# Patient Record
Sex: FEMALE | Race: WHITE | NOT HISPANIC OR LATINO
[De-identification: names, ages, dates, MRNs, and addresses within clinical notes are randomized per-mention and may not be internally consistent; named-entity substitution may affect disease eponyms.]

---

## 2017-01-27 ENCOUNTER — APPOINTMENT (OUTPATIENT)
Dept: OBGYN | Facility: CLINIC | Age: 27
End: 2017-01-27

## 2017-01-27 VITALS
HEIGHT: 62 IN | DIASTOLIC BLOOD PRESSURE: 78 MMHG | BODY MASS INDEX: 23 KG/M2 | WEIGHT: 125 LBS | SYSTOLIC BLOOD PRESSURE: 121 MMHG

## 2017-01-29 ENCOUNTER — RESULT REVIEW (OUTPATIENT)
Age: 27
End: 2017-01-29

## 2017-01-29 LAB
C TRACH DNA SPEC QL NAA+PROBE: NORMAL
C TRACH RRNA SPEC QL NAA+PROBE: NORMAL
HIV1+2 AB SPEC QL IA.RAPID: NONREACTIVE
N GONORRHOEA DNA SPEC QL NAA+PROBE: NORMAL
N GONORRHOEA RRNA SPEC QL NAA+PROBE: NORMAL
SOURCE AMPLIFICATION: NORMAL
T PALLIDUM AB SER QL IA: NEGATIVE
TSH SERPL-ACNC: 1.03 UIU/ML

## 2017-01-30 ENCOUNTER — OUTPATIENT (OUTPATIENT)
Dept: OUTPATIENT SERVICES | Facility: HOSPITAL | Age: 27
LOS: 1 days | End: 2017-01-30
Payer: COMMERCIAL

## 2017-01-30 ENCOUNTER — RESULT REVIEW (OUTPATIENT)
Age: 27
End: 2017-01-30

## 2017-01-30 DIAGNOSIS — Z30.430 ENCOUNTER FOR INSERTION OF INTRAUTERINE CONTRACEPTIVE DEVICE: ICD-10-CM

## 2017-01-30 DIAGNOSIS — Z01.818 ENCOUNTER FOR OTHER PREPROCEDURAL EXAMINATION: ICD-10-CM

## 2017-01-30 DIAGNOSIS — Z33.2 ENCOUNTER FOR ELECTIVE TERMINATION OF PREGNANCY: ICD-10-CM

## 2017-01-30 LAB
BLD GP AB SCN SERPL QL: NEGATIVE — SIGNIFICANT CHANGE UP
HCT VFR BLD CALC: 38.2 % — SIGNIFICANT CHANGE UP (ref 34.5–45)
HGB BLD-MCNC: 12.9 G/DL — SIGNIFICANT CHANGE UP (ref 11.5–15.5)
MCHC RBC-ENTMCNC: 29.3 PG — SIGNIFICANT CHANGE UP (ref 27–34)
MCHC RBC-ENTMCNC: 33.8 GM/DL — SIGNIFICANT CHANGE UP (ref 32–36)
MCV RBC AUTO: 86.6 FL — SIGNIFICANT CHANGE UP (ref 80–100)
PLATELET # BLD AUTO: 213 K/UL — SIGNIFICANT CHANGE UP (ref 150–400)
RBC # BLD: 4.41 M/UL — SIGNIFICANT CHANGE UP (ref 3.8–5.2)
RBC # FLD: 12.2 % — SIGNIFICANT CHANGE UP (ref 10.3–14.5)
RH IG SCN BLD-IMP: POSITIVE — SIGNIFICANT CHANGE UP
WBC # BLD: 5.26 K/UL — SIGNIFICANT CHANGE UP (ref 3.8–10.5)
WBC # FLD AUTO: 5.26 K/UL — SIGNIFICANT CHANGE UP (ref 3.8–10.5)

## 2017-01-30 PROCEDURE — 36415 COLL VENOUS BLD VENIPUNCTURE: CPT

## 2017-01-30 PROCEDURE — 86850 RBC ANTIBODY SCREEN: CPT

## 2017-01-30 PROCEDURE — 85027 COMPLETE CBC AUTOMATED: CPT

## 2017-01-30 PROCEDURE — 86900 BLOOD TYPING SEROLOGIC ABO: CPT

## 2017-01-30 PROCEDURE — G0463: CPT

## 2017-01-30 PROCEDURE — 86901 BLOOD TYPING SEROLOGIC RH(D): CPT

## 2017-01-31 ENCOUNTER — OUTPATIENT (OUTPATIENT)
Dept: OUTPATIENT SERVICES | Facility: HOSPITAL | Age: 27
LOS: 1 days | End: 2017-01-31
Payer: COMMERCIAL

## 2017-01-31 ENCOUNTER — APPOINTMENT (OUTPATIENT)
Dept: OBGYN | Facility: CLINIC | Age: 27
End: 2017-01-31

## 2017-01-31 DIAGNOSIS — Z32.2 ENCOUNTER FOR CHILDBIRTH INSTRUCTION: ICD-10-CM

## 2017-01-31 DIAGNOSIS — Z33.2 ENCOUNTER FOR ELECTIVE TERMINATION OF PREGNANCY: ICD-10-CM

## 2017-01-31 DIAGNOSIS — Z30.430 ENCOUNTER FOR INSERTION OF INTRAUTERINE CONTRACEPTIVE DEVICE: ICD-10-CM

## 2017-01-31 PROCEDURE — 59841 INDUCED ABORTION DILAT&EVAC: CPT

## 2017-01-31 PROCEDURE — 58300 INSERT INTRAUTERINE DEVICE: CPT

## 2017-01-31 PROCEDURE — 88305 TISSUE EXAM BY PATHOLOGIST: CPT

## 2017-01-31 PROCEDURE — 88305 TISSUE EXAM BY PATHOLOGIST: CPT | Mod: 26

## 2017-02-03 ENCOUNTER — CLINICAL ADVICE (OUTPATIENT)
Age: 27
End: 2017-02-03

## 2017-02-06 LAB — SURGICAL PATHOLOGY STUDY: SIGNIFICANT CHANGE UP

## 2017-04-12 ENCOUNTER — RESULT REVIEW (OUTPATIENT)
Age: 27
End: 2017-04-12

## 2017-05-23 ENCOUNTER — TRANSCRIPTION ENCOUNTER (OUTPATIENT)
Age: 27
End: 2017-05-23

## 2017-07-12 ENCOUNTER — APPOINTMENT (OUTPATIENT)
Dept: ANTEPARTUM | Facility: CLINIC | Age: 27
End: 2017-07-12

## 2017-07-12 ENCOUNTER — ASOB RESULT (OUTPATIENT)
Age: 27
End: 2017-07-12

## 2017-07-31 ENCOUNTER — OUTPATIENT (OUTPATIENT)
Dept: OUTPATIENT SERVICES | Facility: HOSPITAL | Age: 27
LOS: 1 days | End: 2017-07-31
Payer: COMMERCIAL

## 2017-07-31 DIAGNOSIS — O26.899 OTHER SPECIFIED PREGNANCY RELATED CONDITIONS, UNSPECIFIED TRIMESTER: ICD-10-CM

## 2017-07-31 DIAGNOSIS — Z3A.00 WEEKS OF GESTATION OF PREGNANCY NOT SPECIFIED: ICD-10-CM

## 2017-07-31 PROCEDURE — G0463: CPT

## 2017-11-18 ENCOUNTER — OUTPATIENT (OUTPATIENT)
Dept: OUTPATIENT SERVICES | Facility: HOSPITAL | Age: 27
LOS: 1 days | End: 2017-11-18
Payer: COMMERCIAL

## 2017-11-18 DIAGNOSIS — Z3A.00 WEEKS OF GESTATION OF PREGNANCY NOT SPECIFIED: ICD-10-CM

## 2017-11-18 DIAGNOSIS — O26.899 OTHER SPECIFIED PREGNANCY RELATED CONDITIONS, UNSPECIFIED TRIMESTER: ICD-10-CM

## 2017-11-18 PROCEDURE — G0463: CPT

## 2017-11-18 PROCEDURE — 59025 FETAL NON-STRESS TEST: CPT | Mod: 26

## 2017-11-30 ENCOUNTER — INPATIENT (INPATIENT)
Facility: HOSPITAL | Age: 27
LOS: 3 days | Discharge: HOME | End: 2017-12-04
Attending: OBSTETRICS & GYNECOLOGY | Admitting: OBSTETRICS & GYNECOLOGY
Payer: COMMERCIAL

## 2017-11-30 VITALS — HEIGHT: 62 IN | WEIGHT: 136.69 LBS

## 2017-11-30 DIAGNOSIS — Z3A.00 WEEKS OF GESTATION OF PREGNANCY NOT SPECIFIED: ICD-10-CM

## 2017-11-30 DIAGNOSIS — Z34.80 ENCOUNTER FOR SUPERVISION OF OTHER NORMAL PREGNANCY, UNSPECIFIED TRIMESTER: ICD-10-CM

## 2017-11-30 DIAGNOSIS — O26.899 OTHER SPECIFIED PREGNANCY RELATED CONDITIONS, UNSPECIFIED TRIMESTER: ICD-10-CM

## 2017-11-30 LAB
ALBUMIN SERPL ELPH-MCNC: 3.9 G/DL — SIGNIFICANT CHANGE UP (ref 3.3–5)
ALP SERPL-CCNC: 215 U/L — HIGH (ref 40–120)
ALT FLD-CCNC: 10 U/L RC — SIGNIFICANT CHANGE UP (ref 10–45)
ANION GAP SERPL CALC-SCNC: 18 MMOL/L — HIGH (ref 5–17)
APTT BLD: 26.6 SEC — LOW (ref 27.5–37.4)
AST SERPL-CCNC: 17 U/L — SIGNIFICANT CHANGE UP (ref 10–40)
BASOPHILS # BLD AUTO: 0 K/UL — SIGNIFICANT CHANGE UP (ref 0–0.2)
BASOPHILS # BLD AUTO: 0.1 K/UL — SIGNIFICANT CHANGE UP (ref 0–0.2)
BASOPHILS NFR BLD AUTO: 0.3 % — SIGNIFICANT CHANGE UP (ref 0–2)
BASOPHILS NFR BLD AUTO: 0.4 % — SIGNIFICANT CHANGE UP (ref 0–2)
BILIRUB SERPL-MCNC: 0.2 MG/DL — SIGNIFICANT CHANGE UP (ref 0.2–1.2)
BLD GP AB SCN SERPL QL: NEGATIVE — SIGNIFICANT CHANGE UP
BUN SERPL-MCNC: 12 MG/DL — SIGNIFICANT CHANGE UP (ref 7–23)
CALCIUM SERPL-MCNC: 9.1 MG/DL — SIGNIFICANT CHANGE UP (ref 8.4–10.5)
CHLORIDE SERPL-SCNC: 102 MMOL/L — SIGNIFICANT CHANGE UP (ref 96–108)
CO2 SERPL-SCNC: 18 MMOL/L — LOW (ref 22–31)
CREAT SERPL-MCNC: 0.62 MG/DL — SIGNIFICANT CHANGE UP (ref 0.5–1.3)
EOSINOPHIL # BLD AUTO: 0 K/UL — SIGNIFICANT CHANGE UP (ref 0–0.5)
EOSINOPHIL # BLD AUTO: 0 K/UL — SIGNIFICANT CHANGE UP (ref 0–0.5)
EOSINOPHIL NFR BLD AUTO: 0.2 % — SIGNIFICANT CHANGE UP (ref 0–6)
EOSINOPHIL NFR BLD AUTO: 0.2 % — SIGNIFICANT CHANGE UP (ref 0–6)
FIBRINOGEN PPP-MCNC: 577 MG/DL — HIGH (ref 310–510)
GLUCOSE SERPL-MCNC: 77 MG/DL — SIGNIFICANT CHANGE UP (ref 70–99)
HCT VFR BLD CALC: 40.8 % — SIGNIFICANT CHANGE UP (ref 34.5–45)
HCT VFR BLD CALC: 42.2 % — SIGNIFICANT CHANGE UP (ref 34.5–45)
HGB BLD-MCNC: 14.4 G/DL — SIGNIFICANT CHANGE UP (ref 11.5–15.5)
HGB BLD-MCNC: 15 G/DL — SIGNIFICANT CHANGE UP (ref 11.5–15.5)
INR BLD: 0.96 RATIO — SIGNIFICANT CHANGE UP (ref 0.88–1.16)
LDH SERPL L TO P-CCNC: 171 U/L — SIGNIFICANT CHANGE UP (ref 50–242)
LYMPHOCYTES # BLD AUTO: 1.4 K/UL — SIGNIFICANT CHANGE UP (ref 1–3.3)
LYMPHOCYTES # BLD AUTO: 1.4 K/UL — SIGNIFICANT CHANGE UP (ref 1–3.3)
LYMPHOCYTES # BLD AUTO: 15.3 % — SIGNIFICANT CHANGE UP (ref 13–44)
LYMPHOCYTES # BLD AUTO: 9.6 % — LOW (ref 13–44)
MCHC RBC-ENTMCNC: 30.9 PG — SIGNIFICANT CHANGE UP (ref 27–34)
MCHC RBC-ENTMCNC: 31.3 PG — SIGNIFICANT CHANGE UP (ref 27–34)
MCHC RBC-ENTMCNC: 35.2 GM/DL — SIGNIFICANT CHANGE UP (ref 32–36)
MCHC RBC-ENTMCNC: 35.5 GM/DL — SIGNIFICANT CHANGE UP (ref 32–36)
MCV RBC AUTO: 87.6 FL — SIGNIFICANT CHANGE UP (ref 80–100)
MCV RBC AUTO: 88.2 FL — SIGNIFICANT CHANGE UP (ref 80–100)
MONOCYTES # BLD AUTO: 0.6 K/UL — SIGNIFICANT CHANGE UP (ref 0–0.9)
MONOCYTES # BLD AUTO: 0.6 K/UL — SIGNIFICANT CHANGE UP (ref 0–0.9)
MONOCYTES NFR BLD AUTO: 3.9 % — SIGNIFICANT CHANGE UP (ref 2–14)
MONOCYTES NFR BLD AUTO: 6.6 % — SIGNIFICANT CHANGE UP (ref 2–14)
NEUTROPHILS # BLD AUTO: 12.8 K/UL — HIGH (ref 1.8–7.4)
NEUTROPHILS # BLD AUTO: 7.3 K/UL — SIGNIFICANT CHANGE UP (ref 1.8–7.4)
NEUTROPHILS NFR BLD AUTO: 77.6 % — HIGH (ref 43–77)
NEUTROPHILS NFR BLD AUTO: 85.9 % — HIGH (ref 43–77)
PLATELET # BLD AUTO: 163 K/UL — SIGNIFICANT CHANGE UP (ref 150–400)
PLATELET # BLD AUTO: 168 K/UL — SIGNIFICANT CHANGE UP (ref 150–400)
POTASSIUM SERPL-MCNC: 3.6 MMOL/L — SIGNIFICANT CHANGE UP (ref 3.5–5.3)
POTASSIUM SERPL-SCNC: 3.6 MMOL/L — SIGNIFICANT CHANGE UP (ref 3.5–5.3)
PROT SERPL-MCNC: 6.6 G/DL — SIGNIFICANT CHANGE UP (ref 6–8.3)
PROTHROM AB SERPL-ACNC: 10.4 SEC — SIGNIFICANT CHANGE UP (ref 9.8–12.7)
RBC # BLD: 4.66 M/UL — SIGNIFICANT CHANGE UP (ref 3.8–5.2)
RBC # BLD: 4.79 M/UL — SIGNIFICANT CHANGE UP (ref 3.8–5.2)
RBC # FLD: 11.3 % — SIGNIFICANT CHANGE UP (ref 10.3–14.5)
RBC # FLD: 11.5 % — SIGNIFICANT CHANGE UP (ref 10.3–14.5)
RH IG SCN BLD-IMP: POSITIVE — SIGNIFICANT CHANGE UP
RH IG SCN BLD-IMP: POSITIVE — SIGNIFICANT CHANGE UP
SODIUM SERPL-SCNC: 138 MMOL/L — SIGNIFICANT CHANGE UP (ref 135–145)
URATE SERPL-MCNC: 5.3 MG/DL — SIGNIFICANT CHANGE UP (ref 2.5–7)
WBC # BLD: 14.9 K/UL — HIGH (ref 3.8–10.5)
WBC # BLD: 9.4 K/UL — SIGNIFICANT CHANGE UP (ref 3.8–10.5)
WBC # FLD AUTO: 14.9 K/UL — HIGH (ref 3.8–10.5)
WBC # FLD AUTO: 9.4 K/UL — SIGNIFICANT CHANGE UP (ref 3.8–10.5)

## 2017-11-30 RX ORDER — OXYTOCIN 10 UNIT/ML
333.33 VIAL (ML) INJECTION
Qty: 20 | Refills: 0 | Status: DISCONTINUED | OUTPATIENT
Start: 2017-11-30 | End: 2017-12-01

## 2017-11-30 RX ORDER — OXYTOCIN 10 UNIT/ML
2 VIAL (ML) INJECTION
Qty: 30 | Refills: 0 | Status: DISCONTINUED | OUTPATIENT
Start: 2017-11-30 | End: 2017-12-04

## 2017-11-30 RX ORDER — CITRIC ACID/SODIUM CITRATE 300-500 MG
15 SOLUTION, ORAL ORAL EVERY 4 HOURS
Qty: 0 | Refills: 0 | Status: DISCONTINUED | OUTPATIENT
Start: 2017-11-30 | End: 2017-12-01

## 2017-11-30 RX ORDER — SODIUM CHLORIDE 9 MG/ML
1000 INJECTION, SOLUTION INTRAVENOUS
Qty: 0 | Refills: 0 | Status: DISCONTINUED | OUTPATIENT
Start: 2017-11-30 | End: 2017-12-01

## 2017-11-30 RX ORDER — SODIUM CHLORIDE 9 MG/ML
500 INJECTION, SOLUTION INTRAVENOUS ONCE
Qty: 0 | Refills: 0 | Status: DISCONTINUED | OUTPATIENT
Start: 2017-11-30 | End: 2017-12-01

## 2017-11-30 RX ADMIN — Medication 2 MILLIUNIT(S)/MIN: at 21:23

## 2017-12-01 LAB
ALBUMIN SERPL ELPH-MCNC: 2.8 G/DL — LOW (ref 3.3–5)
ALP SERPL-CCNC: 130 U/L — HIGH (ref 40–120)
ALT FLD-CCNC: 11 U/L RC — SIGNIFICANT CHANGE UP (ref 10–45)
ANION GAP SERPL CALC-SCNC: 12 MMOL/L — SIGNIFICANT CHANGE UP (ref 5–17)
APTT BLD: 28.3 SEC — SIGNIFICANT CHANGE UP (ref 27.5–37.4)
AST SERPL-CCNC: 23 U/L — SIGNIFICANT CHANGE UP (ref 10–40)
BASOPHILS # BLD AUTO: 0 K/UL — SIGNIFICANT CHANGE UP (ref 0–0.2)
BASOPHILS NFR BLD AUTO: 0.2 % — SIGNIFICANT CHANGE UP (ref 0–2)
BILIRUB SERPL-MCNC: 0.4 MG/DL — SIGNIFICANT CHANGE UP (ref 0.2–1.2)
BUN SERPL-MCNC: 8 MG/DL — SIGNIFICANT CHANGE UP (ref 7–23)
CALCIUM SERPL-MCNC: 8.5 MG/DL — SIGNIFICANT CHANGE UP (ref 8.4–10.5)
CHLORIDE SERPL-SCNC: 106 MMOL/L — SIGNIFICANT CHANGE UP (ref 96–108)
CO2 SERPL-SCNC: 21 MMOL/L — LOW (ref 22–31)
CREAT SERPL-MCNC: 0.59 MG/DL — SIGNIFICANT CHANGE UP (ref 0.5–1.3)
EOSINOPHIL # BLD AUTO: 0 K/UL — SIGNIFICANT CHANGE UP (ref 0–0.5)
EOSINOPHIL NFR BLD AUTO: 0.2 % — SIGNIFICANT CHANGE UP (ref 0–6)
FIBRINOGEN PPP-MCNC: 583 MG/DL — HIGH (ref 310–510)
GLUCOSE SERPL-MCNC: 97 MG/DL — SIGNIFICANT CHANGE UP (ref 70–99)
HCT VFR BLD CALC: 33.2 % — LOW (ref 34.5–45)
HCT VFR BLD CALC: 35.7 % — SIGNIFICANT CHANGE UP (ref 34.5–45)
HGB BLD-MCNC: 11.8 G/DL — SIGNIFICANT CHANGE UP (ref 11.5–15.5)
HGB BLD-MCNC: 12.8 G/DL — SIGNIFICANT CHANGE UP (ref 11.5–15.5)
INR BLD: 0.96 RATIO — SIGNIFICANT CHANGE UP (ref 0.88–1.16)
LDH SERPL L TO P-CCNC: 260 U/L — HIGH (ref 50–242)
LYMPHOCYTES # BLD AUTO: 1.3 K/UL — SIGNIFICANT CHANGE UP (ref 1–3.3)
LYMPHOCYTES # BLD AUTO: 11.8 % — LOW (ref 13–44)
MCHC RBC-ENTMCNC: 31.9 PG — SIGNIFICANT CHANGE UP (ref 27–34)
MCHC RBC-ENTMCNC: 35.6 GM/DL — SIGNIFICANT CHANGE UP (ref 32–36)
MCV RBC AUTO: 89.5 FL — SIGNIFICANT CHANGE UP (ref 80–100)
MONOCYTES # BLD AUTO: 0.5 K/UL — SIGNIFICANT CHANGE UP (ref 0–0.9)
MONOCYTES NFR BLD AUTO: 5 % — SIGNIFICANT CHANGE UP (ref 2–14)
NEUTROPHILS # BLD AUTO: 8.9 K/UL — HIGH (ref 1.8–7.4)
NEUTROPHILS NFR BLD AUTO: 82.8 % — HIGH (ref 43–77)
PLATELET # BLD AUTO: 117 K/UL — LOW (ref 150–400)
POTASSIUM SERPL-MCNC: 3.9 MMOL/L — SIGNIFICANT CHANGE UP (ref 3.5–5.3)
POTASSIUM SERPL-SCNC: 3.9 MMOL/L — SIGNIFICANT CHANGE UP (ref 3.5–5.3)
PROT SERPL-MCNC: 4.9 G/DL — LOW (ref 6–8.3)
PROTHROM AB SERPL-ACNC: 10.4 SEC — SIGNIFICANT CHANGE UP (ref 9.8–12.7)
RBC # BLD: 3.71 M/UL — LOW (ref 3.8–5.2)
RBC # FLD: 11.4 % — SIGNIFICANT CHANGE UP (ref 10.3–14.5)
SODIUM SERPL-SCNC: 139 MMOL/L — SIGNIFICANT CHANGE UP (ref 135–145)
T PALLIDUM AB TITR SER: NEGATIVE — SIGNIFICANT CHANGE UP
URATE SERPL-MCNC: 5.2 MG/DL — SIGNIFICANT CHANGE UP (ref 2.5–7)
WBC # BLD: 10.7 K/UL — HIGH (ref 3.8–10.5)
WBC # FLD AUTO: 10.7 K/UL — HIGH (ref 3.8–10.5)

## 2017-12-01 RX ORDER — KETOROLAC TROMETHAMINE 30 MG/ML
30 SYRINGE (ML) INJECTION ONCE
Qty: 0 | Refills: 0 | Status: DISCONTINUED | OUTPATIENT
Start: 2017-12-01 | End: 2017-12-01

## 2017-12-01 RX ORDER — OXYCODONE HYDROCHLORIDE 5 MG/1
5 TABLET ORAL EVERY 4 HOURS
Qty: 0 | Refills: 0 | Status: DISCONTINUED | OUTPATIENT
Start: 2017-12-01 | End: 2017-12-04

## 2017-12-01 RX ORDER — IBUPROFEN 200 MG
600 TABLET ORAL EVERY 6 HOURS
Qty: 0 | Refills: 0 | Status: COMPLETED | OUTPATIENT
Start: 2017-12-01 | End: 2018-10-30

## 2017-12-01 RX ORDER — HYDROCORTISONE 1 %
1 OINTMENT (GRAM) TOPICAL EVERY 4 HOURS
Qty: 0 | Refills: 0 | Status: DISCONTINUED | OUTPATIENT
Start: 2017-12-01 | End: 2017-12-04

## 2017-12-01 RX ORDER — OXYTOCIN 10 UNIT/ML
41.67 VIAL (ML) INJECTION
Qty: 20 | Refills: 0 | Status: DISCONTINUED | OUTPATIENT
Start: 2017-12-01 | End: 2017-12-01

## 2017-12-01 RX ORDER — PRAMOXINE HYDROCHLORIDE 150 MG/15G
1 AEROSOL, FOAM RECTAL EVERY 4 HOURS
Qty: 0 | Refills: 0 | Status: DISCONTINUED | OUTPATIENT
Start: 2017-12-01 | End: 2017-12-01

## 2017-12-01 RX ORDER — DIBUCAINE 1 %
1 OINTMENT (GRAM) RECTAL EVERY 4 HOURS
Qty: 0 | Refills: 0 | Status: DISCONTINUED | OUTPATIENT
Start: 2017-12-01 | End: 2017-12-04

## 2017-12-01 RX ORDER — LANOLIN
1 OINTMENT (GRAM) TOPICAL EVERY 6 HOURS
Qty: 0 | Refills: 0 | Status: DISCONTINUED | OUTPATIENT
Start: 2017-12-01 | End: 2017-12-04

## 2017-12-01 RX ORDER — DIBUCAINE 1 %
1 OINTMENT (GRAM) RECTAL EVERY 4 HOURS
Qty: 0 | Refills: 0 | Status: DISCONTINUED | OUTPATIENT
Start: 2017-12-01 | End: 2017-12-01

## 2017-12-01 RX ORDER — BENZOCAINE 10 %
1 GEL (GRAM) MUCOUS MEMBRANE EVERY 6 HOURS
Qty: 0 | Refills: 0 | Status: DISCONTINUED | OUTPATIENT
Start: 2017-12-01 | End: 2017-12-04

## 2017-12-01 RX ORDER — HYDROCORTISONE 1 %
1 OINTMENT (GRAM) TOPICAL EVERY 4 HOURS
Qty: 0 | Refills: 0 | Status: DISCONTINUED | OUTPATIENT
Start: 2017-12-01 | End: 2017-12-01

## 2017-12-01 RX ORDER — SIMETHICONE 80 MG/1
80 TABLET, CHEWABLE ORAL EVERY 6 HOURS
Qty: 0 | Refills: 0 | Status: DISCONTINUED | OUTPATIENT
Start: 2017-12-01 | End: 2017-12-04

## 2017-12-01 RX ORDER — PRAMOXINE HYDROCHLORIDE 150 MG/15G
1 AEROSOL, FOAM RECTAL EVERY 4 HOURS
Qty: 0 | Refills: 0 | Status: DISCONTINUED | OUTPATIENT
Start: 2017-12-01 | End: 2017-12-04

## 2017-12-01 RX ORDER — TETANUS TOXOID, REDUCED DIPHTHERIA TOXOID AND ACELLULAR PERTUSSIS VACCINE, ADSORBED 5; 2.5; 8; 8; 2.5 [IU]/.5ML; [IU]/.5ML; UG/.5ML; UG/.5ML; UG/.5ML
0.5 SUSPENSION INTRAMUSCULAR ONCE
Qty: 0 | Refills: 0 | Status: DISCONTINUED | OUTPATIENT
Start: 2017-12-01 | End: 2017-12-04

## 2017-12-01 RX ORDER — OXYCODONE HYDROCHLORIDE 5 MG/1
5 TABLET ORAL
Qty: 0 | Refills: 0 | Status: DISCONTINUED | OUTPATIENT
Start: 2017-12-01 | End: 2017-12-04

## 2017-12-01 RX ORDER — OXYTOCIN 10 UNIT/ML
41.67 VIAL (ML) INJECTION
Qty: 20 | Refills: 0 | Status: DISCONTINUED | OUTPATIENT
Start: 2017-12-01 | End: 2017-12-04

## 2017-12-01 RX ORDER — IBUPROFEN 200 MG
600 TABLET ORAL EVERY 6 HOURS
Qty: 0 | Refills: 0 | Status: DISCONTINUED | OUTPATIENT
Start: 2017-12-01 | End: 2017-12-04

## 2017-12-01 RX ORDER — MAGNESIUM HYDROXIDE 400 MG/1
30 TABLET, CHEWABLE ORAL
Qty: 0 | Refills: 0 | Status: DISCONTINUED | OUTPATIENT
Start: 2017-12-01 | End: 2017-12-04

## 2017-12-01 RX ORDER — AER TRAVELER 0.5 G/1
1 SOLUTION RECTAL; TOPICAL EVERY 4 HOURS
Qty: 0 | Refills: 0 | Status: DISCONTINUED | OUTPATIENT
Start: 2017-12-01 | End: 2017-12-01

## 2017-12-01 RX ORDER — ACETAMINOPHEN 500 MG
975 TABLET ORAL EVERY 6 HOURS
Qty: 0 | Refills: 0 | Status: DISCONTINUED | OUTPATIENT
Start: 2017-12-01 | End: 2017-12-04

## 2017-12-01 RX ORDER — SODIUM CHLORIDE 9 MG/ML
1000 INJECTION, SOLUTION INTRAVENOUS
Qty: 0 | Refills: 0 | Status: DISCONTINUED | OUTPATIENT
Start: 2017-12-01 | End: 2017-12-04

## 2017-12-01 RX ORDER — ACETAMINOPHEN 500 MG
975 TABLET ORAL EVERY 6 HOURS
Qty: 0 | Refills: 0 | Status: COMPLETED | OUTPATIENT
Start: 2017-12-01 | End: 2018-10-30

## 2017-12-01 RX ORDER — DOCUSATE SODIUM 100 MG
100 CAPSULE ORAL
Qty: 0 | Refills: 0 | Status: DISCONTINUED | OUTPATIENT
Start: 2017-12-01 | End: 2017-12-04

## 2017-12-01 RX ORDER — SODIUM CHLORIDE 9 MG/ML
3 INJECTION INTRAMUSCULAR; INTRAVENOUS; SUBCUTANEOUS EVERY 8 HOURS
Qty: 0 | Refills: 0 | Status: DISCONTINUED | OUTPATIENT
Start: 2017-12-01 | End: 2017-12-01

## 2017-12-01 RX ORDER — DIPHENHYDRAMINE HCL 50 MG
25 CAPSULE ORAL EVERY 6 HOURS
Qty: 0 | Refills: 0 | Status: DISCONTINUED | OUTPATIENT
Start: 2017-12-01 | End: 2017-12-04

## 2017-12-01 RX ORDER — SODIUM CHLORIDE 9 MG/ML
3 INJECTION INTRAMUSCULAR; INTRAVENOUS; SUBCUTANEOUS EVERY 8 HOURS
Qty: 0 | Refills: 0 | Status: DISCONTINUED | OUTPATIENT
Start: 2017-12-01 | End: 2017-12-04

## 2017-12-01 RX ORDER — GLYCERIN ADULT
1 SUPPOSITORY, RECTAL RECTAL AT BEDTIME
Qty: 0 | Refills: 0 | Status: DISCONTINUED | OUTPATIENT
Start: 2017-12-01 | End: 2017-12-04

## 2017-12-01 RX ORDER — AER TRAVELER 0.5 G/1
1 SOLUTION RECTAL; TOPICAL EVERY 4 HOURS
Qty: 0 | Refills: 0 | Status: DISCONTINUED | OUTPATIENT
Start: 2017-12-01 | End: 2017-12-04

## 2017-12-01 RX ADMIN — Medication 100 MILLIGRAM(S): at 18:06

## 2017-12-01 RX ADMIN — SODIUM CHLORIDE 125 MILLILITER(S): 9 INJECTION, SOLUTION INTRAVENOUS at 03:29

## 2017-12-01 RX ADMIN — Medication 30 MILLIGRAM(S): at 11:16

## 2017-12-01 RX ADMIN — Medication 125 MILLIUNIT(S)/MIN: at 04:17

## 2017-12-01 RX ADMIN — Medication 600 MILLIGRAM(S): at 16:56

## 2017-12-01 RX ADMIN — Medication 975 MILLIGRAM(S): at 18:36

## 2017-12-01 RX ADMIN — Medication 975 MILLIGRAM(S): at 18:06

## 2017-12-01 RX ADMIN — Medication 30 MILLIGRAM(S): at 11:11

## 2017-12-01 RX ADMIN — Medication 600 MILLIGRAM(S): at 16:26

## 2017-12-01 RX ADMIN — Medication 30 MILLIGRAM(S): at 04:17

## 2017-12-01 NOTE — CHART NOTE - NSCHARTNOTEFT_GEN_A_CORE
28yo  PPD#0 from [facility] for NRFHT, called by RN for elevated BPs (135/85->150/96->163/108->137/93). Patient seen and examined and bedside. Patient sitting in bed in NAD surrounded by multiple family members. Patient denies any HA, dizziness, lightheadedness, blurry vision, chest pain, shortness of breath, nausea or vomiting. Patient has been ambulating without difficulty and voiding spontaneously. She states pain is well controlled at this time. Patient has history of anxiety on lexapro in the past. States she is an anxious person and has not slept today so is exhausted but otherwise is doing well overall.     VS  T(C): 36.7 (17 @ 14:25)  HR: 60 (17 @ 14:25)  BP: 137/93 (17 @ 19:01)  RR: 18 (17 @ 14:25)  SpO2: 100% (17 @ 14:25)    Physical Exam:  General: NAD  CV: NR, RR, S1, S2, no M/R/G  Lungs: CTA-B  Abdomen: Soft, non-tender, non-distended, fundus firm  Ext: No pain or swelling    28yo  [facility], called by RN for elevated BPs (135/85->150/96->163/108->137/93).  - In setting of elevated BP postpartum, have ordered for HELLP labs to be drawn STAT. Patient asymptomatic at this time, denies any HA, blurry vision or RUQ pain. Will continue to monitor and follow HELLP labs.   - History of anxiety and alcoholism: Social work consult ordered.     D/w Dr. Amber Stacy, PGY-1

## 2017-12-02 ENCOUNTER — TRANSCRIPTION ENCOUNTER (OUTPATIENT)
Age: 27
End: 2017-12-02

## 2017-12-02 LAB
CREAT ?TM UR-MCNC: 21 MG/DL — SIGNIFICANT CHANGE UP
HCT VFR BLD CALC: 32.2 % — LOW (ref 34.5–45)
HGB BLD-MCNC: 11.2 G/DL — LOW (ref 11.5–15.5)
PROT ?TM UR-MCNC: 16 MG/DL — HIGH (ref 0–12)
PROT/CREAT UR-RTO: 0.8 RATIO — HIGH (ref 0–0.2)

## 2017-12-02 RX ORDER — ESCITALOPRAM OXALATE 10 MG/1
20 TABLET, FILM COATED ORAL DAILY
Qty: 0 | Refills: 0 | Status: DISCONTINUED | OUTPATIENT
Start: 2017-12-02 | End: 2017-12-04

## 2017-12-02 RX ORDER — ALPRAZOLAM 0.25 MG
0.5 TABLET ORAL EVERY 8 HOURS
Qty: 0 | Refills: 0 | Status: DISCONTINUED | OUTPATIENT
Start: 2017-12-02 | End: 2017-12-04

## 2017-12-02 RX ADMIN — Medication 600 MILLIGRAM(S): at 12:07

## 2017-12-02 RX ADMIN — Medication 100 MILLIGRAM(S): at 17:55

## 2017-12-02 RX ADMIN — Medication 975 MILLIGRAM(S): at 12:11

## 2017-12-02 RX ADMIN — Medication 600 MILLIGRAM(S): at 06:00

## 2017-12-02 RX ADMIN — Medication 975 MILLIGRAM(S): at 00:25

## 2017-12-02 RX ADMIN — SODIUM CHLORIDE 3 MILLILITER(S): 9 INJECTION INTRAMUSCULAR; INTRAVENOUS; SUBCUTANEOUS at 17:53

## 2017-12-02 RX ADMIN — Medication 975 MILLIGRAM(S): at 01:00

## 2017-12-02 RX ADMIN — Medication 975 MILLIGRAM(S): at 12:07

## 2017-12-02 RX ADMIN — Medication 600 MILLIGRAM(S): at 05:24

## 2017-12-02 RX ADMIN — Medication 975 MILLIGRAM(S): at 17:57

## 2017-12-02 RX ADMIN — Medication 600 MILLIGRAM(S): at 12:10

## 2017-12-02 RX ADMIN — Medication 600 MILLIGRAM(S): at 17:55

## 2017-12-02 RX ADMIN — Medication 975 MILLIGRAM(S): at 05:25

## 2017-12-02 NOTE — DISCHARGE NOTE OB - MEDICATION SUMMARY - MEDICATIONS TO TAKE
I will START or STAY ON the medications listed below when I get home from the hospital:    acetaminophen 325 mg oral tablet  -- 3 tab(s) by mouth every 6 hours  -- Indication: For mild to moderate pain    ibuprofen 600 mg oral tablet  -- 1 tab(s) by mouth every 6 hours  -- Indication: For mild to moderate pain    escitalopram 20 mg oral tablet  -- 1 tab(s) by mouth once a day  -- Indication: For anxiety    dibucaine 1% topical ointment  -- 1 application on skin every 4 hours, As needed, Perineal Discomfort  -- Indication: For perineal discomfort    Prenatal Multivitamins with Folic Acid 1 mg oral tablet  -- 1 tab(s) by mouth once a day  -- Indication: For Supplement    Paola-Sequels  -- 1 tab(s) by mouth once a day  -- Indication: For anemia I will START or STAY ON the medications listed below when I get home from the hospital:    acetaminophen 325 mg oral tablet  -- 3 tab(s) by mouth every 6 hours  -- Indication: For mild to moderate pain    ibuprofen 600 mg oral tablet  -- 1 tab(s) by mouth every 6 hours  -- Indication: For mild to moderate pain    escitalopram 20 mg oral tablet  -- 1 tab(s) by mouth once a day  -- Indication: For anxiety    labetalol 200 mg oral tablet  -- 1 tab(s) by mouth every 8 hours  -- Indication: For BP control    dibucaine 1% topical ointment  -- 1 application on skin every 4 hours, As needed, Perineal Discomfort  -- Indication: For perineal discomfort    Prenatal Multivitamins with Folic Acid 1 mg oral tablet  -- 1 tab(s) by mouth once a day  -- Indication: For Supplement    Paola-Sequels  -- 1 tab(s) by mouth once a day  -- Indication: For anemia

## 2017-12-02 NOTE — DISCHARGE NOTE OB - PLAN OF CARE
return to normal activities over the next 5-6 weeks regular diet;  limited physical and sexual activities for 5-6 weeks;  to office in 5-6 weeks regular diet;  limited physical and sexual activities for 5-6 weeks;  to office in 2 weeks.  GET BP CUFF. TAKE BPS 3X/DAY. IF BP < 100/70 HOLD THAT DOSE OF LABETALOL. IF BPS > 150S/90S CALL MD

## 2017-12-02 NOTE — DISCHARGE NOTE OB - INSTRUCTIONS
doctor for any BP over 150/90, headaches, dizziness, blurred vision, upper abdominal pain, nausea or vomiting, increased vaginal bleeding, fever,

## 2017-12-02 NOTE — PROGRESS NOTE ADULT - PROBLEM SELECTOR PLAN 1
Increase OOB  Regular diet  PO Pain protocol  AM H&H  Routine Postpartum Care  Will Discuss with Attending regarding restarting patient on lexapro for anxiety  Elevated BP possible related to anxiety, will continue to monitor- no evidence of PEC at this time.    Renetta Pepe PA-C

## 2017-12-02 NOTE — DISCHARGE NOTE OB - CARE PLAN
Principal Discharge DX:	Vaginal delivery  Goal:	return to normal activities over the next 5-6 weeks  Instructions for follow-up, activity and diet:	regular diet;  limited physical and sexual activities for 5-6 weeks;  to office in 5-6 weeks Principal Discharge DX:	Vaginal delivery  Goal:	return to normal activities over the next 5-6 weeks  Instructions for follow-up, activity and diet:	regular diet;  limited physical and sexual activities for 5-6 weeks;  to office in 2 weeks.  GET BP CUFF. TAKE BPS 3X/DAY. IF BP < 100/70 HOLD THAT DOSE OF LABETALOL. IF BPS > 150S/90S CALL MD

## 2017-12-02 NOTE — DISCHARGE NOTE OB - HOSPITAL COURSE
The patient had an uneventful labor and delivery.  she did well post partum and was started back on her lexapro due to anxiety. The patient had an uneventful labor and delivery. SHE WAS TO BE INDUCED FOR POST DATES BUT WENT INTO LABOR. SHE HAD VAVD 2 TO NRFHRT.  she did well post partum and was started back on her lexapro due to anxiety.  SHE WAS TO BE D/C'D PPD#2 BUT DEVELOPED GHTN. SHE WAS STARTED ON MGSO4 FOR 24 HRS & LABETALOL 200 MG TID. D/C'D ON LABETALOL. PT WILL RESTART HER LEXAPRO  & CALL HER PSYCH MD

## 2017-12-02 NOTE — DISCHARGE NOTE OB - CARE PROVIDER_API CALL
Jamie Charles), Obstetrics and Gynecology  [address] [place] [address]  Phone: [phone]  Fax: [phone]

## 2017-12-02 NOTE — DISCHARGE NOTE OB - PATIENT PORTAL LINK FT
“You can access the FollowHealth Patient Portal, offered by [facility], by registering with the following website: [email or web address]”

## 2017-12-02 NOTE — PROGRESS NOTE ADULT - ATTENDING COMMENTS
patient is feeling anxious and has a history of an anxiety disorder in the past  bp are borderline  abdomen soft, fundus firm, lochia mod  voiding well  labs stable    will restart lexapro at patient request and will give prn xanax as needed

## 2017-12-02 NOTE — PROGRESS NOTE ADULT - SUBJECTIVE AND OBJECTIVE BOX
Postpartum Note- PPD#1    Allergies    No Known Allergies      Blood Type A   Positive    RPR : Negative    Rubella: Immune    S: Patient is a  27y      P 1011     PPD#1         S/P   VAVD  Patient w/o complaints, pain is controlled.    Pt is OOB, tolerating PO, passing flatus. Lochia WNL.   Deniea HUITRON, visual changes or epigastric pain  Pt c/o feeling anxious and would like to restart lexapro which she was on prior to pregnancy  Feeding: Breast    O:  Vital Signs Last 24 Hrs  T(C): 36.6 (02 Dec 2017 05:00), Max: 37.1 (01 Dec 2017 08:40)  T(F): 97.9 (02 Dec 2017 05:00), Max: 98.8 (01 Dec 2017 08:40)  HR: 60 (01 Dec 2017 14:25) (60 - 101)  BP: 140/72 (02 Dec 2017 05:00) (130/82 - 150/96)  BP(mean): --  RR: 18 (02 Dec 2017 05:00) (18 - 18)  SpO2: 100% (01 Dec 2017 14:25) (100% - 100%)     Gen: NAD  Abdomen: Soft, nontender, non-distended, fundus firm.  Vaginal: Lochia WNL  Ext: Neg edema, Neg calf tenderness    LABS:    Hemoglobin: 11.8 g/dL (12-01 @ 20:24)  Hemoglobin: 12.8 g/dL (12-01 @ 06:20)  Hemoglobin: 15.0 g/dL (11-30 @ 19:55)  Hemoglobin: 14.4 g/dL (11-30 @ 15:55)      Hematocrit: 33.2 % (12-01 @ 20:24)  Hematocrit: 35.7 % (12-01 @ 06:20)  Hematocrit: 42.2 % (11-30 @ 19:55)  Hematocrit: 40.8 % (11-30 @ 15:55)        PMHx: none  Current Issues: Elevated BP , HELLP labs WNL, pt with h/o anxiety and feeling anxious, would like to restart lexapro

## 2017-12-03 LAB
ALBUMIN SERPL ELPH-MCNC: 3.5 G/DL — SIGNIFICANT CHANGE UP (ref 3.3–5)
ALP SERPL-CCNC: 133 U/L — HIGH (ref 40–120)
ALT FLD-CCNC: 12 U/L RC — SIGNIFICANT CHANGE UP (ref 10–45)
ANION GAP SERPL CALC-SCNC: 13 MMOL/L — SIGNIFICANT CHANGE UP (ref 5–17)
APTT BLD: 29.1 SEC — SIGNIFICANT CHANGE UP (ref 27.5–37.4)
AST SERPL-CCNC: 17 U/L — SIGNIFICANT CHANGE UP (ref 10–40)
BASOPHILS # BLD AUTO: 0 K/UL — SIGNIFICANT CHANGE UP (ref 0–0.2)
BASOPHILS NFR BLD AUTO: 0.1 % — SIGNIFICANT CHANGE UP (ref 0–2)
BILIRUB SERPL-MCNC: 0.2 MG/DL — SIGNIFICANT CHANGE UP (ref 0.2–1.2)
BUN SERPL-MCNC: 6 MG/DL — LOW (ref 7–23)
CALCIUM SERPL-MCNC: 9.1 MG/DL — SIGNIFICANT CHANGE UP (ref 8.4–10.5)
CHLORIDE SERPL-SCNC: 107 MMOL/L — SIGNIFICANT CHANGE UP (ref 96–108)
CO2 SERPL-SCNC: 25 MMOL/L — SIGNIFICANT CHANGE UP (ref 22–31)
CREAT SERPL-MCNC: 0.6 MG/DL — SIGNIFICANT CHANGE UP (ref 0.5–1.3)
EOSINOPHIL # BLD AUTO: 0.1 K/UL — SIGNIFICANT CHANGE UP (ref 0–0.5)
EOSINOPHIL NFR BLD AUTO: 1.1 % — SIGNIFICANT CHANGE UP (ref 0–6)
FIBRINOGEN PPP-MCNC: 774 MG/DL — HIGH (ref 310–510)
GLUCOSE SERPL-MCNC: 87 MG/DL — SIGNIFICANT CHANGE UP (ref 70–99)
HCT VFR BLD CALC: 35.1 % — SIGNIFICANT CHANGE UP (ref 34.5–45)
HGB BLD-MCNC: 12.5 G/DL — SIGNIFICANT CHANGE UP (ref 11.5–15.5)
INR BLD: 0.92 RATIO — SIGNIFICANT CHANGE UP (ref 0.88–1.16)
LDH SERPL L TO P-CCNC: 241 U/L — SIGNIFICANT CHANGE UP (ref 50–242)
LYMPHOCYTES # BLD AUTO: 1 K/UL — SIGNIFICANT CHANGE UP (ref 1–3.3)
LYMPHOCYTES # BLD AUTO: 11 % — LOW (ref 13–44)
MAGNESIUM SERPL-MCNC: 4.7 MG/DL — HIGH (ref 1.6–2.6)
MCHC RBC-ENTMCNC: 31.7 PG — SIGNIFICANT CHANGE UP (ref 27–34)
MCHC RBC-ENTMCNC: 35.5 GM/DL — SIGNIFICANT CHANGE UP (ref 32–36)
MCV RBC AUTO: 89.2 FL — SIGNIFICANT CHANGE UP (ref 80–100)
MONOCYTES # BLD AUTO: 0.4 K/UL — SIGNIFICANT CHANGE UP (ref 0–0.9)
MONOCYTES NFR BLD AUTO: 4 % — SIGNIFICANT CHANGE UP (ref 2–14)
NEUTROPHILS # BLD AUTO: 7.5 K/UL — HIGH (ref 1.8–7.4)
NEUTROPHILS NFR BLD AUTO: 83.8 % — HIGH (ref 43–77)
PLATELET # BLD AUTO: 149 K/UL — LOW (ref 150–400)
POTASSIUM SERPL-MCNC: 4.1 MMOL/L — SIGNIFICANT CHANGE UP (ref 3.5–5.3)
POTASSIUM SERPL-SCNC: 4.1 MMOL/L — SIGNIFICANT CHANGE UP (ref 3.5–5.3)
PROT SERPL-MCNC: 6 G/DL — SIGNIFICANT CHANGE UP (ref 6–8.3)
PROTHROM AB SERPL-ACNC: 9.9 SEC — SIGNIFICANT CHANGE UP (ref 9.8–12.7)
RBC # BLD: 3.94 M/UL — SIGNIFICANT CHANGE UP (ref 3.8–5.2)
RBC # FLD: 11.4 % — SIGNIFICANT CHANGE UP (ref 10.3–14.5)
SODIUM SERPL-SCNC: 145 MMOL/L — SIGNIFICANT CHANGE UP (ref 135–145)
URATE SERPL-MCNC: 4.4 MG/DL — SIGNIFICANT CHANGE UP (ref 2.5–7)
WBC # BLD: 8.9 K/UL — SIGNIFICANT CHANGE UP (ref 3.8–10.5)
WBC # FLD AUTO: 8.9 K/UL — SIGNIFICANT CHANGE UP (ref 3.8–10.5)

## 2017-12-03 RX ORDER — LABETALOL HCL 100 MG
200 TABLET ORAL EVERY 8 HOURS
Qty: 0 | Refills: 0 | Status: DISCONTINUED | OUTPATIENT
Start: 2017-12-03 | End: 2017-12-04

## 2017-12-03 RX ORDER — MAGNESIUM SULFATE 500 MG/ML
4 VIAL (ML) INJECTION ONCE
Qty: 0 | Refills: 0 | Status: COMPLETED | OUTPATIENT
Start: 2017-12-03 | End: 2017-12-03

## 2017-12-03 RX ORDER — DIBUCAINE 1 %
1 OINTMENT (GRAM) RECTAL
Qty: 0 | Refills: 0 | COMMUNITY
Start: 2017-12-03

## 2017-12-03 RX ORDER — ACETAMINOPHEN 500 MG
3 TABLET ORAL
Qty: 0 | Refills: 0 | COMMUNITY
Start: 2017-12-03

## 2017-12-03 RX ORDER — LABETALOL HCL 100 MG
20 TABLET ORAL ONCE
Qty: 0 | Refills: 0 | Status: DISCONTINUED | OUTPATIENT
Start: 2017-12-03 | End: 2017-12-04

## 2017-12-03 RX ORDER — NIFEDIPINE 30 MG
10 TABLET, EXTENDED RELEASE 24 HR ORAL ONCE
Qty: 0 | Refills: 0 | Status: DISCONTINUED | OUTPATIENT
Start: 2017-12-03 | End: 2017-12-03

## 2017-12-03 RX ORDER — ESCITALOPRAM OXALATE 10 MG/1
1 TABLET, FILM COATED ORAL
Qty: 0 | Refills: 0 | COMMUNITY
Start: 2017-12-03

## 2017-12-03 RX ORDER — MAGNESIUM SULFATE 500 MG/ML
2 VIAL (ML) INJECTION
Qty: 40 | Refills: 0 | Status: DISCONTINUED | OUTPATIENT
Start: 2017-12-03 | End: 2017-12-04

## 2017-12-03 RX ORDER — IBUPROFEN 200 MG
1 TABLET ORAL
Qty: 0 | Refills: 0 | DISCHARGE
Start: 2017-12-03

## 2017-12-03 RX ORDER — NIFEDIPINE 30 MG
10 TABLET, EXTENDED RELEASE 24 HR ORAL ONCE
Qty: 0 | Refills: 0 | Status: COMPLETED | OUTPATIENT
Start: 2017-12-03 | End: 2017-12-03

## 2017-12-03 RX ADMIN — Medication 200 MILLIGRAM(S): at 14:07

## 2017-12-03 RX ADMIN — Medication 600 MILLIGRAM(S): at 23:30

## 2017-12-03 RX ADMIN — Medication 600 MILLIGRAM(S): at 06:53

## 2017-12-03 RX ADMIN — SODIUM CHLORIDE 3 MILLILITER(S): 9 INJECTION INTRAMUSCULAR; INTRAVENOUS; SUBCUTANEOUS at 16:54

## 2017-12-03 RX ADMIN — Medication 100 MILLIGRAM(S): at 16:53

## 2017-12-03 RX ADMIN — Medication 0.5 MILLIGRAM(S): at 19:42

## 2017-12-03 RX ADMIN — Medication 975 MILLIGRAM(S): at 23:30

## 2017-12-03 RX ADMIN — Medication 975 MILLIGRAM(S): at 17:31

## 2017-12-03 RX ADMIN — Medication 975 MILLIGRAM(S): at 06:52

## 2017-12-03 RX ADMIN — Medication 600 MILLIGRAM(S): at 23:09

## 2017-12-03 RX ADMIN — Medication 300 GRAM(S): at 13:40

## 2017-12-03 RX ADMIN — Medication 10 MILLIGRAM(S): at 13:31

## 2017-12-03 RX ADMIN — Medication 975 MILLIGRAM(S): at 06:17

## 2017-12-03 RX ADMIN — Medication 0.5 MILLIGRAM(S): at 12:26

## 2017-12-03 RX ADMIN — Medication 975 MILLIGRAM(S): at 23:09

## 2017-12-03 RX ADMIN — Medication 50 GM/HR: at 14:05

## 2017-12-03 RX ADMIN — SODIUM CHLORIDE 3 MILLILITER(S): 9 INJECTION INTRAMUSCULAR; INTRAVENOUS; SUBCUTANEOUS at 19:48

## 2017-12-03 RX ADMIN — Medication 200 MILLIGRAM(S): at 21:53

## 2017-12-03 RX ADMIN — Medication 975 MILLIGRAM(S): at 16:54

## 2017-12-03 RX ADMIN — Medication 600 MILLIGRAM(S): at 06:16

## 2017-12-03 NOTE — CHART NOTE - NSCHARTNOTEFT_GEN_A_CORE
S: Patient examined at bedside. Called by nursing to evaluate new dark spots in vision. As patient was leaving for discharge, she stood up and saw dark spots in her eyes bilaterally. She became dizzy and faint but did not lose consciousness. Per family who witnessed the event, patient additionally became pale. Patient was returned to supine position and symptoms resolved. VS were then taken and /98 and HR 60. Repeat /94. Per patient, she has minimally ambulated the past 2 days, only to the bathroom and back. Patient states pain is well controlled but she "didn't feel like walking" over the past two days. Vaginal bleeding has been light and patient denies clots or brisk bleeding. Patient reports severe anxiety. Patient refused lexapro while in the hospital because she wanted to speak to her psychiatrist before restarting it. Patient also has not taken any of the Xanax prescribed to her during her stay.     O: VS repeat 160/90, HR 60  Gen- anxious  CV- RRR, no murmurs  Resp: CTA  Abd: soft, fundus firm  : lochia wnl, pads reviewed and minimal vaginal bleeding seen    A/P: 26 yo  PPD#2 VAVD who had a vasovagal syncopal episode upon walking. BP in severe range. HELLP labs  WNL. Patient extremely anxious and requesting relief  - will give Xanax  - will wait on repeat HELLP labs  - BP monitoring q15 minutes     d/w Dr. Melvin Tran pgy1

## 2017-12-03 NOTE — PROVIDER CONTACT NOTE (OTHER) - SITUATION
Patient reports feeling lightheaded and seeing black spots while getting up from bed.  Blood pressure is 178/98. HR is 60 bpm.

## 2017-12-03 NOTE — PROGRESS NOTE ADULT - SUBJECTIVE AND OBJECTIVE BOX
PPD#2- ATTENDING NOTE    S: Patient doing well. Minimal lochia. Pain controlled.    O: Vital Signs Last 24 Hrs  T(C): 36.7 (03 Dec 2017 06:16), Max: 36.8 (02 Dec 2017 17:00)  T(F): 98 (03 Dec 2017 06:16), Max: 98.2 (02 Dec 2017 17:00)  HR: 77 (02 Dec 2017 21:00) (60 - 77)  BP: 130/77 (02 Dec 2017 21:00) (128/78 - 139/82)  BP(mean): --  RR: 18 (02 Dec 2017 21:00) (18 - 18)  SpO2: 97% (02 Dec 2017 13:00) (97% - 99%)    Gen: NAD  Abd: soft, NT, ND, fundus firm below umbilicus  Lochia: moderate  Ext: no tenderness, no hyper reflexia  Voiding well    Labs:    ABO Interpretation: A (17 @ 17:29)  Rh Interpretation: Positive (17 @ 17:29)  ABO Interpretation: A (17 @ 17:11)  Rh Interpretation: Positive (17 @ 17:11)   Antibody ScreenNegative                        11.2   x     )-----------( x        ( 02 Dec 2017 08:33 )             32.2       A: 27y PPD# s/p  doing well.    Plan:  Analgesia prn  Regular diet  Discharge instruction given  F/U 6 weeks      Office [phone]  Dr. Charles

## 2017-12-03 NOTE — PROVIDER CONTACT NOTE (OTHER) - RECOMMENDATIONS
Dr. Tran notified of blood pressure and says to recheck blood pressure. Repeat is 166/94. Dr. Tran notified again and says to repeat in 5 minutes. Repeat after that is 160/90.

## 2017-12-03 NOTE — PROVIDER CONTACT NOTE (OTHER) - ACTION/TREATMENT ORDERED:
Dr. Tran says to give Xanax now as per Dr. Charles and transfer patient to OB PACU for close observation.

## 2017-12-04 VITALS — DIASTOLIC BLOOD PRESSURE: 70 MMHG | SYSTOLIC BLOOD PRESSURE: 120 MMHG

## 2017-12-04 LAB
MAGNESIUM SERPL-MCNC: 6.2 MG/DL — HIGH (ref 1.6–2.6)
MAGNESIUM SERPL-MCNC: 7.2 MG/DL — CRITICAL HIGH (ref 1.6–2.6)

## 2017-12-04 PROCEDURE — 85027 COMPLETE CBC AUTOMATED: CPT

## 2017-12-04 PROCEDURE — 86900 BLOOD TYPING SEROLOGIC ABO: CPT

## 2017-12-04 PROCEDURE — 85610 PROTHROMBIN TIME: CPT

## 2017-12-04 PROCEDURE — 83615 LACTATE (LD) (LDH) ENZYME: CPT

## 2017-12-04 PROCEDURE — 85018 HEMOGLOBIN: CPT

## 2017-12-04 PROCEDURE — 86780 TREPONEMA PALLIDUM: CPT

## 2017-12-04 PROCEDURE — 59050 FETAL MONITOR W/REPORT: CPT

## 2017-12-04 PROCEDURE — 83735 ASSAY OF MAGNESIUM: CPT

## 2017-12-04 PROCEDURE — 84550 ASSAY OF BLOOD/URIC ACID: CPT

## 2017-12-04 PROCEDURE — 85730 THROMBOPLASTIN TIME PARTIAL: CPT

## 2017-12-04 PROCEDURE — 86901 BLOOD TYPING SEROLOGIC RH(D): CPT

## 2017-12-04 PROCEDURE — 86850 RBC ANTIBODY SCREEN: CPT

## 2017-12-04 PROCEDURE — G0463: CPT

## 2017-12-04 PROCEDURE — 59025 FETAL NON-STRESS TEST: CPT

## 2017-12-04 PROCEDURE — 85384 FIBRINOGEN ACTIVITY: CPT

## 2017-12-04 PROCEDURE — 80053 COMPREHEN METABOLIC PANEL: CPT

## 2017-12-04 PROCEDURE — 84156 ASSAY OF PROTEIN URINE: CPT

## 2017-12-04 RX ORDER — LABETALOL HCL 100 MG
1 TABLET ORAL
Qty: 0 | Refills: 0 | COMMUNITY
Start: 2017-12-04

## 2017-12-04 RX ORDER — LABETALOL HCL 100 MG
1 TABLET ORAL
Qty: 90 | Refills: 0 | OUTPATIENT
Start: 2017-12-04 | End: 2018-01-03

## 2017-12-04 RX ADMIN — Medication 0.5 MILLIGRAM(S): at 10:59

## 2017-12-04 RX ADMIN — Medication 975 MILLIGRAM(S): at 05:30

## 2017-12-04 RX ADMIN — Medication 200 MILLIGRAM(S): at 13:12

## 2017-12-04 RX ADMIN — Medication 1 TABLET(S): at 10:59

## 2017-12-04 RX ADMIN — Medication 975 MILLIGRAM(S): at 05:08

## 2017-12-04 RX ADMIN — Medication 975 MILLIGRAM(S): at 10:59

## 2017-12-04 RX ADMIN — Medication 600 MILLIGRAM(S): at 05:08

## 2017-12-04 RX ADMIN — SODIUM CHLORIDE 3 MILLILITER(S): 9 INJECTION INTRAMUSCULAR; INTRAVENOUS; SUBCUTANEOUS at 06:09

## 2017-12-04 RX ADMIN — Medication 200 MILLIGRAM(S): at 06:05

## 2017-12-04 RX ADMIN — Medication 600 MILLIGRAM(S): at 12:14

## 2017-12-04 RX ADMIN — Medication 975 MILLIGRAM(S): at 12:13

## 2017-12-04 RX ADMIN — Medication 50 GM/HR: at 06:06

## 2017-12-04 RX ADMIN — Medication 600 MILLIGRAM(S): at 00:00

## 2017-12-04 RX ADMIN — Medication 0.5 MILLIGRAM(S): at 03:06

## 2017-12-04 RX ADMIN — Medication 600 MILLIGRAM(S): at 10:59

## 2017-12-04 NOTE — PROVIDER CONTACT NOTE (CHANGE IN STATUS NOTIFICATION) - RECOMMENDATIONS
pt scheduled to receive labetalol 200mg po at the time and med given as scheduled  BP 1hr post medication 120/70

## 2017-12-04 NOTE — PROGRESS NOTE ADULT - PROBLEM SELECTOR PLAN 1
-Continue with magnesium for seizure prophylaxis, dc at 2 PM  -Continue with labetalol 200 TID  -Reg diet  -OOB  -Routine PP Care    Todd PGY3

## 2017-12-04 NOTE — PROGRESS NOTE ADULT - SUBJECTIVE AND OBJECTIVE BOX
S: Patient doing well. Minimal lochia. Pain controlled. OOB, tolerating PO, voiding. No HA, blurry vision, CP/SOB, RUQ pain, epigastric pain    O: Vital Signs Last 24 Hrs  T(C): 36.7 (04 Dec 2017 04:42), Max: 37.1 (03 Dec 2017 11:30)  T(F): 98.1 (04 Dec 2017 04:42), Max: 98.7 (03 Dec 2017 11:30)  HR: 68 (04 Dec 2017 04:42) (54 - 104)  BP: 123/82 (04 Dec 2017 04:42) (115/65 - 178/98)  BP(mean): 84 (03 Dec 2017 15:45) (84 - 99)  RR: 18 (04 Dec 2017 04:42) (16 - 18)  SpO2: 98% (04 Dec 2017 04:42) (96% - 100%)    Gen: NAD  RRR nl s1 s2 no mrg  CTA bl  Abd: soft, NT, ND, fundus firm below umbilicus  Lochia: moderate  Ext: no tenderness    Labs:                        12.5   8.9   )-----------( 149      ( 03 Dec 2017 13:38 )             35.1

## 2017-12-04 NOTE — PROVIDER CONTACT NOTE (CHANGE IN STATUS NOTIFICATION) - ACTION/TREATMENT ORDERED:
Dr Basurto made aware of all patient BP readings and as per Dr barraza discharge as ordered by Dr Larose at this time

## 2017-12-04 NOTE — PROGRESS NOTE ADULT - ATTENDING COMMENTS
PT DOING WELL. SOME ANXIETY. MG OFF  VSS-AFEBRILE  FIRM FUNDUS  LOCHIA MILD  IMP: PPD #3- VAVD, SGHTN PP  PL: MG OFF- LEVEL = 7  CONTINUE LABETALOL 200 MG TID  PT CONCERNED ABOUT HE H/O ANXIETY. SHE WILL CONTACT HER PSYCH & RESTART LEXAPRO  GET BP CUFF FOR HOME MONITORING. BP PARAMETERS GIVEN  F/U 2 WEEKS    NATHANIEL BRADSHAW

## 2017-12-05 ENCOUNTER — INPATIENT (INPATIENT)
Facility: HOSPITAL | Age: 27
LOS: 2 days | Discharge: HOME | DRG: 776 | End: 2017-12-08
Attending: OBSTETRICS & GYNECOLOGY | Admitting: OBSTETRICS & GYNECOLOGY
Payer: COMMERCIAL

## 2017-12-05 ENCOUNTER — EMERGENCY (EMERGENCY)
Facility: HOSPITAL | Age: 27
LOS: 1 days | Discharge: ANOTHER HOSPITAL | End: 2017-12-05
Attending: EMERGENCY MEDICINE | Admitting: EMERGENCY MEDICINE
Payer: COMMERCIAL

## 2017-12-05 VITALS
TEMPERATURE: 98 F | HEART RATE: 53 BPM | SYSTOLIC BLOOD PRESSURE: 188 MMHG | DIASTOLIC BLOOD PRESSURE: 106 MMHG | OXYGEN SATURATION: 99 % | WEIGHT: 130.07 LBS | RESPIRATION RATE: 16 BRPM

## 2017-12-05 VITALS
RESPIRATION RATE: 20 BRPM | TEMPERATURE: 100 F | DIASTOLIC BLOOD PRESSURE: 84 MMHG | HEART RATE: 107 BPM | SYSTOLIC BLOOD PRESSURE: 146 MMHG | OXYGEN SATURATION: 100 %

## 2017-12-05 DIAGNOSIS — Z98.82 BREAST IMPLANT STATUS: Chronic | ICD-10-CM

## 2017-12-05 LAB
ALBUMIN SERPL ELPH-MCNC: 3 G/DL — LOW (ref 3.3–5)
ALBUMIN SERPL ELPH-MCNC: 3.5 G/DL — SIGNIFICANT CHANGE UP (ref 3.3–5)
ALP SERPL-CCNC: 139 U/L — HIGH (ref 40–120)
ALP SERPL-CCNC: 163 U/L — HIGH (ref 40–120)
ALT FLD-CCNC: 46 U/L RC — HIGH (ref 10–45)
ALT FLD-CCNC: 56 U/L — SIGNIFICANT CHANGE UP (ref 12–78)
ANION GAP SERPL CALC-SCNC: 14 MMOL/L — SIGNIFICANT CHANGE UP (ref 5–17)
ANION GAP SERPL CALC-SCNC: 8 MMOL/L — SIGNIFICANT CHANGE UP (ref 5–17)
APTT BLD: 31.1 SEC — SIGNIFICANT CHANGE UP (ref 27.5–37.4)
APTT BLD: 31.8 SEC — SIGNIFICANT CHANGE UP (ref 27.5–37.4)
AST SERPL-CCNC: 44 U/L — HIGH (ref 10–40)
AST SERPL-CCNC: 53 U/L — HIGH (ref 15–37)
BASE EXCESS BLDV CALC-SCNC: 0.3 MMOL/L — SIGNIFICANT CHANGE UP (ref -2–2)
BASOPHILS # BLD AUTO: 0 K/UL — SIGNIFICANT CHANGE UP (ref 0–0.2)
BASOPHILS # BLD AUTO: 0 K/UL — SIGNIFICANT CHANGE UP (ref 0–0.2)
BASOPHILS NFR BLD AUTO: 0.1 % — SIGNIFICANT CHANGE UP (ref 0–2)
BASOPHILS NFR BLD AUTO: 0.4 % — SIGNIFICANT CHANGE UP (ref 0–2)
BILIRUB SERPL-MCNC: 0.3 MG/DL — SIGNIFICANT CHANGE UP (ref 0.2–1.2)
BILIRUB SERPL-MCNC: 0.4 MG/DL — SIGNIFICANT CHANGE UP (ref 0.2–1.2)
BUN SERPL-MCNC: 11 MG/DL — SIGNIFICANT CHANGE UP (ref 7–23)
BUN SERPL-MCNC: 9 MG/DL — SIGNIFICANT CHANGE UP (ref 7–23)
CA-I SERPL-SCNC: 1.19 MMOL/L — SIGNIFICANT CHANGE UP (ref 1.12–1.3)
CALCIUM SERPL-MCNC: 8.8 MG/DL — SIGNIFICANT CHANGE UP (ref 8.5–10.1)
CALCIUM SERPL-MCNC: 9.2 MG/DL — SIGNIFICANT CHANGE UP (ref 8.4–10.5)
CHLORIDE BLDV-SCNC: 112 MMOL/L — HIGH (ref 96–108)
CHLORIDE SERPL-SCNC: 106 MMOL/L — SIGNIFICANT CHANGE UP (ref 96–108)
CHLORIDE SERPL-SCNC: 108 MMOL/L — SIGNIFICANT CHANGE UP (ref 96–108)
CO2 BLDV-SCNC: 23 MMOL/L — SIGNIFICANT CHANGE UP (ref 22–30)
CO2 SERPL-SCNC: 21 MMOL/L — LOW (ref 22–31)
CO2 SERPL-SCNC: 24 MMOL/L — SIGNIFICANT CHANGE UP (ref 22–31)
CREAT SERPL-MCNC: 0.6 MG/DL — SIGNIFICANT CHANGE UP (ref 0.5–1.3)
CREAT SERPL-MCNC: 0.74 MG/DL — SIGNIFICANT CHANGE UP (ref 0.5–1.3)
EOSINOPHIL # BLD AUTO: 0.1 K/UL — SIGNIFICANT CHANGE UP (ref 0–0.5)
EOSINOPHIL # BLD AUTO: 0.1 K/UL — SIGNIFICANT CHANGE UP (ref 0–0.5)
EOSINOPHIL NFR BLD AUTO: 0.7 % — SIGNIFICANT CHANGE UP (ref 0–6)
EOSINOPHIL NFR BLD AUTO: 1.1 % — SIGNIFICANT CHANGE UP (ref 0–6)
FIBRINOGEN PPP-MCNC: 860 MG/DL — SIGNIFICANT CHANGE UP (ref 310–510)
GAS PNL BLDV: 137 MMOL/L — SIGNIFICANT CHANGE UP (ref 136–145)
GAS PNL BLDV: SIGNIFICANT CHANGE UP
GAS PNL BLDV: SIGNIFICANT CHANGE UP
GLUCOSE BLDV-MCNC: 96 MG/DL — SIGNIFICANT CHANGE UP (ref 70–99)
GLUCOSE SERPL-MCNC: 91 MG/DL — SIGNIFICANT CHANGE UP (ref 70–99)
GLUCOSE SERPL-MCNC: 97 MG/DL — SIGNIFICANT CHANGE UP (ref 70–99)
HCO3 BLDV-SCNC: 22 MMOL/L — SIGNIFICANT CHANGE UP (ref 21–29)
HCT VFR BLD CALC: 38.7 % — SIGNIFICANT CHANGE UP (ref 34.5–45)
HCT VFR BLD CALC: 40.2 % — SIGNIFICANT CHANGE UP (ref 34.5–45)
HCT VFR BLDA CALC: 42 % — SIGNIFICANT CHANGE UP (ref 39–50)
HGB BLD CALC-MCNC: 13.6 G/DL — SIGNIFICANT CHANGE UP (ref 11.5–15.5)
HGB BLD-MCNC: 13.4 G/DL — SIGNIFICANT CHANGE UP (ref 11.5–15.5)
HGB BLD-MCNC: 13.4 G/DL — SIGNIFICANT CHANGE UP (ref 11.5–15.5)
HOROWITZ INDEX BLDV+IHG-RTO: SIGNIFICANT CHANGE UP
INR BLD: 0.99 RATIO — SIGNIFICANT CHANGE UP (ref 0.88–1.16)
INR BLD: 1 RATIO — SIGNIFICANT CHANGE UP (ref 0.88–1.16)
LACTATE BLDV-MCNC: 1.2 MMOL/L — SIGNIFICANT CHANGE UP (ref 0.7–2)
LDH SERPL L TO P-CCNC: 465 U/L — HIGH (ref 50–242)
LYMPHOCYTES # BLD AUTO: 0.7 K/UL — LOW (ref 1–3.3)
LYMPHOCYTES # BLD AUTO: 0.7 K/UL — LOW (ref 1–3.3)
LYMPHOCYTES # BLD AUTO: 6.4 % — LOW (ref 13–44)
LYMPHOCYTES # BLD AUTO: 9.4 % — LOW (ref 13–44)
MAGNESIUM SERPL-MCNC: 4.4 MG/DL — HIGH (ref 1.6–2.6)
MCHC RBC-ENTMCNC: 29.4 PG — SIGNIFICANT CHANGE UP (ref 27–34)
MCHC RBC-ENTMCNC: 30.8 PG — SIGNIFICANT CHANGE UP (ref 27–34)
MCHC RBC-ENTMCNC: 33.2 GM/DL — SIGNIFICANT CHANGE UP (ref 32–36)
MCHC RBC-ENTMCNC: 34.6 GM/DL — SIGNIFICANT CHANGE UP (ref 32–36)
MCV RBC AUTO: 88.4 FL — SIGNIFICANT CHANGE UP (ref 80–100)
MCV RBC AUTO: 89.1 FL — SIGNIFICANT CHANGE UP (ref 80–100)
MONOCYTES # BLD AUTO: 0.4 K/UL — SIGNIFICANT CHANGE UP (ref 0–0.9)
MONOCYTES # BLD AUTO: 0.4 K/UL — SIGNIFICANT CHANGE UP (ref 0–0.9)
MONOCYTES NFR BLD AUTO: 3.7 % — SIGNIFICANT CHANGE UP (ref 2–14)
MONOCYTES NFR BLD AUTO: 5.3 % — SIGNIFICANT CHANGE UP (ref 1–9)
NEUTROPHILS # BLD AUTO: 6.4 K/UL — SIGNIFICANT CHANGE UP (ref 1.8–7.4)
NEUTROPHILS # BLD AUTO: 9.9 K/UL — HIGH (ref 1.8–7.4)
NEUTROPHILS NFR BLD AUTO: 83.7 % — HIGH (ref 43–77)
NEUTROPHILS NFR BLD AUTO: 89.1 % — HIGH (ref 43–77)
PCO2 BLDV: 30 MMHG — LOW (ref 35–50)
PH BLDV: 7.49 — HIGH (ref 7.35–7.45)
PLATELET # BLD AUTO: 196 K/UL — SIGNIFICANT CHANGE UP (ref 150–400)
PLATELET # BLD AUTO: 198 K/UL — SIGNIFICANT CHANGE UP (ref 150–400)
PO2 BLDV: 73 MMHG — HIGH (ref 25–45)
POTASSIUM BLDV-SCNC: 3.9 MMOL/L — SIGNIFICANT CHANGE UP (ref 3.5–5)
POTASSIUM SERPL-MCNC: 3.9 MMOL/L — SIGNIFICANT CHANGE UP (ref 3.5–5.3)
POTASSIUM SERPL-MCNC: 4.2 MMOL/L — SIGNIFICANT CHANGE UP (ref 3.5–5.3)
POTASSIUM SERPL-SCNC: 3.9 MMOL/L — SIGNIFICANT CHANGE UP (ref 3.5–5.3)
POTASSIUM SERPL-SCNC: 4.2 MMOL/L — SIGNIFICANT CHANGE UP (ref 3.5–5.3)
PROT SERPL-MCNC: 6.4 G/DL — SIGNIFICANT CHANGE UP (ref 6–8.3)
PROT SERPL-MCNC: 7.3 G/DL — SIGNIFICANT CHANGE UP (ref 6–8.3)
PROTHROM AB SERPL-ACNC: 10.7 SEC — SIGNIFICANT CHANGE UP (ref 9.8–12.7)
PROTHROM AB SERPL-ACNC: 10.9 SEC — SIGNIFICANT CHANGE UP (ref 9.8–12.7)
RBC # BLD: 4.35 M/UL — SIGNIFICANT CHANGE UP (ref 3.8–5.2)
RBC # BLD: 4.55 M/UL — SIGNIFICANT CHANGE UP (ref 3.8–5.2)
RBC # FLD: 11.2 % — SIGNIFICANT CHANGE UP (ref 10.3–14.5)
RBC # FLD: 11.3 % — SIGNIFICANT CHANGE UP (ref 10.3–14.5)
SAO2 % BLDV: 95 % — HIGH (ref 67–88)
SODIUM SERPL-SCNC: 138 MMOL/L — SIGNIFICANT CHANGE UP (ref 135–145)
SODIUM SERPL-SCNC: 143 MMOL/L — SIGNIFICANT CHANGE UP (ref 135–145)
URATE SERPL-MCNC: 4.3 MG/DL — SIGNIFICANT CHANGE UP (ref 2.5–7)
WBC # BLD: 11.1 K/UL — HIGH (ref 3.8–10.5)
WBC # BLD: 7.6 K/UL — SIGNIFICANT CHANGE UP (ref 3.8–10.5)
WBC # FLD AUTO: 11.1 K/UL — HIGH (ref 3.8–10.5)
WBC # FLD AUTO: 7.6 K/UL — SIGNIFICANT CHANGE UP (ref 3.8–10.5)

## 2017-12-05 PROCEDURE — 99291 CRITICAL CARE FIRST HOUR: CPT | Mod: 25

## 2017-12-05 PROCEDURE — 96375 TX/PRO/DX INJ NEW DRUG ADDON: CPT

## 2017-12-05 PROCEDURE — 80053 COMPREHEN METABOLIC PANEL: CPT

## 2017-12-05 PROCEDURE — 85610 PROTHROMBIN TIME: CPT

## 2017-12-05 PROCEDURE — 93010 ELECTROCARDIOGRAM REPORT: CPT

## 2017-12-05 PROCEDURE — 85730 THROMBOPLASTIN TIME PARTIAL: CPT

## 2017-12-05 PROCEDURE — 85027 COMPLETE CBC AUTOMATED: CPT

## 2017-12-05 PROCEDURE — 93005 ELECTROCARDIOGRAM TRACING: CPT

## 2017-12-05 PROCEDURE — 96365 THER/PROPH/DIAG IV INF INIT: CPT

## 2017-12-05 PROCEDURE — 83735 ASSAY OF MAGNESIUM: CPT

## 2017-12-05 PROCEDURE — 99285 EMERGENCY DEPT VISIT HI MDM: CPT | Mod: 25

## 2017-12-05 PROCEDURE — 70450 CT HEAD/BRAIN W/O DYE: CPT | Mod: 26

## 2017-12-05 PROCEDURE — 99291 CRITICAL CARE FIRST HOUR: CPT

## 2017-12-05 RX ORDER — MAGNESIUM SULFATE 500 MG/ML
2 VIAL (ML) INJECTION
Qty: 0 | Refills: 0 | Status: DISCONTINUED | OUTPATIENT
Start: 2017-12-05 | End: 2017-12-09

## 2017-12-05 RX ORDER — TETANUS TOXOID, REDUCED DIPHTHERIA TOXOID AND ACELLULAR PERTUSSIS VACCINE, ADSORBED 5; 2.5; 8; 8; 2.5 [IU]/.5ML; [IU]/.5ML; UG/.5ML; UG/.5ML; UG/.5ML
0.5 SUSPENSION INTRAMUSCULAR ONCE
Qty: 0 | Refills: 0 | Status: DISCONTINUED | OUTPATIENT
Start: 2017-12-05 | End: 2017-12-08

## 2017-12-05 RX ORDER — LABETALOL HCL 100 MG
20 TABLET ORAL ONCE
Qty: 0 | Refills: 0 | Status: DISCONTINUED | OUTPATIENT
Start: 2017-12-05 | End: 2017-12-05

## 2017-12-05 RX ORDER — SODIUM CHLORIDE 9 MG/ML
1000 INJECTION, SOLUTION INTRAVENOUS
Qty: 0 | Refills: 0 | Status: DISCONTINUED | OUTPATIENT
Start: 2017-12-05 | End: 2017-12-08

## 2017-12-05 RX ORDER — LABETALOL HCL 100 MG
30 TABLET ORAL EVERY 8 HOURS
Qty: 0 | Refills: 0 | Status: DISCONTINUED | OUTPATIENT
Start: 2017-12-05 | End: 2017-12-05

## 2017-12-05 RX ORDER — DOCUSATE SODIUM 100 MG
100 CAPSULE ORAL
Qty: 0 | Refills: 0 | Status: DISCONTINUED | OUTPATIENT
Start: 2017-12-05 | End: 2017-12-08

## 2017-12-05 RX ORDER — SIMETHICONE 80 MG/1
80 TABLET, CHEWABLE ORAL EVERY 6 HOURS
Qty: 0 | Refills: 0 | Status: DISCONTINUED | OUTPATIENT
Start: 2017-12-05 | End: 2017-12-08

## 2017-12-05 RX ORDER — OXYTOCIN 10 UNIT/ML
41.67 VIAL (ML) INJECTION
Qty: 20 | Refills: 0 | Status: DISCONTINUED | OUTPATIENT
Start: 2017-12-05 | End: 2017-12-08

## 2017-12-05 RX ORDER — HYDROCORTISONE 1 %
1 OINTMENT (GRAM) TOPICAL EVERY 4 HOURS
Qty: 0 | Refills: 0 | Status: DISCONTINUED | OUTPATIENT
Start: 2017-12-05 | End: 2017-12-08

## 2017-12-05 RX ORDER — MAGNESIUM HYDROXIDE 400 MG/1
30 TABLET, CHEWABLE ORAL
Qty: 0 | Refills: 0 | Status: DISCONTINUED | OUTPATIENT
Start: 2017-12-05 | End: 2017-12-08

## 2017-12-05 RX ORDER — MAGNESIUM SULFATE 500 MG/ML
2 VIAL (ML) INJECTION
Qty: 40 | Refills: 0 | Status: DISCONTINUED | OUTPATIENT
Start: 2017-12-05 | End: 2017-12-06

## 2017-12-05 RX ORDER — DIPHENHYDRAMINE HCL 50 MG
25 CAPSULE ORAL EVERY 6 HOURS
Qty: 0 | Refills: 0 | Status: DISCONTINUED | OUTPATIENT
Start: 2017-12-05 | End: 2017-12-08

## 2017-12-05 RX ORDER — PRAMOXINE HYDROCHLORIDE 150 MG/15G
1 AEROSOL, FOAM RECTAL EVERY 4 HOURS
Qty: 0 | Refills: 0 | Status: DISCONTINUED | OUTPATIENT
Start: 2017-12-05 | End: 2017-12-08

## 2017-12-05 RX ORDER — SODIUM CHLORIDE 9 MG/ML
1000 INJECTION INTRAMUSCULAR; INTRAVENOUS; SUBCUTANEOUS ONCE
Qty: 0 | Refills: 0 | Status: COMPLETED | OUTPATIENT
Start: 2017-12-05 | End: 2017-12-05

## 2017-12-05 RX ORDER — MAGNESIUM SULFATE 500 MG/ML
4 VIAL (ML) INJECTION ONCE
Qty: 0 | Refills: 0 | Status: DISCONTINUED | OUTPATIENT
Start: 2017-12-05 | End: 2017-12-05

## 2017-12-05 RX ORDER — MAGNESIUM SULFATE 500 MG/ML
2 VIAL (ML) INJECTION
Qty: 40 | Refills: 0 | Status: DISCONTINUED | OUTPATIENT
Start: 2017-12-05 | End: 2017-12-05

## 2017-12-05 RX ORDER — GLYCERIN ADULT
1 SUPPOSITORY, RECTAL RECTAL AT BEDTIME
Qty: 0 | Refills: 0 | Status: DISCONTINUED | OUTPATIENT
Start: 2017-12-05 | End: 2017-12-08

## 2017-12-05 RX ORDER — ALPRAZOLAM 0.25 MG
0.5 TABLET ORAL ONCE
Qty: 0 | Refills: 0 | Status: DISCONTINUED | OUTPATIENT
Start: 2017-12-05 | End: 2017-12-08

## 2017-12-05 RX ORDER — SODIUM CHLORIDE 9 MG/ML
3 INJECTION INTRAMUSCULAR; INTRAVENOUS; SUBCUTANEOUS ONCE
Qty: 0 | Refills: 0 | Status: COMPLETED | OUTPATIENT
Start: 2017-12-05 | End: 2017-12-05

## 2017-12-05 RX ORDER — LABETALOL HCL 100 MG
300 TABLET ORAL EVERY 8 HOURS
Qty: 0 | Refills: 0 | Status: DISCONTINUED | OUTPATIENT
Start: 2017-12-05 | End: 2017-12-08

## 2017-12-05 RX ORDER — MAGNESIUM SULFATE 500 MG/ML
2 VIAL (ML) INJECTION ONCE
Qty: 0 | Refills: 0 | Status: DISCONTINUED | OUTPATIENT
Start: 2017-12-05 | End: 2017-12-05

## 2017-12-05 RX ORDER — SODIUM CHLORIDE 9 MG/ML
3 INJECTION INTRAMUSCULAR; INTRAVENOUS; SUBCUTANEOUS EVERY 8 HOURS
Qty: 0 | Refills: 0 | Status: DISCONTINUED | OUTPATIENT
Start: 2017-12-05 | End: 2017-12-08

## 2017-12-05 RX ORDER — ACETAMINOPHEN 500 MG
1000 TABLET ORAL ONCE
Qty: 0 | Refills: 0 | Status: COMPLETED | OUTPATIENT
Start: 2017-12-05 | End: 2017-12-05

## 2017-12-05 RX ORDER — HYDRALAZINE HCL 50 MG
10 TABLET ORAL ONCE
Qty: 0 | Refills: 0 | Status: COMPLETED | OUTPATIENT
Start: 2017-12-05 | End: 2017-12-05

## 2017-12-05 RX ORDER — DIBUCAINE 1 %
1 OINTMENT (GRAM) RECTAL EVERY 4 HOURS
Qty: 0 | Refills: 0 | Status: DISCONTINUED | OUTPATIENT
Start: 2017-12-05 | End: 2017-12-08

## 2017-12-05 RX ORDER — LANOLIN
1 OINTMENT (GRAM) TOPICAL EVERY 6 HOURS
Qty: 0 | Refills: 0 | Status: DISCONTINUED | OUTPATIENT
Start: 2017-12-05 | End: 2017-12-08

## 2017-12-05 RX ORDER — ALPRAZOLAM 0.25 MG
1 TABLET ORAL THREE TIMES A DAY
Qty: 0 | Refills: 0 | Status: DISCONTINUED | OUTPATIENT
Start: 2017-12-05 | End: 2017-12-08

## 2017-12-05 RX ORDER — AER TRAVELER 0.5 G/1
1 SOLUTION RECTAL; TOPICAL EVERY 4 HOURS
Qty: 0 | Refills: 0 | Status: DISCONTINUED | OUTPATIENT
Start: 2017-12-05 | End: 2017-12-08

## 2017-12-05 RX ADMIN — Medication 400 MILLIGRAM(S): at 11:40

## 2017-12-05 RX ADMIN — Medication 300 MILLIGRAM(S): at 22:09

## 2017-12-05 RX ADMIN — Medication 10 MILLIGRAM(S): at 10:10

## 2017-12-05 RX ADMIN — Medication 1 MILLIGRAM(S): at 16:45

## 2017-12-05 RX ADMIN — Medication 10 MILLIGRAM(S): at 09:55

## 2017-12-05 RX ADMIN — SODIUM CHLORIDE 3 MILLILITER(S): 9 INJECTION INTRAMUSCULAR; INTRAVENOUS; SUBCUTANEOUS at 10:25

## 2017-12-05 RX ADMIN — Medication 50 GRAM(S): at 10:25

## 2017-12-05 RX ADMIN — Medication 50 GM/HR: at 12:40

## 2017-12-05 RX ADMIN — SODIUM CHLORIDE 3 MILLILITER(S): 9 INJECTION INTRAMUSCULAR; INTRAVENOUS; SUBCUTANEOUS at 22:38

## 2017-12-05 RX ADMIN — SODIUM CHLORIDE 1000 MILLILITER(S): 9 INJECTION INTRAMUSCULAR; INTRAVENOUS; SUBCUTANEOUS at 10:25

## 2017-12-05 RX ADMIN — Medication 300 MILLIGRAM(S): at 14:06

## 2017-12-05 NOTE — ED PROVIDER NOTE - PHYSICAL EXAMINATION
Gen:  alert, awake, no acute distress  HEENT:  atraumatic head, airway clear, pupils equal and round  CV:  rrr, nl S1, S2, no m/r/g  Breasts: engorged  Pulm:  BS equal b/l  Abd: s/nt/nd, +BS  Ext:  moving all extremities  Neuro:  grossly intact, no deficits  Skin:  clear, dry, intact

## 2017-12-05 NOTE — ED ADULT NURSE NOTE - OBJECTIVE STATEMENT
27 y.o. Female transferred from [place] for hypertension. EMS reports pt delivered baby via vaginal birth on Dec 2nd. Pt was hypertensive after delivery and was d/c yesterday with labetalol. 27 y.o. Female transferred from [place] for hypertension. EMS reports pt delivered baby via vaginal birth on Dec 2nd. Pt was hypertensive after delivery and was d/c yesterday with labetalol. Pt woke up today feeling headache and seeing spots. 27 y.o. Female transferred from [place] for hypertension. Hx anxiety. EMS reports pt delivered baby via vaginal birth with no complications on Dec 2nd. Pt was hypertensive postpartum and was d/c yesterday with labetalol. Pt woke up today having headache and seeing spots. Pt had bp of 210/110 in [place] as per EMS and was given hydralazine 10mg x2 and magnesium 2grams. Magnesium was completed upon arrival. Pt arrived with 20g on L AC and 22g on R AC from [facility]. Pt's reflexes are WNL. Neuro intact. Pt is not complaining of headache. Pt states feeling discomfort on chest and feeling tingly on top of head. Pt feels anxious - states having oxygen on makes her feel better, pt placed on 2L NC. Denies CP, SOB, N/V/D, urinary/bowel complications, fever/chills. Pt is in no current distress. Comfort and safety provided. Dr. Lee at bedside for assessment. Mother at bedside. Will continue to monitor. 27 y.o. Female transferred from [place] for hypertension. Hx anxiety. EMS reports pt delivered baby via vaginal birth with no complications on Dec 2nd. Pt was hypertensive postpartum and was d/c yesterday with labetalol. Pt woke up today having headache and seeing spots. Pt had bp of 210/110 in [place] as per EMS and was given hydralazine 10mg x2 and magnesium 2grams. Magnesium was completed upon arrival. Pt arrived with 20g on L AC and 22g on R AC from [facility]. Pt's reflexes are WNL. Neuro intact. Pt is not complaining of headache. Pt states feeling discomfort on chest and feeling tingly on top of head. Pt feels anxious - states having oxygen on makes her feel better, pt placed on 2L NC. Denies CP, SOB, N/V/D, urinary/bowel complications, fever/chills. Pt is in no current distress. Comfort and safety provided. Dr. Lee at bedside for assessment. Mother at bedside. Will continue to monitor. See Neuro flowsheet in chart for neuro assessment.

## 2017-12-05 NOTE — ED ADULT NURSE NOTE - CHPI ED SYMPTOMS NEG
no back pain/no dizziness/no vomiting/no nausea/no cough/no shortness of breath/no chills/no chest pain/no fever/no syncope/no diaphoresis

## 2017-12-05 NOTE — H&P ADULT - ASSESSMENT
26 y/o  PPD#4 s/p VAVD with postpartum course c/b preeclampsia with severe features requiring magnesium presented with severe range BP and visual changes.  BP controlled s/p Hydralazine and magnesium loading dose of 2 g.  HELLP labs wnl.

## 2017-12-05 NOTE — ED ADULT NURSE REASSESSMENT NOTE - NS ED NURSE REASSESS COMMENT FT1
Report given to Yosvany at transfer center. Pt to be transferred to [facility] and chris to ED

## 2017-12-05 NOTE — ED PROVIDER NOTE - OBJECTIVE STATEMENT
pt s/p vaginal delivery 2 days ago at [place], had elevated BP after delivery and was treated for pre-eclampsia and discharged on labetalol.  this morning pt states that she did not feel right, had some chest pain and mild HA. very elevated BP

## 2017-12-05 NOTE — ED PROVIDER NOTE - OBJECTIVE STATEMENT
3 6F 2d status post delivery with peripartum course c/b Hypertension t/w magnesium and d/c'd on labetalol transferred from [place] ED for Hypertension.  C/o spots in vision, chest pain, headache.  At [place] had BP in 220s, given 2 Mg, 10x2 hydralazine.

## 2017-12-05 NOTE — H&P ADULT - NSHPPHYSICALEXAM_GEN_ALL_CORE
Vital Signs Last 24 Hrs  T(C): 37 (05 Dec 2017 12:00), Max: 37.6 (05 Dec 2017 10:55)  T(F): 98.6 (05 Dec 2017 12:00), Max: 99.7 (05 Dec 2017 10:55)  HR: 100 (05 Dec 2017 14:45) (53 - 110)  BP: 136/74 (05 Dec 2017 14:45) (118/61 - 188/106)  BP(mean): 99 (05 Dec 2017 14:45) (84 - 100)  RR: 15 (05 Dec 2017 14:45) (15 - 20)  SpO2: 97% (05 Dec 2017 14:45) (97% - 100%)    PHYSICAL EXAM:    Constitutional: alert and oriented x 3    Respiratory: clear    Cardiovascular: regular rate and rhythm    Gastrointestinal: soft, non tender, no epigastric tenderness, + bowel sounds. No hepatosplenomegaly, no palpable masses    Extremities: reflexes 3+ b/l    Skin: flushing

## 2017-12-05 NOTE — H&P ADULT - NSHPLABSRESULTS_GEN_ALL_CORE
LABS:                        13.4   11.1  )-----------( 198      ( 05 Dec 2017 11:42 )             38.7     12-05    143  |  108  |  9   ----------------------------<  97  3.9   |  21<L>  |  0.60    Ca    9.2      05 Dec 2017 11:42  Mg     2.1     12-05    TPro  6.4  /  Alb  3.5  /  TBili  0.3  /  DBili  x   /  AST  44<H>  /  ALT  46<H>  /  AlkPhos  139<H>  12-05    PT/INR - ( 05 Dec 2017 11:42 )   PT: 10.7 sec;   INR: 0.99 ratio         PTT - ( 05 Dec 2017 11:42 )  PTT:31.1 sec      Blood Type: A Positive      RADIOLOGY & ADDITIONAL STUDIES:  CT Head (12/5:      EXAM:  CT BRAIN                            PROCEDURE DATE:  12/05/2017            INTERPRETATION:    CLINICAL INDICATION: Visual changes after delivery, recently postpartum,   headache, preeclampsia    5mm axial sections of the brain were obtained from base to vertex,   without the intravenous administration of contrast material. Coronal and   sagittal computer generated reconstructed views are available.    No prior brain imaging is available for comparison.    The fourth, third and lateral ventricles are normal size and position.   There is no hemorrhage, mass or shift of the midline structures. There is   normal gray white matter differentiation. Bone window examination is   unremarkable.    IMPRESSION: Unremarkable noncontrast CT of the brain.                        SINA ZELAYA M.D., ATTENDING RADIOLOGIST  This document has been electronically signed. Dec  5 2017 11:31AM

## 2017-12-05 NOTE — ED PROVIDER NOTE - CRITICAL CARE PROVIDED
additional history taking/consult w/ pt's family directly relating to pts condition/interpretation of diagnostic studies/consultation with other physicians/direct patient care (not related to procedure)/documentation

## 2017-12-05 NOTE — H&P ADULT - PROBLEM SELECTOR PLAN 1
-Magnesium infusion started, consider discontinuing as BPs are well-controlled  -Increase labetalol to 300 TID.  Consider adding second antihypertensive  -Monitor BPs, signs, and symptoms    dw Dr Coy Chaney PGY2

## 2017-12-05 NOTE — H&P ADULT - ATTENDING COMMENTS
Patient seen and examined. Resident note reviewed. Patient was c/o headache when I saw her at about 3 Pm. She denied CP or SOB. She reported floaters at times and mild HA.  BP's are 136/74  Reflexes 2+, no edema  fundus firm and NT  Labs reviewed  A/P: Preeclampsia with severe features  Will discontinue Magnesium at 24 hours  Labetolol 300 mg po tid  Reassess in AM  Xanax for anxiety-pt reports 0.5 mg was not effective. will try 1 mg q 8 hrs prn

## 2017-12-05 NOTE — ED PROVIDER NOTE - PROGRESS NOTE DETAILS
pt being transferred to [facility] and [place].  hydralazine given with good response to BP, CT head will be done at [place].  pt neurologically intact, no deficits, awake alert, behaving at baseline.

## 2017-12-05 NOTE — ED PROVIDER NOTE - ATTENDING CONTRIBUTION TO CARE
Patient transferred from [facility] for post partum pre-ecclampsia.  Post partum day 2.  Reporting headaches this morning, went to [place], found to be significantly hypertensive despite home labetalol, transferred [facility] for OB, received hydralazine/mag prior to transfer.    On exam patient mildly hypertensive to 140s, otherwise vital signs within normal limits, non toxic appearing, RRR, non labored respirations, moving all extremities.    OBGYN consulted, requesting CT head given headaches, repeat labs, start magnesium infusion, admit L&D.

## 2017-12-05 NOTE — H&P ADULT - HISTORY OF PRESENT ILLNESS
28 y/o  s/p VAVD on  complicated by postpartum preeclampsia.  Patient was discharged yesterday in stable condition.  This morning, patient woke with blurry vision and presented to [place] ED.  There, she had SBPs in 200s.  She received Hydralazine 20 IVP and Magnesium loading bolus.  Upon arrival here, patient has mild headache, BPs 146/84 28 y/o  s/p VAVD on  complicated by postpartum preeclampsia.  Patient was discharged yesterday in stable condition.  This morning, patient woke with blurry vision and presented to [place] ED.  There, she had SBPs in 200s.  She received Hydralazine 20 IVP and Magnesium loading bolus.  Upon arrival here, patient has mild headache, BPs 146/84.  Blurry vision has resolved.  Patient denies epigastric pain.  She is pumping, has normal lochia, is voiding and moving her bowels without problem. 26 y/o  s/p VAVD on  complicated by postpartum preeclampsia.  Patient was discharged yesterday in stable condition.  This morning, patient woke with blurry vision and presented to [place] ED.  There, she had SBPs in 200s.  She received Hydralazine 20 IVP and Magnesium loading bolus.  Upon arrival here, patient has mild headache, BPs 146/84.  Blurry vision has resolved.  Patient denies epigastric pain.  She is pumping, has normal lochia, is voiding and moving her bowels without problem.      27y G_P_ presents with   HPI:  26 y/o  s/p VAVD on  complicated by postpartum preeclampsia.  Patient was discharged yesterday in stable condition.  This morning, patient woke with blurry vision and presented to [place] ED.  There, she had SBPs in 200s.  She received Hydralazine 20 IVP and Magnesium loading bolus.  Upon arrival here, patient has mild headache, BPs 146/84.  Blurry vision has resolved.  Patient denies epigastric pain.  She is pumping, has normal lochia, is voiding and moving her bowels without problem. (05 Dec 2017 11:42)    While inpatient, patient received Magnesium infusion from 12/3-.  Her blood pressure was initially controlled with procardia 10 IR.  Patient was discharged on  on Labetalol 200 TID with BPs well-controlled and asymptomatic.

## 2017-12-05 NOTE — ED ADULT NURSE NOTE - OBJECTIVE STATEMENT
Pt to ED c/o hypertension, pt is s/p vaginal delivery x2 days, was dx'd preclampic after delivery, was discharged yesterday from [place] instructed to take BP meds at home, woke today, with SOB, visual deficits/blurry vision and weakness.

## 2017-12-06 LAB
APPEARANCE UR: ABNORMAL
BILIRUB UR-MCNC: NEGATIVE — SIGNIFICANT CHANGE UP
COLOR SPEC: COLORLESS — SIGNIFICANT CHANGE UP
DIFF PNL FLD: ABNORMAL
GLUCOSE UR QL: NEGATIVE — SIGNIFICANT CHANGE UP
KETONES UR-MCNC: NEGATIVE — SIGNIFICANT CHANGE UP
LEUKOCYTE ESTERASE UR-ACNC: ABNORMAL
MAGNESIUM SERPL-MCNC: 6 MG/DL — HIGH (ref 1.6–2.6)
MAGNESIUM SERPL-MCNC: 6.6 MG/DL — HIGH (ref 1.6–2.6)
NITRITE UR-MCNC: POSITIVE
PH UR: 6 — SIGNIFICANT CHANGE UP (ref 5–8)
PROT UR-MCNC: SIGNIFICANT CHANGE UP
SP GR SPEC: 1.01 — LOW (ref 1.01–1.02)
UROBILINOGEN FLD QL: NEGATIVE — SIGNIFICANT CHANGE UP

## 2017-12-06 PROCEDURE — 71010: CPT | Mod: 26

## 2017-12-06 RX ORDER — NITROFURANTOIN MACROCRYSTAL 50 MG
100 CAPSULE ORAL
Qty: 0 | Refills: 0 | Status: DISCONTINUED | OUTPATIENT
Start: 2017-12-06 | End: 2017-12-08

## 2017-12-06 RX ORDER — ACETAMINOPHEN 500 MG
975 TABLET ORAL EVERY 6 HOURS
Qty: 0 | Refills: 0 | Status: DISCONTINUED | OUTPATIENT
Start: 2017-12-06 | End: 2017-12-08

## 2017-12-06 RX ORDER — HYDRALAZINE HCL 50 MG
5 TABLET ORAL ONCE
Qty: 0 | Refills: 0 | Status: COMPLETED | OUTPATIENT
Start: 2017-12-06 | End: 2017-12-06

## 2017-12-06 RX ORDER — DIPHENHYDRAMINE HCL 50 MG
25 CAPSULE ORAL ONCE
Qty: 0 | Refills: 0 | Status: DISCONTINUED | OUTPATIENT
Start: 2017-12-06 | End: 2017-12-08

## 2017-12-06 RX ORDER — NIFEDIPINE 30 MG
10 TABLET, EXTENDED RELEASE 24 HR ORAL ONCE
Qty: 0 | Refills: 0 | Status: COMPLETED | OUTPATIENT
Start: 2017-12-06 | End: 2017-12-06

## 2017-12-06 RX ORDER — ESCITALOPRAM OXALATE 10 MG/1
20 TABLET, FILM COATED ORAL DAILY
Qty: 0 | Refills: 0 | Status: DISCONTINUED | OUTPATIENT
Start: 2017-12-06 | End: 2017-12-08

## 2017-12-06 RX ORDER — NIFEDIPINE 30 MG
30 TABLET, EXTENDED RELEASE 24 HR ORAL AT BEDTIME
Qty: 0 | Refills: 0 | Status: DISCONTINUED | OUTPATIENT
Start: 2017-12-06 | End: 2017-12-08

## 2017-12-06 RX ADMIN — Medication 100 MILLIGRAM(S): at 10:01

## 2017-12-06 RX ADMIN — Medication 1 MILLIGRAM(S): at 00:06

## 2017-12-06 RX ADMIN — Medication 975 MILLIGRAM(S): at 18:30

## 2017-12-06 RX ADMIN — Medication 100 MILLIGRAM(S): at 18:41

## 2017-12-06 RX ADMIN — Medication 975 MILLIGRAM(S): at 05:13

## 2017-12-06 RX ADMIN — Medication 975 MILLIGRAM(S): at 17:55

## 2017-12-06 RX ADMIN — SODIUM CHLORIDE 3 MILLILITER(S): 9 INJECTION INTRAMUSCULAR; INTRAVENOUS; SUBCUTANEOUS at 17:09

## 2017-12-06 RX ADMIN — SODIUM CHLORIDE 3 MILLILITER(S): 9 INJECTION INTRAMUSCULAR; INTRAVENOUS; SUBCUTANEOUS at 06:33

## 2017-12-06 RX ADMIN — Medication 300 MILLIGRAM(S): at 06:26

## 2017-12-06 RX ADMIN — Medication 975 MILLIGRAM(S): at 12:30

## 2017-12-06 RX ADMIN — Medication 5 MILLIGRAM(S): at 21:17

## 2017-12-06 RX ADMIN — Medication 300 MILLIGRAM(S): at 22:06

## 2017-12-06 RX ADMIN — Medication 975 MILLIGRAM(S): at 06:04

## 2017-12-06 RX ADMIN — Medication 10 MILLIGRAM(S): at 12:42

## 2017-12-06 RX ADMIN — Medication 30 MILLIGRAM(S): at 23:16

## 2017-12-06 RX ADMIN — Medication 975 MILLIGRAM(S): at 11:36

## 2017-12-06 RX ADMIN — Medication 300 MILLIGRAM(S): at 12:30

## 2017-12-06 RX ADMIN — Medication 1 MILLIGRAM(S): at 22:06

## 2017-12-06 RX ADMIN — Medication 1 MILLIGRAM(S): at 12:13

## 2017-12-06 RX ADMIN — SODIUM CHLORIDE 3 MILLILITER(S): 9 INJECTION INTRAMUSCULAR; INTRAVENOUS; SUBCUTANEOUS at 12:30

## 2017-12-06 NOTE — PROVIDER CONTACT NOTE (OTHER) - ACTION/TREATMENT ORDERED:
Evaluated by KASSY Miles Labetalol 300mg and Percardia 10mg given stat. BP rechecked and is 166/98.  Will continue to monitor.

## 2017-12-06 NOTE — CHART NOTE - NSCHARTNOTEFT_GEN_A_CORE
CHRIS ELENA   HD#1    S: Pt seen and examined for blurry vision, feeling like patient could not take a deep breath due to tightening and elevated BP of 154/80 manually and 164/90 electronically. Pt had just taken Xanax 1 mg orally due to anxiety but reported no relief of symptoms. She was due for labetalol 300mg at 2pm but will give it now. Pt denies scotoma, CP, palpitations, arm/hand tingling or numbness, n/v, abdominal pain. 10 minutes after administration of Labetalol, pt still with severe range blood pressure of 166/98 and pulse of 61 but feeling symptoms resolving. Nonetheless, Procardia 10 mg IR given at this time (12:45p). Pt concluded her course of Magnesium IV at 8am.     O:  166/98    P61    RR18   sP02 98% room air  gen: anxious appearing pt resting in bed. A+Ox3. NAD.  Card: Clear S1S2. RRR.  Pulm: CTA B/L. No w/r/r.  Abd: Soft, non-distended. Fundus firm.   VE: deferred  Ext: Normal LE bilaterally. No edema b/l. DP's 1+ b/l.    HELLP labs  WNL.     A/P: 28 y/o  s/p  / with re-admission through ED for severe postpartum pre-eclampsia who had been normotensive overnight, now with anxiety attack and severe range BP's.  -Awaiting repeat BP measurement.  -Will add Procardia 30 mg XL daily to current regimen of Labetalol 300 mg TID orally.  -Continue Xanax PRN.  -Restart Lexapro and avoid abrupt discontinuation of SSRI's.   -Will closely monitor.  d/w Dr. Charles and patient's family.  KASSY Clayton CHRIS ELENA   HD#1    S: Pt seen and examined for blurry vision, feeling like patient could not take a deep breath due to tightening and elevated BP of 154/80 manually and 164/90 electronically. Pt had just taken Xanax 1 mg orally due to anxiety but reported no relief of symptoms. She was due for labetalol 300mg at 2pm but will give it now. Pt denies scotoma, CP, palpitations, arm/hand tingling or numbness, n/v, abdominal pain. 10 minutes after administration of Labetalol, pt still with severe range blood pressure of 166/98 and pulse of 61 but feeling symptoms resolving. Nonetheless, Procardia 10 mg IR given at this time (12:45p). Pt concluded her course of Magnesium IV at 8am.     O:  166/98    P61    RR18   sP02 98% room air  gen: anxious appearing pt resting in bed. A+Ox3. NAD.  Card: Clear S1S2. RRR.  Pulm: CTA B/L. No w/r/r.  Abd: Soft, non-distended. Fundus firm and appropriately tender. No RUQ tenderness.  VE: deferred  Ext: Normal LE bilaterally. No edema b/l. DP's 1+ b/l.    HELLP labs  WNL.     A/P: 26 y/o  s/p  / with re-admission through ED for severe postpartum pre-eclampsia who had been normotensive overnight, now with anxiety attack and severe range BP's.  -Awaiting repeat BP measurement.  -Will add Procardia 30 mg XL daily to current regimen of Labetalol 300 mg TID orally.  -Continue Xanax PRN.  -Restart Lexapro and avoid abrupt discontinuation of SSRI's.   -Will closely monitor.  d/w Dr. Charles and patient's family.  KASSY Clayton CHRIS ELENA   HD#1    S: Pt seen and examined for blurry vision, feeling like patient could not take a deep breath due to tightening and elevated BP of 154/80 manually and 164/90 electronically. Pt had just taken Xanax 1 mg orally due to anxiety but reported no relief of symptoms. She was due for labetalol 300mg at 2pm but will give it now. Pt denies scotoma, CP, palpitations, arm/hand tingling or numbness, n/v, abdominal pain. 10 minutes after administration of Labetalol, pt still with severe range blood pressure of 166/98 and pulse of 61 but feeling symptoms resolving. Nonetheless, Procardia 10 mg IR given at this time (12:45p). Pt concluded her course of Magnesium IV at 8am.     O:  166/98    P61    RR18   sP02 98% room air  gen: anxious appearing pt resting in bed. A+Ox3. NAD.  Card: Clear S1S2. RRR.  Pulm: CTA B/L. No w/r/r.  Abd: Soft, non-distended. Fundus firm and appropriately tender. No RUQ tenderness.  VE: deferred  Ext: Normal LE bilaterally. No edema b/l. DP's 1+ b/l.    HELLP labs  WNL.     A/P: 28 y/o  s/p VAVD / with re-admission through ED for severe postpartum pre-eclampsia who had been normotensive overnight, now with anxiety attack and severe range BP's.  -Awaiting repeat BP measurement.  -Will add Procardia 30 mg XL daily to current regimen of Labetalol 300 mg TID orally.  -Continue Xanax PRN.  -Restart Lexapro and avoid abrupt discontinuation of SSRI's.   -Will closely monitor.  d/w Dr. Charles and patient's family.  KASSY Clayton

## 2017-12-06 NOTE — PROGRESS NOTE ADULT - PROBLEM SELECTOR PLAN 1
-Continue with magnesium for seizure prophylaxis  -Continue with labetalol, BP normal range  -Reg diet  -OOB  -PO analgesia prn  -Routine hospital care    Todd PGY3 -Continue with magnesium for seizure prophylaxis  -Continue with labetalol, BP normal range  -Send UA/UCx  -Reg diet  -OOB  -PO analgesia prn  -Routine hospital care    Todd PGY3

## 2017-12-06 NOTE — CHART NOTE - NSCHARTNOTEFT_GEN_A_CORE
S:  Called to assess pt for severe range BPs. /100, followed by 186/102. Pt is c/o blurry vision and SOB, like she cannot get a breath in, and chest discomfort. Denies HA, N/V, or abd pain.     Pt received hydralazine 5mg IV push @ bedside.    Vital Signs Last 24 Hrs  T(C): 36.4 (06 Dec 2017 17:44), Max: 36.8 (06 Dec 2017 11:50)  T(F): 97.5 (06 Dec 2017 17:44), Max: 98.2 (06 Dec 2017 11:50)  HR: 48 (06 Dec 2017 21:02) (44 - 86)  BP: 186/102 (06 Dec 2017 21:02) (109/73 - 190/100)  BP(mean): --  RR: 18 (06 Dec 2017 17:44) (16 - 20)  SpO2: 99% (06 Dec 2017 17:44) (97% - 99%)    Gen: NAD  CV: RRR  Lungs: mild crackles at bilateral bases  Abd: soft NT ND  Ext: reflexes 3+ bilaterally, mild edema    A/P: 28 yo  HD#1 a/w PP PEC s/p VAVD () 2/2 NRFHT w/ severe range BPs.  -s/p hydralazine 5mg IV push, will repeat BP 20 min after pushing  -CXR to r/o pulm edema  -repeat HELLP labs  -continue to monitor VS  -continue PO anti-HTN regimen  -s/p magnesium - for seizure ppx    d/w Dr. Melvin Infante MD PGY4

## 2017-12-06 NOTE — PROGRESS NOTE ADULT - SUBJECTIVE AND OBJECTIVE BOX
INTERVAL HPI/OVERNIGHT EVENTS: Pt seen and examined at bedside. No acute events overnight. Pain controlled. Patient has mild HA (has not taken any meds). No blurry vision, CP/SOB, RUQ pain, epigastric pain. OOB, voiding.      MEDICATIONS  (STANDING):  ALPRAZolam 0.5 milliGRAM(s) Oral once  diphtheria/tetanus/pertussis (acellular) Vaccine (ADAcel) 0.5 milliLiter(s) IntraMuscular once  labetalol 300 milliGRAM(s) Oral every 8 hours  lactated ringers. 1000 milliLiter(s) (50 mL/Hr) IV Continuous <Continuous>  magnesium sulfate Infusion 2 Gm/Hr (50 mL/Hr) IV Continuous <Continuous>  oxytocin Infusion 41.667 milliUNIT(s)/Min (125 mL/Hr) IV Continuous <Continuous>  prenatal multivitamin 1 Tablet(s) Oral daily  sodium chloride 0.9% lock flush 3 milliLiter(s) IV Push every 8 hours    MEDICATIONS  (PRN):  ALPRAZolam 1 milliGRAM(s) Oral three times a day PRN anxiety  dibucaine 1% Ointment 1 Application(s) Topical every 4 hours PRN Perineal Discomfort  diphenhydrAMINE   Capsule 25 milliGRAM(s) Oral every 6 hours PRN Itching  docusate sodium 100 milliGRAM(s) Oral two times a day PRN Stool Softening  glycerin Suppository - Adult 1 Suppository(s) Rectal at bedtime PRN Constipation  hydrocortisone 1% Cream 1 Application(s) Topical every 4 hours PRN Moderate to Severe Perineal Pain  lanolin Ointment 1 Application(s) Topical every 6 hours PRN Sore Nipples  magnesium hydroxide Suspension 30 milliLiter(s) Oral two times a day PRN Constipation  pramoxine 1%/zinc 5% Cream 1 Application(s) Topical every 4 hours PRN Moderate to Severe Perineal Pain  simethicone 80 milliGRAM(s) Chew every 6 hours PRN Gas  witch hazel Pads 1 Application(s) Topical every 4 hours PRN Perineal Discomfort      Allergies    No Known Allergies    Intolerances        12 point ROS negative except as outlined above    Vital Signs Last 24 Hrs  T(C): 36.6 (06 Dec 2017 04:06), Max: 37.6 (05 Dec 2017 10:55)  T(F): 97.9 (06 Dec 2017 04:06), Max: 99.7 (05 Dec 2017 10:55)  HR: 85 (06 Dec 2017 04:06) (53 - 110)  BP: 131/85 (06 Dec 2017 04:06) (114/77 - 188/106)  BP(mean): 99 (05 Dec 2017 14:45) (84 - 100)  RR: 18 (06 Dec 2017 04:06) (15 - 20)  SpO2: 99% (06 Dec 2017 04:06) (97% - 100%)    Last Menstrual Period      PHYSICAL EXAM:    GA: NAD, A+0 x 3  CV: RRR  Pulm: CTA BL  Abd: soft, nontender, nondistended, no rebound or guarding,   Extremities: no swelling or calf tenderness, reflexes +2 bilaterally          LABS:             13.4   11.1  )-----------( 198      ( 12-05 @ 11:42 )             38.7                13.4   7.6   )-----------( 196      ( 12-05 @ 10:08 )             40.2                12.5   8.9   )-----------( 149      ( 12-03 @ 13:38 )             35.1       12-05 @ 11:42    143  |  108  |  9   ----------------------------<  97  3.9   |  21  |  0.60    12-05 @ 10:08    138  |  106  |  11  ----------------------------<  91  4.2   |  24  |  0.74    12-03 @ 13:38    145  |  107  |  6   ----------------------------<  87  4.1   |  25  |  0.60      Mg     6.0     12-05 @ 23:20  Mg     4.4     12-05 @ 17:04  Mg     2.1     12-05 @ 10:08  Mg     7.2     12-04 @ 06:00  Mg     6.2     12-03 @ 23:54  Mg     4.7     12-03 @ 17:46    TPro  6.4  /  Alb  3.5  /  TBili  0.3  /  DBili  x   /  AST  44  /  ALT  46  /  AlkPhos  139  12-05 @ 11:42  TPro  7.3  /  Alb  3.0  /  TBili  0.4  /  DBili  x   /  AST  53  /  ALT  56  /  AlkPhos  163  12-05 @ 10:08  TPro  6.0  /  Alb  3.5  /  TBili  0.2  /  DBili  x   /  AST  17  /  ALT  12  /  AlkPhos  133  12-03 @ 13:38    PT/INR - ( 05 Dec 2017 11:42 )   PT: 10.7 sec;   INR: 0.99 ratio         PTT - ( 05 Dec 2017 11:42 )  PTT:31.1 sec      RADIOLOGY & ADDITIONAL TESTS: INTERVAL HPI/OVERNIGHT EVENTS: Pt seen and examined at bedside. No acute events overnight. Pain controlled. Patient has mild HA (has not taken any meds). No blurry vision, CP/SOB, RUQ pain, epigastric pain. OOB, voiding. Has some dysuria.       MEDICATIONS  (STANDING):  ALPRAZolam 0.5 milliGRAM(s) Oral once  diphtheria/tetanus/pertussis (acellular) Vaccine (ADAcel) 0.5 milliLiter(s) IntraMuscular once  labetalol 300 milliGRAM(s) Oral every 8 hours  lactated ringers. 1000 milliLiter(s) (50 mL/Hr) IV Continuous <Continuous>  magnesium sulfate Infusion 2 Gm/Hr (50 mL/Hr) IV Continuous <Continuous>  oxytocin Infusion 41.667 milliUNIT(s)/Min (125 mL/Hr) IV Continuous <Continuous>  prenatal multivitamin 1 Tablet(s) Oral daily  sodium chloride 0.9% lock flush 3 milliLiter(s) IV Push every 8 hours    MEDICATIONS  (PRN):  ALPRAZolam 1 milliGRAM(s) Oral three times a day PRN anxiety  dibucaine 1% Ointment 1 Application(s) Topical every 4 hours PRN Perineal Discomfort  diphenhydrAMINE   Capsule 25 milliGRAM(s) Oral every 6 hours PRN Itching  docusate sodium 100 milliGRAM(s) Oral two times a day PRN Stool Softening  glycerin Suppository - Adult 1 Suppository(s) Rectal at bedtime PRN Constipation  hydrocortisone 1% Cream 1 Application(s) Topical every 4 hours PRN Moderate to Severe Perineal Pain  lanolin Ointment 1 Application(s) Topical every 6 hours PRN Sore Nipples  magnesium hydroxide Suspension 30 milliLiter(s) Oral two times a day PRN Constipation  pramoxine 1%/zinc 5% Cream 1 Application(s) Topical every 4 hours PRN Moderate to Severe Perineal Pain  simethicone 80 milliGRAM(s) Chew every 6 hours PRN Gas  witch hazel Pads 1 Application(s) Topical every 4 hours PRN Perineal Discomfort      Allergies    No Known Allergies    Intolerances        12 point ROS negative except as outlined above    Vital Signs Last 24 Hrs  T(C): 36.6 (06 Dec 2017 04:06), Max: 37.6 (05 Dec 2017 10:55)  T(F): 97.9 (06 Dec 2017 04:06), Max: 99.7 (05 Dec 2017 10:55)  HR: 85 (06 Dec 2017 04:06) (53 - 110)  BP: 131/85 (06 Dec 2017 04:06) (114/77 - 188/106)  BP(mean): 99 (05 Dec 2017 14:45) (84 - 100)  RR: 18 (06 Dec 2017 04:06) (15 - 20)  SpO2: 99% (06 Dec 2017 04:06) (97% - 100%)    Last Menstrual Period      PHYSICAL EXAM:    GA: NAD, A+0 x 3  CV: RRR  Pulm: CTA BL  Abd: soft, nontender, nondistended, no rebound or guarding,   Extremities: no swelling or calf tenderness, reflexes +2 bilaterally          LABS:             13.4   11.1  )-----------( 198      ( 12-05 @ 11:42 )             38.7                13.4   7.6   )-----------( 196      ( 12-05 @ 10:08 )             40.2                12.5   8.9   )-----------( 149      ( 12-03 @ 13:38 )             35.1       12-05 @ 11:42    143  |  108  |  9   ----------------------------<  97  3.9   |  21  |  0.60    12-05 @ 10:08    138  |  106  |  11  ----------------------------<  91  4.2   |  24  |  0.74    12-03 @ 13:38    145  |  107  |  6   ----------------------------<  87  4.1   |  25  |  0.60      Mg     6.0     12-05 @ 23:20  Mg     4.4     12-05 @ 17:04  Mg     2.1     12-05 @ 10:08  Mg     7.2     12-04 @ 06:00  Mg     6.2     12-03 @ 23:54  Mg     4.7     12-03 @ 17:46    TPro  6.4  /  Alb  3.5  /  TBili  0.3  /  DBili  x   /  AST  44  /  ALT  46  /  AlkPhos  139  12-05 @ 11:42  TPro  7.3  /  Alb  3.0  /  TBili  0.4  /  DBili  x   /  AST  53  /  ALT  56  /  AlkPhos  163  12-05 @ 10:08  TPro  6.0  /  Alb  3.5  /  TBili  0.2  /  DBili  x   /  AST  17  /  ALT  12  /  AlkPhos  133  12-03 @ 13:38    PT/INR - ( 05 Dec 2017 11:42 )   PT: 10.7 sec;   INR: 0.99 ratio         PTT - ( 05 Dec 2017 11:42 )  PTT:31.1 sec      RADIOLOGY & ADDITIONAL TESTS:

## 2017-12-06 NOTE — PROGRESS NOTE ADULT - PROBLEM SELECTOR PLAN 1
-s/p magnesium for seizure prophylaxis  -s/p hydralazine 5, 10 IVP  -Continue with labetalol and add procardia  -Follow up final read CXR    Todd PGY3

## 2017-12-06 NOTE — PROGRESS NOTE ADULT - SUBJECTIVE AND OBJECTIVE BOX
INTERVAL HPI/OVERNIGHT EVENTS: Pt seen and examined at bedside.  Patient had increased BPs. Patient had CXR after complaining of SOB with deep inspiration. CXR appears to have no pulm edema or effusions, feeling better now. Blurry vision resolving after IV antihypertensives. Denies HA,  CP/SOB, RUQ pain, epigastric pain.     MEDICATIONS  (STANDING):  acetaminophen   Tablet. 975 milliGRAM(s) Oral every 6 hours  ALPRAZolam 0.5 milliGRAM(s) Oral once  diphtheria/tetanus/pertussis (acellular) Vaccine (ADAcel) 0.5 milliLiter(s) IntraMuscular once  escitalopram 20 milliGRAM(s) Oral daily  labetalol 300 milliGRAM(s) Oral every 8 hours  lactated ringers. 1000 milliLiter(s) (50 mL/Hr) IV Continuous <Continuous>  NIFEdipine XL 30 milliGRAM(s) Oral at bedtime  nitrofurantoin (MACROBID) 100 milliGRAM(s) Oral two times a day with meals  oxytocin Infusion 41.667 milliUNIT(s)/Min (125 mL/Hr) IV Continuous <Continuous>  prenatal multivitamin 1 Tablet(s) Oral daily  sodium chloride 0.9% lock flush 3 milliLiter(s) IV Push every 8 hours    MEDICATIONS  (PRN):  ALPRAZolam 1 milliGRAM(s) Oral three times a day PRN anxiety  dibucaine 1% Ointment 1 Application(s) Topical every 4 hours PRN Perineal Discomfort  diphenhydrAMINE   Capsule 25 milliGRAM(s) Oral every 6 hours PRN Itching  docusate sodium 100 milliGRAM(s) Oral two times a day PRN Stool Softening  glycerin Suppository - Adult 1 Suppository(s) Rectal at bedtime PRN Constipation  hydrocortisone 1% Cream 1 Application(s) Topical every 4 hours PRN Moderate to Severe Perineal Pain  lanolin Ointment 1 Application(s) Topical every 6 hours PRN Sore Nipples  magnesium hydroxide Suspension 30 milliLiter(s) Oral two times a day PRN Constipation  pramoxine 1%/zinc 5% Cream 1 Application(s) Topical every 4 hours PRN Moderate to Severe Perineal Pain  simethicone 80 milliGRAM(s) Chew every 6 hours PRN Gas  witch hazel Pads 1 Application(s) Topical every 4 hours PRN Perineal Discomfort      Allergies    No Known Allergies    Intolerances        12 point ROS negative except as outlined above    Vital Signs Last 24 Hrs  T(C): 36.4 (06 Dec 2017 17:44), Max: 36.8 (06 Dec 2017 11:50)  T(F): 97.5 (06 Dec 2017 17:44), Max: 98.2 (06 Dec 2017 11:50)  HR: 79 (06 Dec 2017 21:58) (44 - 85)  BP: 150/92 (06 Dec 2017 21:58) (109/73 - 190/100)  BP(mean): --  RR: 18 (06 Dec 2017 21:58) (16 - 20)  SpO2: 96% (06 Dec 2017 21:58) (96% - 99%)    Last Menstrual Period      PHYSICAL EXAM:    GA: NAD, A+0 x 3  CV: RRR  Pulm: CTA BL, decreased breath sounds at bases  Breasts: soft, nontender, no palpable masses  Abd: soft, nontender, nondistended, no rebound or guarding,   Extremities: trace edema, no calf tenderness, reflexes +2 bilaterally          LABS:             13.4   11.1  )-----------( 198      (  @ 11:42 )             38.7                13.4   7.6   )-----------( 196      (  @ 10:08 )             40.2        @ 11:42    143  |  108  |  9   ----------------------------<  97  3.9   |  21  |  0.60     @ 10:08    138  |  106  |  11  ----------------------------<  91  4.2   |  24  |  0.74      Mg     6.6      @ 05:38  Mg     6.0      @ 23:20  Mg     4.4      @ 17:04  Mg     2.1      @ 10:08  Mg     7.2      @ 06:00  Mg     6.2      @ 23:54    TPro  6.4  /  Alb  3.5  /  TBili  0.3  /  DBili  x   /  AST  44  /  ALT  46  /  AlkPhos  139  12-05 @ 11:42  TPro  7.3  /  Alb  3.0  /  TBili  0.4  /  DBili  x   /  AST  53  /  ALT  56  /  AlkPhos  163  12-05 @ 10:08    PT/INR - ( 05 Dec 2017 11:42 )   PT: 10.7 sec;   INR: 0.99 ratio         PTT - ( 05 Dec 2017 11:42 )  PTT:31.1 sec  Urinalysis Basic - ( 06 Dec 2017 05:38 )    Color: Colorless / Appearance: SL Turbid / S.006 / pH: x  Gluc: x / Ketone: Negative  / Bili: Negative / Urobili: Negative   Blood: x / Protein: Trace / Nitrite: Positive   Leuk Esterase: Large / RBC: 10-25 /HPF / WBC >50 /HPF   Sq Epi: x / Non Sq Epi: OCC /HPF / Bacteria: Few /HPF        RADIOLOGY & ADDITIONAL TESTS:

## 2017-12-07 LAB
ALBUMIN SERPL ELPH-MCNC: 3.6 G/DL — SIGNIFICANT CHANGE UP (ref 3.3–5)
ALP SERPL-CCNC: 128 U/L — HIGH (ref 40–120)
ALT FLD-CCNC: 60 U/L RC — HIGH (ref 10–45)
ANION GAP SERPL CALC-SCNC: 13 MMOL/L — SIGNIFICANT CHANGE UP (ref 5–17)
APTT BLD: 30.2 SEC — SIGNIFICANT CHANGE UP (ref 27.5–37.4)
AST SERPL-CCNC: 41 U/L — HIGH (ref 10–40)
BASOPHILS # BLD AUTO: 0 K/UL — SIGNIFICANT CHANGE UP (ref 0–0.2)
BASOPHILS NFR BLD AUTO: 0.2 % — SIGNIFICANT CHANGE UP (ref 0–2)
BILIRUB SERPL-MCNC: 0.3 MG/DL — SIGNIFICANT CHANGE UP (ref 0.2–1.2)
BUN SERPL-MCNC: 9 MG/DL — SIGNIFICANT CHANGE UP (ref 7–23)
CALCIUM SERPL-MCNC: 9.4 MG/DL — SIGNIFICANT CHANGE UP (ref 8.4–10.5)
CHLORIDE SERPL-SCNC: 104 MMOL/L — SIGNIFICANT CHANGE UP (ref 96–108)
CO2 SERPL-SCNC: 21 MMOL/L — LOW (ref 22–31)
CREAT SERPL-MCNC: 0.62 MG/DL — SIGNIFICANT CHANGE UP (ref 0.5–1.3)
EOSINOPHIL # BLD AUTO: 0.1 K/UL — SIGNIFICANT CHANGE UP (ref 0–0.5)
EOSINOPHIL NFR BLD AUTO: 1.8 % — SIGNIFICANT CHANGE UP (ref 0–6)
FIBRINOGEN PPP-MCNC: 1022 MG/DL — HIGH (ref 310–510)
GLUCOSE SERPL-MCNC: 118 MG/DL — HIGH (ref 70–99)
HCT VFR BLD CALC: 37.6 % — SIGNIFICANT CHANGE UP (ref 34.5–45)
HGB BLD-MCNC: 12.9 G/DL — SIGNIFICANT CHANGE UP (ref 11.5–15.5)
INR BLD: 1.07 RATIO — SIGNIFICANT CHANGE UP (ref 0.88–1.16)
LDH SERPL L TO P-CCNC: 443 U/L — HIGH (ref 50–242)
LYMPHOCYTES # BLD AUTO: 1 K/UL — SIGNIFICANT CHANGE UP (ref 1–3.3)
LYMPHOCYTES # BLD AUTO: 12.5 % — LOW (ref 13–44)
MCHC RBC-ENTMCNC: 31 PG — SIGNIFICANT CHANGE UP (ref 27–34)
MCHC RBC-ENTMCNC: 34.2 GM/DL — SIGNIFICANT CHANGE UP (ref 32–36)
MCV RBC AUTO: 90.6 FL — SIGNIFICANT CHANGE UP (ref 80–100)
MONOCYTES # BLD AUTO: 0.5 K/UL — SIGNIFICANT CHANGE UP (ref 0–0.9)
MONOCYTES NFR BLD AUTO: 6.5 % — SIGNIFICANT CHANGE UP (ref 2–14)
NEUTROPHILS # BLD AUTO: 6.2 K/UL — SIGNIFICANT CHANGE UP (ref 1.8–7.4)
NEUTROPHILS NFR BLD AUTO: 79 % — HIGH (ref 43–77)
PLATELET # BLD AUTO: 232 K/UL — SIGNIFICANT CHANGE UP (ref 150–400)
POTASSIUM SERPL-MCNC: 4.3 MMOL/L — SIGNIFICANT CHANGE UP (ref 3.5–5.3)
POTASSIUM SERPL-SCNC: 4.3 MMOL/L — SIGNIFICANT CHANGE UP (ref 3.5–5.3)
PROT SERPL-MCNC: 6.6 G/DL — SIGNIFICANT CHANGE UP (ref 6–8.3)
PROTHROM AB SERPL-ACNC: 11.6 SEC — SIGNIFICANT CHANGE UP (ref 9.8–12.7)
RBC # BLD: 4.15 M/UL — SIGNIFICANT CHANGE UP (ref 3.8–5.2)
RBC # FLD: 11.3 % — SIGNIFICANT CHANGE UP (ref 10.3–14.5)
SODIUM SERPL-SCNC: 138 MMOL/L — SIGNIFICANT CHANGE UP (ref 135–145)
URATE SERPL-MCNC: 3.8 MG/DL — SIGNIFICANT CHANGE UP (ref 2.5–7)
WBC # BLD: 7.9 K/UL — SIGNIFICANT CHANGE UP (ref 3.8–10.5)
WBC # FLD AUTO: 7.9 K/UL — SIGNIFICANT CHANGE UP (ref 3.8–10.5)

## 2017-12-07 PROCEDURE — 99254 IP/OBS CNSLTJ NEW/EST MOD 60: CPT | Mod: GC

## 2017-12-07 RX ORDER — ONDANSETRON 8 MG/1
4 TABLET, FILM COATED ORAL ONCE
Qty: 0 | Refills: 0 | Status: COMPLETED | OUTPATIENT
Start: 2017-12-07 | End: 2017-12-07

## 2017-12-07 RX ORDER — ESCITALOPRAM OXALATE 10 MG/1
10 TABLET, FILM COATED ORAL DAILY
Qty: 0 | Refills: 0 | Status: DISCONTINUED | OUTPATIENT
Start: 2017-12-07 | End: 2017-12-08

## 2017-12-07 RX ADMIN — Medication 1 MILLIGRAM(S): at 10:00

## 2017-12-07 RX ADMIN — Medication 300 MILLIGRAM(S): at 06:35

## 2017-12-07 RX ADMIN — Medication 100 MILLIGRAM(S): at 11:23

## 2017-12-07 RX ADMIN — Medication 975 MILLIGRAM(S): at 06:35

## 2017-12-07 RX ADMIN — SODIUM CHLORIDE 3 MILLILITER(S): 9 INJECTION INTRAMUSCULAR; INTRAVENOUS; SUBCUTANEOUS at 21:50

## 2017-12-07 RX ADMIN — ONDANSETRON 4 MILLIGRAM(S): 8 TABLET, FILM COATED ORAL at 21:55

## 2017-12-07 RX ADMIN — SODIUM CHLORIDE 3 MILLILITER(S): 9 INJECTION INTRAMUSCULAR; INTRAVENOUS; SUBCUTANEOUS at 00:23

## 2017-12-07 RX ADMIN — ESCITALOPRAM OXALATE 10 MILLIGRAM(S): 10 TABLET, FILM COATED ORAL at 23:49

## 2017-12-07 RX ADMIN — Medication 300 MILLIGRAM(S): at 22:54

## 2017-12-07 RX ADMIN — Medication 30 MILLIGRAM(S): at 22:54

## 2017-12-07 RX ADMIN — SODIUM CHLORIDE 3 MILLILITER(S): 9 INJECTION INTRAMUSCULAR; INTRAVENOUS; SUBCUTANEOUS at 13:59

## 2017-12-07 RX ADMIN — SODIUM CHLORIDE 3 MILLILITER(S): 9 INJECTION INTRAMUSCULAR; INTRAVENOUS; SUBCUTANEOUS at 06:37

## 2017-12-07 RX ADMIN — Medication 1 MILLIGRAM(S): at 23:02

## 2017-12-07 RX ADMIN — Medication 100 MILLIGRAM(S): at 17:58

## 2017-12-07 RX ADMIN — Medication 300 MILLIGRAM(S): at 14:06

## 2017-12-07 NOTE — PROVIDER CONTACT NOTE (CHANGE IN STATUS NOTIFICATION) - ACTION/TREATMENT ORDERED:
hold Labetalol for now and retake vitals in 15minutes
no further action is needed at this time
OK to Give the 300mg of Labetalol now but hold Procardia till 2300 and retake BP in 30 minutes
retake BP in 30minutes when procardia is due
will be over to access pt and have 10mg of Hydralazine available to IVP to the pt
will be over to access pt and have 5mg of Hydralazine available to IVP to the pt

## 2017-12-07 NOTE — PROVIDER CONTACT NOTE (CHANGE IN STATUS NOTIFICATION) - BACKGROUND
pt is a post partum readmit s/p  from  s/p Mg on Labetalol 300 TID last dose was giving at 1230 along with procardia 10mg. pt has Hx of anxiety and on Xanax 1.0 PRN TID

## 2017-12-07 NOTE — PROGRESS NOTE ADULT - SUBJECTIVE AND OBJECTIVE BOX
OB Attending Note      S: Pt feeling well, no PIH sx, anxiety improved      Physical exam:    Vital Signs Last 24 Hrs  T(C): 37 (07 Dec 2017 06:31), Max: 37 (07 Dec 2017 00:59)  T(F): 98.6 (07 Dec 2017 06:31), Max: 98.6 (07 Dec 2017 00:59)  HR: 84 (07 Dec 2017 08:17) (44 - 94)  BP: 130/80 (07 Dec 2017 08:17) (109/73 - 190/100)        Gen: NAD  S, fundus firm  Hyperflexia throughout    LABS:                        12.9   7.9   )-----------( 232      ( 07 Dec 2017 07:09 )             37.6                         13.4   11.1  )-----------( 198      ( 05 Dec 2017 11:42 )             38.7       12-07-17 @ 07:09      138  |  104  |  9   ----------------------------<  118<H>  4.3   |  21<L>  |  0.62    12-05-17 @ 11:42      143  |  108  |  9   ----------------------------<  97  3.9   |  21<L>  |  0.60    12-05-17 @ 10:08      138  |  106  |  11  ----------------------------<  91  4.2   |  24  |  0.74        Ca    9.4      07 Dec 2017 07:09  Ca    9.2      05 Dec 2017 11:42  Ca    8.8      05 Dec 2017 10:08  Mg     6.6<H>     12-06  Mg     6.0<H>     12-05  Mg     4.4<H>     12-05  Mg     2.1     12-05    TPro  6.6  /  Alb  3.6  /  TBili  0.3  /  DBili  x   /  AST  41<H>  /  ALT  60<H>  /  AlkPhos  128<H>  12-07-17 @ 07:09  TPro  6.4  /  Alb  3.5  /  TBili  0.3  /  DBili  x   /  AST  44<H>  /  ALT  46<H>  /  AlkPhos  139<H>  12-05-17 @ 11:42  TPro  7.3  /  Alb  3.0<L>  /  TBili  0.4  /  DBili  x   /  AST  53<H>  /  ALT  56  /  AlkPhos  163<H>  12-05-17 @ 10:08              Assessment and Plan  HD # 2 - readmitted for severe PEC  pt without sx at this time  had severe range bp - now mild, labetolol 300 tid, added procardia 30  cards consult  LFTs with mild elevation - cont to monitor for now

## 2017-12-07 NOTE — PROGRESS NOTE ADULT - PROBLEM SELECTOR PLAN 1
-s/p magnesium for seizure prophylaxis  -Continue with labetalol and  procardia  -Follow up final read CXR  -Reg diet  -OOB  -Routine inpatient care      Todd PGY3.

## 2017-12-07 NOTE — PROVIDER CONTACT NOTE (CHANGE IN STATUS NOTIFICATION) - SITUATION
pt now has a BP of 137/80 HR 94
pt still has a BP of 150/92 HR 79 after 10minutes IVP of 10mg of Hydralazine
pt still has a elevated /102 HR 48
pt still has a elevated /104 HR 56 s/p 15min after IVP 5mg of Hydralazine
pt has a elevated  electronic /110 HR 44 and manual /100 HR 52 and Labetalol is due now
pt now has a BP of 130/81 HR 85

## 2017-12-07 NOTE — PROVIDER CONTACT NOTE (CHANGE IN STATUS NOTIFICATION) - ASSESSMENT
pt denies any SOB, chest pain, blurry vison or headache at this time
pt denies any SOB, chest pain, blurry vison or headache at this time
pt states that she is having so slight blurry vision, SOB but denies headache.
pt denies any SOB, chest pain, blurry vison or headache at this time

## 2017-12-07 NOTE — PROVIDER CONTACT NOTE (CHANGE IN STATUS NOTIFICATION) - RECOMMENDATIONS
made pt aware of s/s of elevated BP and to notify nurse if any s/s occur or get worse. requested resident to come to pt's bedside to assess pt

## 2017-12-07 NOTE — CONSULT NOTE ADULT - NSHPATTENDINGPLANDISCUSS_GEN_ALL_CORE
Patient seen and examined; discussed with cardiology fellow. Hx., exam and labs as above.  I agree with the assessment and recommendations.   Pilo Ortega M.D.  Cardiology Consult Service  [phone] or [phone]

## 2017-12-07 NOTE — CONSULT NOTE ADULT - SUBJECTIVE AND OBJECTIVE BOX
CHIEF COMPLAINT:  Blurry vision    HISTORY OF PRESENT ILLNESS:  28yo female s/p vaginal delivery 12/1 c/b postpartum preeclampsia presented on 12/5 with blurry vision and was subsequently found to have hypertensive emergency. After successful vaginal delivery, she was found to have a blood pressure of 163/108. At the time, she was asymptomatic and BP decreased to 140/80 without medications. She was discharged in stable condition, off of any blood pressure medication, on 12/4. The next day, she awoke with blurry vision and headache and presented to OSH ED. In the OSH, she was found to have a blood pressure of 200/100. She was given IV magnesium and transferred to [facility] for further management. At [facility], she was started on labetalol 200mg TID, but BP remained elevated to 150-90. Her labetalol was increased to 300mg TID, and she was started on nifedipine 30mg PO QHS. Her blood pressure is well controlled now, and she no longer complains of headache, blurry vision. She is laying comfortably in bed. Of note ,she does not wish to breast feed    Allergies    No Known Allergies    Intolerances    	    MEDICATIONS:  labetalol 300 milliGRAM(s) Oral every 8 hours  NIFEdipine XL 30 milliGRAM(s) Oral at bedtime    nitrofurantoin (MACROBID) 100 milliGRAM(s) Oral two times a day with meals      acetaminophen   Tablet. 975 milliGRAM(s) Oral every 6 hours  ALPRAZolam 0.5 milliGRAM(s) Oral once  ALPRAZolam 1 milliGRAM(s) Oral three times a day PRN  diphenhydrAMINE   Capsule 25 milliGRAM(s) Oral every 6 hours PRN  diphenhydrAMINE   Capsule 25 milliGRAM(s) Oral once  escitalopram 20 milliGRAM(s) Oral daily    docusate sodium 100 milliGRAM(s) Oral two times a day PRN  glycerin Suppository - Adult 1 Suppository(s) Rectal at bedtime PRN  magnesium hydroxide Suspension 30 milliLiter(s) Oral two times a day PRN  simethicone 80 milliGRAM(s) Chew every 6 hours PRN      dibucaine 1% Ointment 1 Application(s) Topical every 4 hours PRN  diphtheria/tetanus/pertussis (acellular) Vaccine (ADAcel) 0.5 milliLiter(s) IntraMuscular once  hydrocortisone 1% Cream 1 Application(s) Topical every 4 hours PRN  lactated ringers. 1000 milliLiter(s) IV Continuous <Continuous>  lanolin Ointment 1 Application(s) Topical every 6 hours PRN  oxytocin Infusion 41.667 milliUNIT(s)/Min IV Continuous <Continuous>  pramoxine 1%/zinc 5% Cream 1 Application(s) Topical every 4 hours PRN  prenatal multivitamin 1 Tablet(s) Oral daily  witch hazel Pads 1 Application(s) Topical every 4 hours PRN      PAST MEDICAL & SURGICAL HISTORY:  No pertinent past medical history  H/O breast augmentation      FAMILY HISTORY:      SOCIAL HISTORY:    [x ] Non-smoker  [ ] Smoker  [ ] Alcohol      REVIEW OF SYSTEMS:  CONSTITUTIONAL: no fevers or chills  EYES/ENT: No visual changes; No vertigo or throat pain  NECK: No freddie or stiffness  RESPIRATORY: No cough, wheezing  CARDIOVASCULAR: No chest pain or palpitations  GASTROINTESTINAL: No abdominal or epigastric pain. No nausea/vomiting/melena  Genitourinary: No dysuria, frequency or hematuria  NEUROLOGICAL: No numbness or weakness  SKIN: No itching, burning, rashes or lesions  All other review of systems is negative unless indicated above    PHYSICAL EXAM:  T(C): 36.7 (12-07-17 @ 12:45), Max: 37 (12-07-17 @ 00:59)  HR: 67 (12-07-17 @ 12:45) (44 - 94)  BP: 138/76 (12-07-17 @ 12:45) (109/73 - 186/102)  RR: 18 (12-07-17 @ 12:45) (18 - 18)  SpO2: 99% (12-07-17 @ 12:45) (96% - 99%)  Wt(kg): --  I&O's Summary      Appearance: Normal	  HEENT:   Normal oral mucosa, PERRL, EOMI	  Lymphatic: No lymphadenopathy  Cardiovascular: Normal S1 S2, No JVD, No murmurs, No edema  Respiratory: Lungs clear to auscultation	  Psychiatry: A & O x 3, Mood & affect appropriate  Gastrointestinal:  Soft, Non-tender, + BS	  Skin: No rashes, No ecchymoses, No cyanosis	  Neurologic: Non-focal  Extremities: Normal range of motion, No clubbing, cyanosis or edema  Vascular: Peripheral pulses palpable 2+ bilaterally        LABS:	 	    CBC Full  -  ( 07 Dec 2017 07:09 )  WBC Count : 7.9 K/uL  Hemoglobin : 12.9 g/dL  Hematocrit : 37.6 %  Platelet Count - Automated : 232 K/uL  Mean Cell Volume : 90.6 fl  Mean Cell Hemoglobin : 31.0 pg  Mean Cell Hemoglobin Concentration : 34.2 gm/dL  Auto Neutrophil # : 6.2 K/uL  Auto Lymphocyte # : 1.0 K/uL  Auto Monocyte # : 0.5 K/uL  Auto Eosinophil # : 0.1 K/uL  Auto Basophil # : 0.0 K/uL  Auto Neutrophil % : 79.0 %  Auto Lymphocyte % : 12.5 %  Auto Monocyte % : 6.5 %  Auto Eosinophil % : 1.8 %  Auto Basophil % : 0.2 %    12-07    138  |  104  |  9   ----------------------------<  118<H>  4.3   |  21<L>  |  0.62    Ca    9.4      07 Dec 2017 07:09  Mg     6.6     12-06  Mg     6.0     12-05    TPro  6.6  /  Alb  3.6  /  TBili  0.3  /  DBili  x   /  AST  41<H>  /  ALT  60<H>  /  AlkPhos  128<H>  12-07      TELEMETRY: 	  NSR  ECG:  	NSR  	    PREVIOUS DIAGNOSTIC TESTING:    No echo/stress/cath in our system  	  ASSESSMENT/PLAN: 	  28yo female with preeclampsia p/w hypertensive emergency-now resolved    1. Hypertensive emergency  -Pt now asymptomatic with well controlled blood pressures. I spoke to the patient and her family for 30 minutes regarding choice of blood pressure agent. I explained the risks and benefits of thiazide diuretics calcium channel blockers and beta blockers. In the end, we decided that continuing the current medications is most appropriate. Firstly, she is well controlled on her current regiment. She and her family are willing to take a three times daily medication. Moreover, in this preeclamptic patient, the blood pressure is expected to normalize in the next three months. A three times daily medication will be easier to titrate as an outpatient. Should follow-up with a cardiologist within 2 weeks of discharge.    Kenneth Malone [id number] :  26 yo F s/p vaginal delivery 12/1.  Discharged on 12/4, but returned on 12/5 with blurry vision, was subsequently found to have elveated BP.    After vaginal delivery, she was found to have a blood pressure of 163/108. At the time, she was asymptomatic and BP decreased to 140/80 without medications.   She was discharged in stable condition, without blood pressure medication, on 12/4. The next day, she awoke with blurry vision and headache and presented to ED at [place]. She was found to have a blood pressure of 200/100. She was given IV magnesium and transferred to [facility] for further management.   At [facility], she was started on labetalol 200mg TID, but BP remained elevated to 150-90. Her labetalol was increased to 300mg TID, and she was started on nifedipine 30mg PO QHS. Her blood pressure is better controlled now, and she no longer complains of headache, blurry vision. She is laying comfortably in bed. Of note ,she does not wish to breast feed    Allergies:  No Known Allergies    MEDICATIONS:  labetalol 300 milliGRAM(s) Oral every 8 hours  NIFEdipine XL 30 milliGRAM(s) Oral at bedtime  nitrofurantoin (MACROBID) 100 milliGRAM(s) Oral two times a day with meals  acetaminophen   Tablet. 975 milliGRAM(s) Oral every 6 hours  ALPRAZolam 0.5 milliGRAM(s) Oral once  ALPRAZolam 1 milliGRAM(s) Oral three times a day PRN  diphenhydrAMINE   Capsule 25 milliGRAM(s) Oral every 6 hours PRN  diphenhydrAMINE   Capsule 25 milliGRAM(s) Oral once  escitalopram 20 milliGRAM(s) Oral daily  docusate sodium 100 milliGRAM(s) Oral two times a day PRN  glycerin Suppository - Adult 1 Suppository(s) Rectal at bedtime PRN  magnesium hydroxide Suspension 30 milliLiter(s) Oral two times a day PRN  simethicone 80 milliGRAM(s) Chew every 6 hours PRN  dibucaine 1% Ointment 1 Application(s) Topical every 4 hours PRN  diphtheria/tetanus/pertussis (acellular) Vaccine (ADAcel) 0.5 milliLiter(s) IntraMuscular once  hydrocortisone 1% Cream 1 Application(s) Topical every 4 hours PRN  lactated ringers. 1000 milliLiter(s) IV Continuous <Continuous>  lanolin Ointment 1 Application(s) Topical every 6 hours PRN  oxytocin Infusion 41.667 milliUNIT(s)/Min IV Continuous <Continuous>  pramoxine 1%/zinc 5% Cream 1 Application(s) Topical every 4 hours PRN  prenatal multivitamin 1 Tablet(s) Oral daily  witch hazel Pads 1 Application(s) Topical every 4 hours PRN    PAST MEDICAL & SURGICAL HISTORY:  No pertinent past medical history  H/O breast augmentation    FAMILY HISTORY:  No heart disease in family members.    SOCIAL HISTORY:   Non-smoker, no alcohol    REVIEW OF SYSTEMS:  CONSTITUTIONAL: no fevers or chills  EYES/ENT: No visual changes; No vertigo or throat pain  NECK: No freddie or stiffness  RESPIRATORY: No cough, wheezing  CARDIOVASCULAR: No chest pain or palpitations  GASTROINTESTINAL: No abdominal or epigastric pain. No nausea/vomiting/melena  Genitourinary: No dysuria, frequency or hematuria  NEUROLOGICAL: No numbness or weakness  SKIN: No itching, burning, rashes or lesions  All other review of systems is negative unless indicated above    PHYSICAL EXAM:  T(C): 36.7 (12-07-17 @ 12:45), Max: 37 (12-07-17 @ 00:59)  HR: 67 (12-07-17 @ 12:45) (44 - 94)  BP: 138/76 (12-07-17 @ 12:45) (109/73 - 186/102)  RR: 18 (12-07-17 @ 12:45) (18 - 18)  SpO2: 99% (12-07-17 @ 12:45) (96% - 99%)    Appearance: Normal	  HEENT:   Normal oral mucosa, PERRL, EOMI	  Lymphatic: No lymphadenopathy  Cardiovascular: Normal S1 S2, No JVD, No murmurs, No edema  Respiratory: Lungs clear to auscultation	  Psychiatry: A & O x 3, Mood & affect appropriate  Gastrointestinal:  Soft, Non-tender, + BS	  Skin: No rashes, No ecchymoses, No cyanosis	  Neurologic: Non-focal  Extremities: Normal range of motion, No clubbing, cyanosis or edema  Vascular: Peripheral pulses palpable 2+ bilaterally    12 Lead ECG (12.05.17 @ 10:59)  NSR at 100 bpm.  Normal intervals and axis; normal tracing.     LABS:	 	    CBC Full  -  ( 07 Dec 2017 07:09 )  WBC Count : 7.9 K/uL  Hemoglobin : 12.9 g/dL  Hematocrit : 37.6 %  Platelet Count - Automated : 232 K/uL  Mean Cell Volume : 90.6 fl  Mean Cell Hemoglobin : 31.0 pg  Mean Cell Hemoglobin Concentration : 34.2 gm/dL  Auto Neutrophil # : 6.2 K/uL  Auto Lymphocyte # : 1.0 K/uL  Auto Monocyte # : 0.5 K/uL  Auto Eosinophil # : 0.1 K/uL  Auto Basophil # : 0.0 K/uL  Auto Neutrophil % : 79.0 %  Auto Lymphocyte % : 12.5 %  Auto Monocyte % : 6.5 %  Auto Eosinophil % : 1.8 %  Auto Basophil % : 0.2 %    12-07    138  |  104  |  9   ----------------------------<  118<H>  4.3   |  21<L>  |  0.62    Ca    9.4      07 Dec 2017 07:09  Mg     6.6     12-06  Mg     6.0     12-05    TPro  6.6  /  Alb  3.6  /  TBili  0.3  /  DBili  x   /  AST  41<H>  /  ALT  60<H>  /  AlkPhos  128<H>  12-07    PREVIOUS DIAGNOSTIC TESTING:   No echo/stress/cath in our system  	  ASSESSMENT/PLAN: 	  28yo female with pregnancy induced HTN, p/w hypertensive emergency-now resolved    1. Hypertensive emergency  -Pt now asymptomatic, with well controlled blood pressures. I spoke to the patient and her family for 30 minutes regarding choice of blood pressure agent. I explained the risks and benefits of thiazide diuretics, calcium channel blockers and beta blockers. In the end, we decided that continuing the current medications is most appropriate. Firstly, she is well controlled on her current regimen. She and her family are willing to take a three times daily medication. Moreover, in this preeclamptic patient, the blood pressure is expected to normalize in the next three months. A three times daily, labetalol medication will be easier to titrate as an outpatient.   Should follow-up with a cardiologist within 2 weeks of discharge.  Check TTE.    Kenneth Malone M.D.  [id number]    Pilo Ortega M.D.  Cardiology Consult Service  [phone] or [phone]

## 2017-12-07 NOTE — PROGRESS NOTE ADULT - SUBJECTIVE AND OBJECTIVE BOX
INTERVAL HPI/OVERNIGHT EVENTS: Pt seen and examined at bedside.  Overnight patient received IV hydralazine 5, 10 for severe range BPs which resolved. Patient feels much better this morning. No HA, blurry vision, CP/SOB, RUQ pain, epigastric pain. OOB, tolerating PO, voiding. No fevers, chills, nausea, emesis.     MEDICATIONS  (STANDING):  acetaminophen   Tablet. 975 milliGRAM(s) Oral every 6 hours  ALPRAZolam 0.5 milliGRAM(s) Oral once  diphenhydrAMINE   Capsule 25 milliGRAM(s) Oral once  diphtheria/tetanus/pertussis (acellular) Vaccine (ADAcel) 0.5 milliLiter(s) IntraMuscular once  escitalopram 20 milliGRAM(s) Oral daily  labetalol 300 milliGRAM(s) Oral every 8 hours  lactated ringers. 1000 milliLiter(s) (50 mL/Hr) IV Continuous <Continuous>  NIFEdipine XL 30 milliGRAM(s) Oral at bedtime  nitrofurantoin (MACROBID) 100 milliGRAM(s) Oral two times a day with meals  oxytocin Infusion 41.667 milliUNIT(s)/Min (125 mL/Hr) IV Continuous <Continuous>  prenatal multivitamin 1 Tablet(s) Oral daily  sodium chloride 0.9% lock flush 3 milliLiter(s) IV Push every 8 hours    MEDICATIONS  (PRN):  ALPRAZolam 1 milliGRAM(s) Oral three times a day PRN anxiety  dibucaine 1% Ointment 1 Application(s) Topical every 4 hours PRN Perineal Discomfort  diphenhydrAMINE   Capsule 25 milliGRAM(s) Oral every 6 hours PRN Itching  docusate sodium 100 milliGRAM(s) Oral two times a day PRN Stool Softening  glycerin Suppository - Adult 1 Suppository(s) Rectal at bedtime PRN Constipation  hydrocortisone 1% Cream 1 Application(s) Topical every 4 hours PRN Moderate to Severe Perineal Pain  lanolin Ointment 1 Application(s) Topical every 6 hours PRN Sore Nipples  magnesium hydroxide Suspension 30 milliLiter(s) Oral two times a day PRN Constipation  pramoxine 1%/zinc 5% Cream 1 Application(s) Topical every 4 hours PRN Moderate to Severe Perineal Pain  simethicone 80 milliGRAM(s) Chew every 6 hours PRN Gas  witch hazel Pads 1 Application(s) Topical every 4 hours PRN Perineal Discomfort      Allergies    No Known Allergies    Intolerances        12 point ROS negative except as outlined above    Vital Signs Last 24 Hrs  T(C): 37 (07 Dec 2017 00:59), Max: 37 (07 Dec 2017 00:59)  T(F): 98.6 (07 Dec 2017 00:59), Max: 98.6 (07 Dec 2017 00:59)  HR: 81 (07 Dec 2017 00:59) (44 - 94)  BP: 135/85 (07 Dec 2017 00:59) (109/73 - 190/100)  BP(mean): --  RR: 18 (07 Dec 2017 00:59) (16 - 20)  SpO2: 97% (07 Dec 2017 00:59) (96% - 99%)      PHYSICAL EXAM:    GA: NAD, A+0 x 3  CV: RRR  Pulm: CTA BL  Abd: soft, nontender, nondistended, no rebound or guarding,   Extremities: no swelling or calf tenderness, reflexes +2 bilaterally          LABS:             13.4   11.1  )-----------( 198      (  @ 11:42 )             38.7                13.4   7.6   )-----------( 196      (  @ 10:08 )             40.2        11:42    143  |  108  |  9   ----------------------------<  97  3.9   |  21  |  0.60     @ 10:08    138  |  106  |  11  ----------------------------<  91  4.2   |  24  |  0.74      Mg     6.6      05:38  Mg     6.0      @ 23:20  Mg     4.4      @ 17:04  Mg     2.1      10:08  Mg     7.2      @ 06:00    TPro  6.4  /  Alb  3.5  /  TBili  0.3  /  DBili  x   /  AST  44  /  ALT  46  /  AlkPhos  139   11:42  TPro  7.3  /  Alb  3.0  /  TBili  0.4  /  DBili  x   /  AST  53  /  ALT  56  /  AlkPhos  163  12-05 @ 10:08    PT/INR - ( 05 Dec 2017 11:42 )   PT: 10.7 sec;   INR: 0.99 ratio         PTT - ( 05 Dec 2017 11:42 )  PTT:31.1 sec  Urinalysis Basic - ( 06 Dec 2017 05:38 )    Color: Colorless / Appearance: SL Turbid / S.006 / pH: x  Gluc: x / Ketone: Negative  / Bili: Negative / Urobili: Negative   Blood: x / Protein: Trace / Nitrite: Positive   Leuk Esterase: Large / RBC: 10-25 /HPF / WBC >50 /HPF   Sq Epi: x / Non Sq Epi: OCC /HPF / Bacteria: Few /HPF

## 2017-12-08 ENCOUNTER — TRANSCRIPTION ENCOUNTER (OUTPATIENT)
Age: 27
End: 2017-12-08

## 2017-12-08 VITALS — DIASTOLIC BLOOD PRESSURE: 76 MMHG | SYSTOLIC BLOOD PRESSURE: 126 MMHG

## 2017-12-08 LAB
-  AMIKACIN: SIGNIFICANT CHANGE UP
-  AMPICILLIN/SULBACTAM: SIGNIFICANT CHANGE UP
-  AMPICILLIN: SIGNIFICANT CHANGE UP
-  AZTREONAM: SIGNIFICANT CHANGE UP
-  CEFAZOLIN: SIGNIFICANT CHANGE UP
-  CEFEPIME: SIGNIFICANT CHANGE UP
-  CEFOXITIN: SIGNIFICANT CHANGE UP
-  CEFTAZIDIME: SIGNIFICANT CHANGE UP
-  CEFTRIAXONE: SIGNIFICANT CHANGE UP
-  CIPROFLOXACIN: SIGNIFICANT CHANGE UP
-  ERTAPENEM: SIGNIFICANT CHANGE UP
-  GENTAMICIN: SIGNIFICANT CHANGE UP
-  IMIPENEM: SIGNIFICANT CHANGE UP
-  LEVOFLOXACIN: SIGNIFICANT CHANGE UP
-  MEROPENEM: SIGNIFICANT CHANGE UP
-  NITROFURANTOIN: SIGNIFICANT CHANGE UP
-  PIPERACILLIN/TAZOBACTAM: SIGNIFICANT CHANGE UP
-  TOBRAMYCIN: SIGNIFICANT CHANGE UP
-  TRIMETHOPRIM/SULFAMETHOXAZOLE: SIGNIFICANT CHANGE UP
ALBUMIN SERPL ELPH-MCNC: 4 G/DL — SIGNIFICANT CHANGE UP (ref 3.3–5)
ALP SERPL-CCNC: 132 U/L — HIGH (ref 40–120)
ALT FLD-CCNC: 51 U/L RC — HIGH (ref 10–45)
ANION GAP SERPL CALC-SCNC: 14 MMOL/L — SIGNIFICANT CHANGE UP (ref 5–17)
APPEARANCE UR: CLEAR — SIGNIFICANT CHANGE UP
AST SERPL-CCNC: 19 U/L — SIGNIFICANT CHANGE UP (ref 10–40)
BACTERIA # UR AUTO: ABNORMAL /HPF
BILIRUB SERPL-MCNC: 0.3 MG/DL — SIGNIFICANT CHANGE UP (ref 0.2–1.2)
BILIRUB UR-MCNC: NEGATIVE — SIGNIFICANT CHANGE UP
BUN SERPL-MCNC: 12 MG/DL — SIGNIFICANT CHANGE UP (ref 7–23)
CALCIUM SERPL-MCNC: 9.5 MG/DL — SIGNIFICANT CHANGE UP (ref 8.4–10.5)
CHLORIDE SERPL-SCNC: 101 MMOL/L — SIGNIFICANT CHANGE UP (ref 96–108)
CO2 SERPL-SCNC: 24 MMOL/L — SIGNIFICANT CHANGE UP (ref 22–31)
COLOR SPEC: SIGNIFICANT CHANGE UP
CREAT SERPL-MCNC: 0.68 MG/DL — SIGNIFICANT CHANGE UP (ref 0.5–1.3)
CULTURE RESULTS: SIGNIFICANT CHANGE UP
DIFF PNL FLD: ABNORMAL
EPI CELLS # UR: SIGNIFICANT CHANGE UP /HPF
GLUCOSE SERPL-MCNC: 107 MG/DL — HIGH (ref 70–99)
GLUCOSE UR QL: NEGATIVE — SIGNIFICANT CHANGE UP
HCT VFR BLD CALC: 41.1 % — SIGNIFICANT CHANGE UP (ref 34.5–45)
HGB BLD-MCNC: 14.1 G/DL — SIGNIFICANT CHANGE UP (ref 11.5–15.5)
KETONES UR-MCNC: NEGATIVE — SIGNIFICANT CHANGE UP
LEUKOCYTE ESTERASE UR-ACNC: ABNORMAL
MAGNESIUM SERPL-MCNC: 1.9 MG/DL — SIGNIFICANT CHANGE UP (ref 1.6–2.6)
MCHC RBC-ENTMCNC: 30.9 PG — SIGNIFICANT CHANGE UP (ref 27–34)
MCHC RBC-ENTMCNC: 34.2 GM/DL — SIGNIFICANT CHANGE UP (ref 32–36)
MCV RBC AUTO: 90.5 FL — SIGNIFICANT CHANGE UP (ref 80–100)
METHOD TYPE: SIGNIFICANT CHANGE UP
NITRITE UR-MCNC: NEGATIVE — SIGNIFICANT CHANGE UP
ORGANISM # SPEC MICROSCOPIC CNT: SIGNIFICANT CHANGE UP
ORGANISM # SPEC MICROSCOPIC CNT: SIGNIFICANT CHANGE UP
PH UR: 6.5 — SIGNIFICANT CHANGE UP (ref 5–8)
PHOSPHATE SERPL-MCNC: 3.6 MG/DL — SIGNIFICANT CHANGE UP (ref 2.5–4.5)
PLATELET # BLD AUTO: 295 K/UL — SIGNIFICANT CHANGE UP (ref 150–400)
POTASSIUM SERPL-MCNC: 4.2 MMOL/L — SIGNIFICANT CHANGE UP (ref 3.5–5.3)
POTASSIUM SERPL-SCNC: 4.2 MMOL/L — SIGNIFICANT CHANGE UP (ref 3.5–5.3)
PROT SERPL-MCNC: 7.3 G/DL — SIGNIFICANT CHANGE UP (ref 6–8.3)
PROT UR-MCNC: NEGATIVE — SIGNIFICANT CHANGE UP
RBC # BLD: 4.55 M/UL — SIGNIFICANT CHANGE UP (ref 3.8–5.2)
RBC # FLD: 10.9 % — SIGNIFICANT CHANGE UP (ref 10.3–14.5)
RBC CASTS # UR COMP ASSIST: ABNORMAL /HPF (ref 0–2)
SODIUM SERPL-SCNC: 139 MMOL/L — SIGNIFICANT CHANGE UP (ref 135–145)
SP GR SPEC: <1.005 — LOW (ref 1.01–1.02)
SPECIMEN SOURCE: SIGNIFICANT CHANGE UP
UROBILINOGEN FLD QL: NEGATIVE — SIGNIFICANT CHANGE UP
WBC # BLD: 6.9 K/UL — SIGNIFICANT CHANGE UP (ref 3.8–10.5)
WBC # FLD AUTO: 6.9 K/UL — SIGNIFICANT CHANGE UP (ref 3.8–10.5)
WBC UR QL: SIGNIFICANT CHANGE UP /HPF (ref 0–5)

## 2017-12-08 PROCEDURE — 84100 ASSAY OF PHOSPHORUS: CPT

## 2017-12-08 PROCEDURE — 84550 ASSAY OF BLOOD/URIC ACID: CPT

## 2017-12-08 PROCEDURE — 99233 SBSQ HOSP IP/OBS HIGH 50: CPT | Mod: GC

## 2017-12-08 PROCEDURE — 83605 ASSAY OF LACTIC ACID: CPT

## 2017-12-08 PROCEDURE — 85610 PROTHROMBIN TIME: CPT

## 2017-12-08 PROCEDURE — 85730 THROMBOPLASTIN TIME PARTIAL: CPT

## 2017-12-08 PROCEDURE — 87186 SC STD MICRODIL/AGAR DIL: CPT

## 2017-12-08 PROCEDURE — 81001 URINALYSIS AUTO W/SCOPE: CPT

## 2017-12-08 PROCEDURE — 70450 CT HEAD/BRAIN W/O DYE: CPT

## 2017-12-08 PROCEDURE — 82803 BLOOD GASES ANY COMBINATION: CPT

## 2017-12-08 PROCEDURE — 87086 URINE CULTURE/COLONY COUNT: CPT

## 2017-12-08 PROCEDURE — 85014 HEMATOCRIT: CPT

## 2017-12-08 PROCEDURE — 84295 ASSAY OF SERUM SODIUM: CPT

## 2017-12-08 PROCEDURE — 82330 ASSAY OF CALCIUM: CPT

## 2017-12-08 PROCEDURE — 93005 ELECTROCARDIOGRAM TRACING: CPT

## 2017-12-08 PROCEDURE — 93306 TTE W/DOPPLER COMPLETE: CPT

## 2017-12-08 PROCEDURE — 83615 LACTATE (LD) (LDH) ENZYME: CPT

## 2017-12-08 PROCEDURE — 93306 TTE W/DOPPLER COMPLETE: CPT | Mod: 26

## 2017-12-08 PROCEDURE — 82435 ASSAY OF BLOOD CHLORIDE: CPT

## 2017-12-08 PROCEDURE — 99285 EMERGENCY DEPT VISIT HI MDM: CPT | Mod: 25

## 2017-12-08 PROCEDURE — 84132 ASSAY OF SERUM POTASSIUM: CPT

## 2017-12-08 PROCEDURE — 85384 FIBRINOGEN ACTIVITY: CPT

## 2017-12-08 PROCEDURE — 71045 X-RAY EXAM CHEST 1 VIEW: CPT

## 2017-12-08 PROCEDURE — 82947 ASSAY GLUCOSE BLOOD QUANT: CPT

## 2017-12-08 PROCEDURE — 85027 COMPLETE CBC AUTOMATED: CPT

## 2017-12-08 PROCEDURE — 83735 ASSAY OF MAGNESIUM: CPT

## 2017-12-08 PROCEDURE — 80053 COMPREHEN METABOLIC PANEL: CPT

## 2017-12-08 RX ORDER — NIFEDIPINE 30 MG
1 TABLET, EXTENDED RELEASE 24 HR ORAL
Qty: 0 | Refills: 0 | COMMUNITY
Start: 2017-12-08

## 2017-12-08 RX ORDER — METOCLOPRAMIDE HCL 10 MG
10 TABLET ORAL EVERY 6 HOURS
Qty: 0 | Refills: 0 | Status: DISCONTINUED | OUTPATIENT
Start: 2017-12-08 | End: 2017-12-08

## 2017-12-08 RX ORDER — NITROFURANTOIN MACROCRYSTAL 50 MG
1 CAPSULE ORAL
Qty: 0 | Refills: 0 | COMMUNITY
Start: 2017-12-08

## 2017-12-08 RX ORDER — METOCLOPRAMIDE HCL 10 MG
10 TABLET ORAL ONCE
Qty: 0 | Refills: 0 | Status: COMPLETED | OUTPATIENT
Start: 2017-12-08 | End: 2017-12-08

## 2017-12-08 RX ORDER — ONDANSETRON 8 MG/1
4 TABLET, FILM COATED ORAL ONCE
Qty: 0 | Refills: 0 | Status: DISCONTINUED | OUTPATIENT
Start: 2017-12-08 | End: 2017-12-08

## 2017-12-08 RX ORDER — ACETAMINOPHEN 500 MG
3 TABLET ORAL
Qty: 0 | Refills: 0 | COMMUNITY
Start: 2017-12-08

## 2017-12-08 RX ORDER — ONDANSETRON 8 MG/1
4 TABLET, FILM COATED ORAL ONCE
Qty: 0 | Refills: 0 | Status: COMPLETED | OUTPATIENT
Start: 2017-12-08 | End: 2017-12-08

## 2017-12-08 RX ORDER — METOCLOPRAMIDE HCL 10 MG
1 TABLET ORAL
Qty: 0 | Refills: 0 | COMMUNITY
Start: 2017-12-08

## 2017-12-08 RX ORDER — FERROUS FUMARATE 350(115)MG
1 TABLET ORAL
Qty: 0 | Refills: 0 | COMMUNITY

## 2017-12-08 RX ORDER — LABETALOL HCL 100 MG
1 TABLET ORAL
Qty: 0 | Refills: 0 | COMMUNITY
Start: 2017-12-08

## 2017-12-08 RX ADMIN — Medication 300 MILLIGRAM(S): at 14:58

## 2017-12-08 RX ADMIN — ONDANSETRON 4 MILLIGRAM(S): 8 TABLET, FILM COATED ORAL at 06:45

## 2017-12-08 RX ADMIN — Medication 1 MILLIGRAM(S): at 18:52

## 2017-12-08 RX ADMIN — Medication 100 MILLIGRAM(S): at 14:58

## 2017-12-08 RX ADMIN — Medication 300 MILLIGRAM(S): at 21:02

## 2017-12-08 RX ADMIN — Medication 300 MILLIGRAM(S): at 05:55

## 2017-12-08 RX ADMIN — SODIUM CHLORIDE 3 MILLILITER(S): 9 INJECTION INTRAMUSCULAR; INTRAVENOUS; SUBCUTANEOUS at 14:05

## 2017-12-08 RX ADMIN — ESCITALOPRAM OXALATE 10 MILLIGRAM(S): 10 TABLET, FILM COATED ORAL at 21:02

## 2017-12-08 RX ADMIN — SODIUM CHLORIDE 3 MILLILITER(S): 9 INJECTION INTRAMUSCULAR; INTRAVENOUS; SUBCUTANEOUS at 21:01

## 2017-12-08 RX ADMIN — Medication 1 MILLIGRAM(S): at 11:28

## 2017-12-08 RX ADMIN — SODIUM CHLORIDE 3 MILLILITER(S): 9 INJECTION INTRAMUSCULAR; INTRAVENOUS; SUBCUTANEOUS at 05:49

## 2017-12-08 RX ADMIN — Medication 30 MILLIGRAM(S): at 21:02

## 2017-12-08 RX ADMIN — Medication 10 MILLIGRAM(S): at 07:40

## 2017-12-08 NOTE — DISCHARGE NOTE ADULT - HOSPITAL COURSE
Patient admitted for postpartum severe preeclampsia.  Patient delivered on 12/1. 28 y/o  s/p VAVD on  complicated by postpartum preeclampsia.  Patient was discharged on PPD3 in stable condition. While inpatient, patient received Magnesium infusion from 12/3-.  Her blood pressure was initially controlled with procardia 10 IR.  Patient was discharged on  on Labetalol 200 TID with BPs well-controlled and asymptomatic.  On PPD4 morning, patient woke with blurry vision and presented to [facility] ED.  There, she had SBPs in 200s.  She received Hydralazine 20 IVP and Magnesium loading bolus.  [facility] to [facility], patient had mild headache, BPs 146/84.  Blurry vision has resolved.  Patient was admitted and continue magnesium for 24 hours for PPX.  Blood work was significant for slightly elevated AST and ALT, improving on HD4.  Patient was seen by cardiology consultation service and blood pressure medication titrated to labetalol 300mg TID and nifedipine 30xl daily.  Patient had a normal Echocardiogram on day of discharge.  Patient c/o nausea and poor PO intake since delivery.  Blood work and urinalaysis sent on HD4 was within normal except for AST/ALT as mentioned above.   Patient was stable for discharge home on HD4 in stable condition.  She was encouraged to continue frequent small meals and PO hydration.  Patient to follow-up within 1 week with cardiologist and 2 weeks with OB.

## 2017-12-08 NOTE — DISCHARGE NOTE ADULT - CARE PROVIDER_API CALL
Yesi Vazquez), [facility] [place]  [address] [place] [address] [address]  [place] [address]  Phone: [phone]  Fax: [phone]    Aly Ulloa), Cardiology; Internal Medicine  [address] [place] [address]  Phone: [phone]  Fax: [phone]

## 2017-12-08 NOTE — PROVIDER CONTACT NOTE (OTHER) - BACKGROUND
Post partum readmit s/p Magnesium Sulfate
a/w PP sPEC, s/p VAVD 12/1
Postpartum day 5 readmit s/p Magnesium Sulfate as 0f 0800

## 2017-12-08 NOTE — PROGRESS NOTE ADULT - NSHPATTENDINGPLANDISCUSS_GEN_ALL_CORE
Patient seen and examined; discussed with cardiology fellow, Dr. NICOL Malone. Hx., exam and labs as above.  I agree with the assessment and recommendations.   Pilo Ortega M.D.  Cardiology Consult Service  [phone] or [phone]

## 2017-12-08 NOTE — DISCHARGE NOTE ADULT - MEDICATION SUMMARY - MEDICATIONS TO STOP TAKING
I will STOP taking the medications listed below when I get home from the hospital:    labetalol 200 mg oral tablet  -- 1 tab(s) by mouth every 8 hours

## 2017-12-08 NOTE — DISCHARGE NOTE ADULT - PATIENT PORTAL LINK FT
“You can access the [facility] Patient Portal, offered by [facility], by registering with the following website: [email or web address]”

## 2017-12-08 NOTE — DIETITIAN INITIAL EVALUATION ADULT. - OTHER INFO
Pt is a  s/p delivery on  readmitted with pre eclampsia. Pt and mom requesting visit from RD for persistent nausea and decreased appetite since delivery. Pt reports nausea since last night and limited PO intake of fluids and solids. No emesis, eating small bites of crackers and muffin at time of visit. Per Pt's mom feels Pt is dehydrated and to address Pt's fluid status with MD. Pt and family very receptive to discussed nutrition interventions and Pt provided with food preferences. Bottle feeding . NKFA

## 2017-12-08 NOTE — PROGRESS NOTE ADULT - SUBJECTIVE AND OBJECTIVE BOX
Patient seen at bedside with family.      Labs reviewed and wnl except for slightly elevated AST/ALT that are improved from yesterday.  BPs well controlled on current meds.  Reviewed discharge instructions, medications and follow-up.  Reassurance provided.  Patient reported improved nausea and more PO intake.      OK to discharge home.      Yesi Vazquez MD

## 2017-12-08 NOTE — PROGRESS NOTE ADULT - ASSESSMENT
26 yo  #4 a/w PP PEC s/p VAVD () 2/2 NRFHT
26 yo  HD#2 a/w PP PEC s/p VAVD () 2/2 NRFHT w/ severe range BPs
26 yo  HD#3 a/w PP PEC s/p VAVD () 2/2 NRFHT w/ severe range BPs overnight
27 year old  readmit for PP sPEC, s/p VAVD () 2/2 NRFHT

## 2017-12-08 NOTE — DISCHARGE NOTE ADULT - CARE PLAN
Principal Discharge DX:	Pre-eclampsia, delivered  Goal:	Maintain control of blood pressures  Instructions for follow-up, activity and diet:	Take blood pressure 5-6 times per day, before taking medication and 4 hours after taking labetalol.  Please call doctor if blood pressure is persistantly >155 systolic and/or >105 diastolic.  Hold medication for persistent blood pressure less than 100/60. Principal Discharge DX:	Pre-eclampsia, delivered  Goal:	Maintain control of blood pressures  Instructions for follow-up, activity and diet:	Take blood pressure 5-6 times per day, before taking medication and 4 hours after taking labetalol.  Please call doctor if blood pressure is persistantly >155 systolic and/or >105 diastolic.  Hold medication for persistent blood pressure less than 100/60.  Please make appointment to be seen by cardiologist in the next 1 week.  Please make appointment to see OB in 2 weeks.

## 2017-12-08 NOTE — PROGRESS NOTE ADULT - SUBJECTIVE AND OBJECTIVE BOX
INTERVAL HPI/OVERNIGHT EVENTS: Pt seen and examined at bedside.  No complaints overnight. Patient just woke up and is feeling anxious. Denies HA, blurry vision, CP/SOB, RUQ pain, epigastric pain. No fevers, chills, nausea, emesis. OOB, tolerating PO, voiding.     MEDICATIONS  (STANDING):  acetaminophen   Tablet. 975 milliGRAM(s) Oral every 6 hours  ALPRAZolam 0.5 milliGRAM(s) Oral once  diphenhydrAMINE   Capsule 25 milliGRAM(s) Oral once  diphtheria/tetanus/pertussis (acellular) Vaccine (ADAcel) 0.5 milliLiter(s) IntraMuscular once  escitalopram 10 milliGRAM(s) Oral daily  escitalopram 20 milliGRAM(s) Oral daily  labetalol 300 milliGRAM(s) Oral every 8 hours  lactated ringers. 1000 milliLiter(s) (50 mL/Hr) IV Continuous <Continuous>  NIFEdipine XL 30 milliGRAM(s) Oral at bedtime  nitrofurantoin (MACROBID) 100 milliGRAM(s) Oral two times a day with meals  oxytocin Infusion 41.667 milliUNIT(s)/Min (125 mL/Hr) IV Continuous <Continuous>  prenatal multivitamin 1 Tablet(s) Oral daily  sodium chloride 0.9% lock flush 3 milliLiter(s) IV Push every 8 hours    MEDICATIONS  (PRN):  ALPRAZolam 1 milliGRAM(s) Oral three times a day PRN anxiety  dibucaine 1% Ointment 1 Application(s) Topical every 4 hours PRN Perineal Discomfort  diphenhydrAMINE   Capsule 25 milliGRAM(s) Oral every 6 hours PRN Itching  docusate sodium 100 milliGRAM(s) Oral two times a day PRN Stool Softening  glycerin Suppository - Adult 1 Suppository(s) Rectal at bedtime PRN Constipation  hydrocortisone 1% Cream 1 Application(s) Topical every 4 hours PRN Moderate to Severe Perineal Pain  lanolin Ointment 1 Application(s) Topical every 6 hours PRN Sore Nipples  magnesium hydroxide Suspension 30 milliLiter(s) Oral two times a day PRN Constipation  pramoxine 1%/zinc 5% Cream 1 Application(s) Topical every 4 hours PRN Moderate to Severe Perineal Pain  simethicone 80 milliGRAM(s) Chew every 6 hours PRN Gas  witch hazel Pads 1 Application(s) Topical every 4 hours PRN Perineal Discomfort      Allergies    No Known Allergies    Intolerances        12 point ROS negative except as outlined above    Vital Signs Last 24 Hrs  T(C): 36.5 (08 Dec 2017 00:58), Max: 37 (07 Dec 2017 06:31)  T(F): 97.7 (08 Dec 2017 00:58), Max: 98.6 (07 Dec 2017 06:31)  HR: 65 (08 Dec 2017 00:58) (65 - 90)  BP: 130/84 (08 Dec 2017 00:58) (122/72 - 142/88)  BP(mean): --  RR: 18 (08 Dec 2017 00:58) (18 - 18)  SpO2: 98% (08 Dec 2017 00:58) (97% - 100%)      PHYSICAL EXAM:    GA: NAD, A+0 x 3  CV: RRR  Pulm: CTA BL  Abd: soft, nontender, nondistended, no rebound or guarding,   Extremities: no swelling or calf tenderness, reflexes +2 bilaterally          LABS:             12.9   7.9   )-----------( 232      (  @ 07:09 )             37.6                13.4   11.1  )-----------( 198      (  @ 11:42 )             38.7                13.4   7.6   )-----------( 196      (  @ 10:08 )             40.2        @ 07:09    138  |  104  |  9   ----------------------------<  118  4.3   |  21  |  0.62     @ 11:42    143  |  108  |  9   ----------------------------<  97  3.9   |  21  |  0.60     @ 10:08    138  |  106  |  11  ----------------------------<  91  4.2   |  24  |  0.74      Mg     6.6      @ 05:38  Mg     6.0      @ 23:20  Mg     4.4      @ 17:04  Mg     2.1      @ 10:08    TPro  6.6  /  Alb  3.6  /  TBili  0.3  /  DBili  x   /  AST  41  /  ALT  60  /  AlkPhos  128   @ 07:09  TPro  6.4  /  Alb  3.5  /  TBili  0.3  /  DBili  x   /  AST  44  /  ALT  46  /  AlkPhos  139   @ 11:42  TPro  7.3  /  Alb  3.0  /  TBili  0.4  /  DBili  x   /  AST  53  /  ALT  56  /  AlkPhos  163   @ 10:08    PT/INR - ( 07 Dec 2017 07:09 )   PT: 11.6 sec;   INR: 1.07 ratio         PTT - ( 07 Dec 2017 07:09 )  PTT:30.2 sec  Urinalysis Basic - ( 06 Dec 2017 05:38 )    Color: Colorless / Appearance: SL Turbid / S.006 / pH: x  Gluc: x / Ketone: Negative  / Bili: Negative / Urobili: Negative   Blood: x / Protein: Trace / Nitrite: Positive   Leuk Esterase: Large / RBC: 10-25 /HPF / WBC >50 /HPF   Sq Epi: x / Non Sq Epi: OCC /HPF / Bacteria: Few /HPF        RADIOLOGY & ADDITIONAL TESTS:

## 2017-12-08 NOTE — PROVIDER CONTACT NOTE (OTHER) - ACTION/TREATMENT ORDERED:
Zofran 4mg IVP, Labetalol 300mg PO and Procardia XL 30mg PO ordered and given. Will continue to monitor. Zofran 4mg IVP, Labetalol 300mg PO and Procardia XL 30mg PO given as ordered. Will continue to monitor.

## 2017-12-08 NOTE — PROVIDER CONTACT NOTE (OTHER) - ASSESSMENT
Pt complaining of blurry vision, headache denies RUQ epigastric pain.  /100
Pt. c/o feeling anxious and nauseous. Denies HA, lightheadedness, pain in the back of neck, SOB, CP and upper gastric pain.
Pt complaining of headache, no blurry vision, RUQ epigastric pain, /89

## 2017-12-08 NOTE — DISCHARGE NOTE ADULT - MEDICATION SUMMARY - MEDICATIONS TO TAKE
I will START or STAY ON the medications listed below when I get home from the hospital:    ibuprofen 600 mg oral tablet  -- 1 tab(s) by mouth every 6 hours  -- Indication: For Pain    Tylenol 325 mg oral tablet  -- 3 tab(s) by mouth every 6 hours  -- Indication: For Pain    escitalopram 20 mg oral tablet  -- 1 tab(s) by mouth once a day  -- Indication: For depression    Reglan 10 mg oral tablet  -- 1 tab(s) by mouth every 6 hours, As needed, nausea  -- Indication: For nausea and vomiting     labetalol 300 mg oral tablet  -- 1 tab(s) by mouth every 8 hours  -- Indication: For Pre-eclampsia affecting childbirth    NIFEdipine 30 mg oral tablet, extended release  -- 1 tab(s) by mouth once a day (at bedtime)  -- Indication: For Pre-eclampsia affecting childbirth    Prenatal Multivitamins with Folic Acid 1 mg oral tablet  -- 1 tab(s) by mouth once a day  -- Indication: For routine postpartum care    nitrofurantoin macrocrystals-monohydrate 100 mg oral capsule  -- 1 cap(s) by mouth 2 times a day (with meals)  -- Indication: For urinary tract infection

## 2017-12-08 NOTE — DISCHARGE NOTE ADULT - PLAN OF CARE
Maintain control of blood pressures Take blood pressure 5-6 times per day, before taking medication and 4 hours after taking labetalol.  Please call doctor if blood pressure is persistantly >155 systolic and/or >105 diastolic.  Hold medication for persistent blood pressure less than 100/60. Take blood pressure 5-6 times per day, before taking medication and 4 hours after taking labetalol.  Please call doctor if blood pressure is persistantly >155 systolic and/or >105 diastolic.  Hold medication for persistent blood pressure less than 100/60.  Please make appointment to be seen by cardiologist in the next 1 week.  Please make appointment to see OB in 2 weeks.

## 2017-12-08 NOTE — DIETITIAN INITIAL EVALUATION ADULT. - NUTRITIONGOAL OUTCOME1
In order to meet Medicare requirements, the clinical documentation must support the information cited in the admission order.  Please be sure to provide detailed and clear documentation about the following in the admitting note/history and physical:
Pt to meet >75% estimated nutritional needs

## 2017-12-08 NOTE — PROGRESS NOTE ADULT - ATTENDING COMMENTS
OB attending     PPD6 s/p VAVD on 12/2 readmitted for severe preeclampsia.  Patient seen and examined.     #severe preeclampsia- bps well controlled on meds, appreciate cardiology consult.  \  -continue procardia, labetalol   -f/u ECHO from today     #nausea and vomiting - reports increased today and inability to tolerate PO much.    -f/u labs and urine   -reglan prn   -may offer IVF  if electrolytes abnormal     #routine PP care   -DC planing underway, may stay until tonight/tomorrow depending on how she feels with diet/nausea
note appreciated  bp appears stable, 120-130's/80's  abdomen soft, non tender  labs appreciated  magnesium sulfate d/c  to start macrobid for suspected uti  in view of the patient's clinical course, will observe in the hospital today to be sure her bp remains stable  and will aim for discharge tomorrow

## 2017-12-08 NOTE — PROGRESS NOTE ADULT - PROBLEM SELECTOR PLAN 1
-s/p magnesium for seizure prophylaxis  -Appreciate cardiology consult, will continue with labetalol and  procardia  -Reg diet  -OOB  -Routine inpatient care      Todd PGY3.

## 2017-12-08 NOTE — PROGRESS NOTE ADULT - SUBJECTIVE AND OBJECTIVE BOX
24H hour events:   No headache, blurry vision. Blood pressure well controlled overnight  MEDICATIONS:  labetalol 300 milliGRAM(s) Oral every 8 hours  NIFEdipine XL 30 milliGRAM(s) Oral at bedtime    nitrofurantoin (MACROBID) 100 milliGRAM(s) Oral two times a day with meals      acetaminophen   Tablet. 975 milliGRAM(s) Oral every 6 hours  ALPRAZolam 0.5 milliGRAM(s) Oral once  ALPRAZolam 1 milliGRAM(s) Oral three times a day PRN  diphenhydrAMINE   Capsule 25 milliGRAM(s) Oral every 6 hours PRN  diphenhydrAMINE   Capsule 25 milliGRAM(s) Oral once  escitalopram 10 milliGRAM(s) Oral daily  escitalopram 20 milliGRAM(s) Oral daily    docusate sodium 100 milliGRAM(s) Oral two times a day PRN  glycerin Suppository - Adult 1 Suppository(s) Rectal at bedtime PRN  magnesium hydroxide Suspension 30 milliLiter(s) Oral two times a day PRN  simethicone 80 milliGRAM(s) Chew every 6 hours PRN      dibucaine 1% Ointment 1 Application(s) Topical every 4 hours PRN  diphtheria/tetanus/pertussis (acellular) Vaccine (ADAcel) 0.5 milliLiter(s) IntraMuscular once  hydrocortisone 1% Cream 1 Application(s) Topical every 4 hours PRN  lactated ringers. 1000 milliLiter(s) IV Continuous <Continuous>  lanolin Ointment 1 Application(s) Topical every 6 hours PRN  oxytocin Infusion 41.667 milliUNIT(s)/Min IV Continuous <Continuous>  pramoxine 1%/zinc 5% Cream 1 Application(s) Topical every 4 hours PRN  prenatal multivitamin 1 Tablet(s) Oral daily  witch hazel Pads 1 Application(s) Topical every 4 hours PRN      REVIEW OF SYSTEMS:  CONSTITUTIONAL: no fevers or chills  EYES/ENT: No visual changes; No vertigo or throat pain  NECK: No pain or stiffness  RESPIRATORY: No cough, wheezing  CARDIOVASCULAR: No chest pain or palpitations  GASTOINTESTINAL: No abdominal or epigastric pain. No nausea, vomiting. No diarrhea. No melena  NEUROLOGICAL: No numbness or weakness  SKIN: No itching, burning, rashes or lesions  All other review of systems is negative unless indicated above    PHYSICAL EXAM:  T(C): 36.5 (12-08-17 @ 00:58), Max: 37 (12-07-17 @ 06:31)  HR: 65 (12-08-17 @ 00:58) (65 - 90)  BP: 130/84 (12-08-17 @ 00:58) (122/72 - 142/88)  RR: 18 (12-08-17 @ 00:58) (18 - 18)  SpO2: 98% (12-08-17 @ 00:58) (97% - 100%)  Wt(kg): --  I&O's Summary      Appearance: Normal	  HEENT:   Normal oral mucosa, PERRL, EOMI	  Lymphatic: No lymphadenopathy  Cardiovascular: Normal S1 S2, No JVD, No murmurs, No edema  Respiratory: Lungs clear to auscultation	  Psychiatry: A & O x 3, Mood & affect appropriate  Gastrointestinal:  Soft, Non-tender, + BS	  Skin: No rashes, No ecchymoses, No cyanosis	  Neurologic: Non-focal  Extremities: Normal range of motion, No clubbing, cyanosis or edema  Vascular: Peripheral pulses palpable 2+ bilaterally        LABS:	 	    CBC Full  -  ( 07 Dec 2017 07:09 )  WBC Count : 7.9 K/uL  Hemoglobin : 12.9 g/dL  Hematocrit : 37.6 %  Platelet Count - Automated : 232 K/uL  Mean Cell Volume : 90.6 fl  Mean Cell Hemoglobin : 31.0 pg  Mean Cell Hemoglobin Concentration : 34.2 gm/dL  Auto Neutrophil # : 6.2 K/uL  Auto Lymphocyte # : 1.0 K/uL  Auto Monocyte # : 0.5 K/uL  Auto Eosinophil # : 0.1 K/uL  Auto Basophil # : 0.0 K/uL  Auto Neutrophil % : 79.0 %  Auto Lymphocyte % : 12.5 %  Auto Monocyte % : 6.5 %  Auto Eosinophil % : 1.8 %  Auto Basophil % : 0.2 %    12-07    138  |  104  |  9   ----------------------------<  118<H>  4.3   |  21<L>  |  0.62    Ca    9.4      07 Dec 2017 07:09    TPro  6.6  /  Alb  3.6  /  TBili  0.3  /  DBili  x   /  AST  41<H>  /  ALT  60<H>  /  AlkPhos  128<H>  12-07    ASSESSMENT/PLAN: 	    26yo female with pregnancy induced HTN, p/w hypertensive emergency-now resolved    1. Hypertensive emergency  -Now resolved. BP well controlled. Continue nefedipine, labetalol at current doses  -Check echocardiogram prior to discharge    Kenneth Malone [id number] 24H hour events:   No headache, blurry vision. Blood pressure well controlled overnight    MEDICATIONS:  labetalol 300 milliGRAM(s) Oral every 8 hours  NIFEdipine XL 30 milliGRAM(s) Oral at bedtime  nitrofurantoin (MACROBID) 100 milliGRAM(s) Oral two times a day with meals  acetaminophen   Tablet. 975 milliGRAM(s) Oral every 6 hours  ALPRAZolam 0.5 milliGRAM(s) Oral once  ALPRAZolam 1 milliGRAM(s) Oral three times a day PRN  diphenhydrAMINE   Capsule 25 milliGRAM(s) Oral every 6 hours PRN  diphenhydrAMINE   Capsule 25 milliGRAM(s) Oral once  escitalopram 10 milliGRAM(s) Oral daily  escitalopram 20 milliGRAM(s) Oral daily  docusate sodium 100 milliGRAM(s) Oral two times a day PRN  glycerin Suppository - Adult 1 Suppository(s) Rectal at bedtime PRN  magnesium hydroxide Suspension 30 milliLiter(s) Oral two times a day PRN  simethicone 80 milliGRAM(s) Chew every 6 hours PRN  dibucaine 1% Ointment 1 Application(s) Topical every 4 hours PRN  diphtheria/tetanus/pertussis (acellular) Vaccine (ADAcel) 0.5 milliLiter(s) IntraMuscular once  hydrocortisone 1% Cream 1 Application(s) Topical every 4 hours PRN  lactated ringers. 1000 milliLiter(s) IV Continuous <Continuous>  lanolin Ointment 1 Application(s) Topical every 6 hours PRN  oxytocin Infusion 41.667 milliUNIT(s)/Min IV Continuous <Continuous>  pramoxine 1%/zinc 5% Cream 1 Application(s) Topical every 4 hours PRN  prenatal multivitamin 1 Tablet(s) Oral daily  witch hazel Pads 1 Application(s) Topical every 4 hours PRN    REVIEW OF SYSTEMS:  CONSTITUTIONAL: no fevers or chills  EYES/ENT: No visual changes; No vertigo or throat pain  NECK: No pain or stiffness  RESPIRATORY: No cough, wheezing  CARDIOVASCULAR: No chest pain or palpitations  GASTOINTESTINAL: No abdominal or epigastric pain. No nausea, vomiting. No diarrhea. No melena  NEUROLOGICAL: No numbness or weakness  SKIN: No itching, burning, rashes or lesions  All other review of systems is negative unless indicated above    PHYSICAL EXAM:  T(C): 36.5 (12-08-17 @ 00:58), Max: 37 (12-07-17 @ 06:31)  HR: 65 (12-08-17 @ 00:58) (65 - 90)  BP: 130/84 (12-08-17 @ 00:58) (122/72 - 142/88)  RR: 18 (12-08-17 @ 00:58) (18 - 18)  SpO2: 98% (12-08-17 @ 00:58) (97% - 100%)    Appearance: Normal	  HEENT:   Normal oral mucosa, PERRL, EOMI	  Lymphatic: No lymphadenopathy  Cardiovascular: Normal S1 S2, No JVD, No murmurs, No edema  Respiratory: Lungs clear to auscultation	  Psychiatry: A & O x 3, Mood & affect appropriate  Gastrointestinal:  Soft, Non-tender, + BS	  Skin: No rashes, No ecchymoses, No cyanosis	  Neurologic: Non-focal  Extremities: Normal range of motion, No clubbing, cyanosis or edema  Vascular: Peripheral pulses palpable 2+ bilaterally    LABS:	 	    CBC Full  -  ( 07 Dec 2017 07:09 )  WBC Count : 7.9 K/uL  Hemoglobin : 12.9 g/dL  Hematocrit : 37.6 %  Platelet Count - Automated : 232 K/uL  Mean Cell Volume : 90.6 fl  Mean Cell Hemoglobin : 31.0 pg  Mean Cell Hemoglobin Concentration : 34.2 gm/dL  Auto Neutrophil # : 6.2 K/uL  Auto Lymphocyte # : 1.0 K/uL  Auto Monocyte # : 0.5 K/uL  Auto Eosinophil # : 0.1 K/uL  Auto Basophil # : 0.0 K/uL  Auto Neutrophil % : 79.0 %  Auto Lymphocyte % : 12.5 %  Auto Monocyte % : 6.5 %  Auto Eosinophil % : 1.8 %  Auto Basophil % : 0.2 %    12-07  138  |  104  |  9   ----------------------------<  118<H>  4.3   |  21<L>  |  0.62    Ca    9.4      07 Dec 2017 07:09    TPro  6.6  /  Alb  3.6  /  TBili  0.3  /  DBili  x   /  AST  41<H>  /  ALT  60<H>  /  AlkPhos  128<H>  12-07    ASSESSMENT/PLAN: 	    28yo female with pregnancy induced HTN, p/w hypertensive emergency - BP controlled at present.    1. Hypertensive emergency  - BP improved.  Continue nifedipine ER, labetalol at current doses  -Check echocardiogram prior to discharge to assess myocardial and valvular function; on schedule for today.    Kenneth Malone M.D.  [id number]    Pilo Ortega M.D.  Cardiology Consult Service  [phone] or [phone]

## 2017-12-10 ENCOUNTER — INPATIENT (INPATIENT)
Facility: HOSPITAL | Age: 27
LOS: 3 days | Discharge: HOME | DRG: 776 | End: 2017-12-14
Attending: OBSTETRICS & GYNECOLOGY | Admitting: OBSTETRICS & GYNECOLOGY
Payer: COMMERCIAL

## 2017-12-10 VITALS
OXYGEN SATURATION: 98 % | SYSTOLIC BLOOD PRESSURE: 190 MMHG | HEART RATE: 86 BPM | TEMPERATURE: 99 F | DIASTOLIC BLOOD PRESSURE: 146 MMHG | RESPIRATION RATE: 20 BRPM

## 2017-12-10 DIAGNOSIS — O14.90 UNSPECIFIED PRE-ECLAMPSIA, UNSPECIFIED TRIMESTER: ICD-10-CM

## 2017-12-10 DIAGNOSIS — F41.9 ANXIETY DISORDER, UNSPECIFIED: ICD-10-CM

## 2017-12-10 DIAGNOSIS — Z98.82 BREAST IMPLANT STATUS: Chronic | ICD-10-CM

## 2017-12-10 DIAGNOSIS — Z29.9 ENCOUNTER FOR PROPHYLACTIC MEASURES, UNSPECIFIED: ICD-10-CM

## 2017-12-10 LAB
ALBUMIN SERPL ELPH-MCNC: 4.2 G/DL — SIGNIFICANT CHANGE UP (ref 3.3–5)
ALP SERPL-CCNC: 132 U/L — HIGH (ref 40–120)
ALT FLD-CCNC: 38 U/L RC — SIGNIFICANT CHANGE UP (ref 10–45)
ANION GAP SERPL CALC-SCNC: 16 MMOL/L — SIGNIFICANT CHANGE UP (ref 5–17)
APTT BLD: 34.3 SEC — SIGNIFICANT CHANGE UP (ref 27.5–37.4)
AST SERPL-CCNC: 21 U/L — SIGNIFICANT CHANGE UP (ref 10–40)
BASOPHILS # BLD AUTO: 0 K/UL — SIGNIFICANT CHANGE UP (ref 0–0.2)
BASOPHILS NFR BLD AUTO: 0.6 % — SIGNIFICANT CHANGE UP (ref 0–2)
BILIRUB SERPL-MCNC: 0.2 MG/DL — SIGNIFICANT CHANGE UP (ref 0.2–1.2)
BLD GP AB SCN SERPL QL: NEGATIVE — SIGNIFICANT CHANGE UP
BUN SERPL-MCNC: 11 MG/DL — SIGNIFICANT CHANGE UP (ref 7–23)
CALCIUM SERPL-MCNC: 9.7 MG/DL — SIGNIFICANT CHANGE UP (ref 8.4–10.5)
CHLORIDE SERPL-SCNC: 98 MMOL/L — SIGNIFICANT CHANGE UP (ref 96–108)
CO2 SERPL-SCNC: 22 MMOL/L — SIGNIFICANT CHANGE UP (ref 22–31)
CREAT SERPL-MCNC: 0.6 MG/DL — SIGNIFICANT CHANGE UP (ref 0.5–1.3)
EOSINOPHIL # BLD AUTO: 0.1 K/UL — SIGNIFICANT CHANGE UP (ref 0–0.5)
EOSINOPHIL NFR BLD AUTO: 1.7 % — SIGNIFICANT CHANGE UP (ref 0–6)
GLUCOSE SERPL-MCNC: 90 MG/DL — SIGNIFICANT CHANGE UP (ref 70–99)
HCT VFR BLD CALC: 40.9 % — SIGNIFICANT CHANGE UP (ref 34.5–45)
HGB BLD-MCNC: 14.5 G/DL — SIGNIFICANT CHANGE UP (ref 11.5–15.5)
INR BLD: 1.06 RATIO — SIGNIFICANT CHANGE UP (ref 0.88–1.16)
LDH SERPL L TO P-CCNC: 300 U/L — HIGH (ref 50–242)
LYMPHOCYTES # BLD AUTO: 1.7 K/UL — SIGNIFICANT CHANGE UP (ref 1–3.3)
LYMPHOCYTES # BLD AUTO: 23.2 % — SIGNIFICANT CHANGE UP (ref 13–44)
MAGNESIUM SERPL-MCNC: 2 MG/DL — SIGNIFICANT CHANGE UP (ref 1.6–2.6)
MCHC RBC-ENTMCNC: 31.2 PG — SIGNIFICANT CHANGE UP (ref 27–34)
MCHC RBC-ENTMCNC: 35.3 GM/DL — SIGNIFICANT CHANGE UP (ref 32–36)
MCV RBC AUTO: 88.3 FL — SIGNIFICANT CHANGE UP (ref 80–100)
MONOCYTES # BLD AUTO: 0.5 K/UL — SIGNIFICANT CHANGE UP (ref 0–0.9)
MONOCYTES NFR BLD AUTO: 7.4 % — SIGNIFICANT CHANGE UP (ref 2–14)
NEUTROPHILS # BLD AUTO: 4.9 K/UL — SIGNIFICANT CHANGE UP (ref 1.8–7.4)
NEUTROPHILS NFR BLD AUTO: 67.1 % — SIGNIFICANT CHANGE UP (ref 43–77)
PHOSPHATE SERPL-MCNC: 3.6 MG/DL — SIGNIFICANT CHANGE UP (ref 2.5–4.5)
PLATELET # BLD AUTO: 378 K/UL — SIGNIFICANT CHANGE UP (ref 150–400)
POTASSIUM SERPL-MCNC: 4 MMOL/L — SIGNIFICANT CHANGE UP (ref 3.5–5.3)
POTASSIUM SERPL-SCNC: 4 MMOL/L — SIGNIFICANT CHANGE UP (ref 3.5–5.3)
PROT SERPL-MCNC: 7.5 G/DL — SIGNIFICANT CHANGE UP (ref 6–8.3)
PROTHROM AB SERPL-ACNC: 11.6 SEC — SIGNIFICANT CHANGE UP (ref 9.8–12.7)
RBC # BLD: 4.64 M/UL — SIGNIFICANT CHANGE UP (ref 3.8–5.2)
RBC # FLD: 10.9 % — SIGNIFICANT CHANGE UP (ref 10.3–14.5)
RH IG SCN BLD-IMP: POSITIVE — SIGNIFICANT CHANGE UP
SODIUM SERPL-SCNC: 136 MMOL/L — SIGNIFICANT CHANGE UP (ref 135–145)
URATE SERPL-MCNC: 3.6 MG/DL — SIGNIFICANT CHANGE UP (ref 2.5–7)
WBC # BLD: 7.3 K/UL — SIGNIFICANT CHANGE UP (ref 3.8–10.5)
WBC # FLD AUTO: 7.3 K/UL — SIGNIFICANT CHANGE UP (ref 3.8–10.5)

## 2017-12-10 PROCEDURE — 99223 1ST HOSP IP/OBS HIGH 75: CPT

## 2017-12-10 PROCEDURE — 70450 CT HEAD/BRAIN W/O DYE: CPT | Mod: 26

## 2017-12-10 PROCEDURE — 93010 ELECTROCARDIOGRAM REPORT: CPT

## 2017-12-10 PROCEDURE — 99291 CRITICAL CARE FIRST HOUR: CPT | Mod: 25

## 2017-12-10 RX ORDER — LABETALOL HCL 100 MG
300 TABLET ORAL EVERY 8 HOURS
Qty: 0 | Refills: 0 | Status: DISCONTINUED | OUTPATIENT
Start: 2017-12-10 | End: 2017-12-10

## 2017-12-10 RX ORDER — HYDRALAZINE HCL 50 MG
25 TABLET ORAL EVERY 6 HOURS
Qty: 0 | Refills: 0 | Status: DISCONTINUED | OUTPATIENT
Start: 2017-12-10 | End: 2017-12-12

## 2017-12-10 RX ORDER — HYDRALAZINE HCL 50 MG
10 TABLET ORAL ONCE
Qty: 0 | Refills: 0 | Status: COMPLETED | OUTPATIENT
Start: 2017-12-10 | End: 2017-12-10

## 2017-12-10 RX ORDER — ACETAMINOPHEN 500 MG
1000 TABLET ORAL ONCE
Qty: 0 | Refills: 0 | Status: COMPLETED | OUTPATIENT
Start: 2017-12-10 | End: 2017-12-10

## 2017-12-10 RX ORDER — MAGNESIUM SULFATE 500 MG/ML
4 VIAL (ML) INJECTION ONCE
Qty: 0 | Refills: 0 | Status: DISCONTINUED | OUTPATIENT
Start: 2017-12-10 | End: 2017-12-10

## 2017-12-10 RX ORDER — NIFEDIPINE 30 MG
60 TABLET, EXTENDED RELEASE 24 HR ORAL DAILY
Qty: 0 | Refills: 0 | Status: DISCONTINUED | OUTPATIENT
Start: 2017-12-11 | End: 2017-12-14

## 2017-12-10 RX ORDER — ALPRAZOLAM 0.25 MG
0.5 TABLET ORAL ONCE
Qty: 0 | Refills: 0 | Status: DISCONTINUED | OUTPATIENT
Start: 2017-12-10 | End: 2017-12-10

## 2017-12-10 RX ORDER — HYDRALAZINE HCL 50 MG
5 TABLET ORAL ONCE
Qty: 0 | Refills: 0 | Status: COMPLETED | OUTPATIENT
Start: 2017-12-10 | End: 2017-12-10

## 2017-12-10 RX ORDER — FAMOTIDINE 10 MG/ML
20 INJECTION INTRAVENOUS DAILY
Qty: 0 | Refills: 0 | Status: DISCONTINUED | OUTPATIENT
Start: 2017-12-10 | End: 2017-12-14

## 2017-12-10 RX ORDER — ONDANSETRON 8 MG/1
4 TABLET, FILM COATED ORAL ONCE
Qty: 0 | Refills: 0 | Status: COMPLETED | OUTPATIENT
Start: 2017-12-10 | End: 2017-12-10

## 2017-12-10 RX ORDER — NIFEDIPINE 30 MG
30 TABLET, EXTENDED RELEASE 24 HR ORAL ONCE
Qty: 0 | Refills: 0 | Status: COMPLETED | OUTPATIENT
Start: 2017-12-10 | End: 2017-12-10

## 2017-12-10 RX ORDER — ALPRAZOLAM 0.25 MG
1 TABLET ORAL DAILY
Qty: 0 | Refills: 0 | Status: DISCONTINUED | OUTPATIENT
Start: 2017-12-10 | End: 2017-12-11

## 2017-12-10 RX ORDER — MAGNESIUM SULFATE 500 MG/ML
2 VIAL (ML) INJECTION
Qty: 40 | Refills: 0 | Status: DISCONTINUED | OUTPATIENT
Start: 2017-12-10 | End: 2017-12-10

## 2017-12-10 RX ADMIN — Medication 10 MILLIGRAM(S): at 17:53

## 2017-12-10 RX ADMIN — Medication 5 MILLIGRAM(S): at 18:15

## 2017-12-10 RX ADMIN — Medication 400 MILLIGRAM(S): at 18:30

## 2017-12-10 RX ADMIN — ONDANSETRON 4 MILLIGRAM(S): 8 TABLET, FILM COATED ORAL at 17:53

## 2017-12-10 RX ADMIN — FAMOTIDINE 20 MILLIGRAM(S): 10 INJECTION INTRAVENOUS at 21:44

## 2017-12-10 RX ADMIN — ONDANSETRON 4 MILLIGRAM(S): 8 TABLET, FILM COATED ORAL at 22:36

## 2017-12-10 RX ADMIN — Medication 1000 MILLIGRAM(S): at 18:52

## 2017-12-10 RX ADMIN — Medication 300 MILLIGRAM(S): at 22:02

## 2017-12-10 RX ADMIN — Medication 0.5 MILLIGRAM(S): at 18:57

## 2017-12-10 RX ADMIN — Medication 30 MILLIGRAM(S): at 21:44

## 2017-12-10 RX ADMIN — Medication 30 MILLIGRAM(S): at 18:32

## 2017-12-10 NOTE — ED PROVIDER NOTE - ATTENDING CONTRIBUTION TO CARE
attending Kevin: 27yF  s/p vaginal delivery at 41 weeks gestation on  complicated by postpartum preeclampsia currently on procardia and xanax presents with HTN at home and headache and chest pain. On arrival, BP 200s/120s, awake and alert complaining of headache, neuro intact. OB at bedside. Pt received magnesium 2 days ago, OB does not want to redose. Will obtain IV access, HELLP labs, hydralazine and procardia for BP control, head CT given severe HA, close BP monitoring, ekg and OB admission. attending Kevin: 27yF  s/p vaginal delivery at 41 weeks gestation on  complicated by postpartum preeclampsia currently on procardia and xanax presents with HTN at home and headache and chest pain. On arrival, BP 200s/120s, awake and alert complaining of headache, neuro intact. OB at bedside. Pt received 2nd round of magnesium 4 days ago, OB does not want to redose. Will obtain IV access, HELLP labs, hydralazine and procardia for BP control, head CT given severe HA, close BP monitoring, ekg and OB admission.

## 2017-12-10 NOTE — ED PROVIDER NOTE - OBJECTIVE STATEMENT
26 y/o  s/p VAVD on  complicated by postpartum preeclampsia.  Patient was discharged yesterday in stable condition. This is the third episode of hypertension postpartum. 28 y/o  s/p VAVD on  complicated by postpartum preeclampsia.  Patient was discharged yesterday in stable condition, given procardia and labetalol for blood pressure control for cardiology. This is the third episode of hypertension postpartum. Patient was complaining of dyspnea at rest, associated with headache, blurry vision, nausea without vomiting, and chest discomfort starting this morning, patient checked BP, as high as . No peripheral edema, no hx of seizures.

## 2017-12-10 NOTE — CONSULT NOTE ADULT - ASSESSMENT
27F  PPD#1 sp VAVD on  c/b postpartum preeclampsia presenting today with postpartum preeclampsia w/ bp to 200/110s, with headaches, blurry vision, and dyspnea. CTH negative.   - BP improved sp hydralazine 10 IVP. Would cont with labetalol 300mg TID and increase procardial XL to 60mg QD starting tomw. Goal bp <150/90  - consider starting asa 81    will cont to follow 27F  PPD#1 sp VAVD on  c/b postpartum preeclampsia presenting today with postpartum preeclampsia w/ bp to 200/110s, with headaches, blurry vision, and dyspnea. CTH negative.

## 2017-12-10 NOTE — ED ADULT TRIAGE NOTE - CHIEF COMPLAINT QUOTE
9 Days postpartum, Hx eclampsia, d/c yesterday on Procardia and Labetalol, gradual onset of headache at home, took BP and it was 190/110. Also c/o mouth dryness, chest discomfort, nausea, no vomiting,

## 2017-12-10 NOTE — ED PROVIDER NOTE - CRITICAL CARE PROVIDED
consultation with other physicians/additional history taking/consult w/ pt's family directly relating to pts condition/interpretation of diagnostic studies/direct patient care (not related to procedure)/documentation

## 2017-12-10 NOTE — CONSULT NOTE ADULT - PROBLEM SELECTOR RECOMMENDATION 9
D/C labetolol, patient states she feel nauseous and dizzy after she takes it.  Start hydralazine 25mg po q6hrly, monitor blood pressure. Goal BP over next 24hrs should be 'smmHG.  Continue procardia xl 60mg po daily.   Patient's private cardiologist is Dr. Kt Mehta. D/C labetolol, patient states she feel nauseous and dizzy after she takes it.  Start hydralazine 25mg po q6hrly, monitor blood pressure. Goal BP over next 24hrs should be 'smmHG.  Continue procardia xl 60mg po daily.   Repeat EKG in am.   Patient's private cardiologist is Dr. Kt Mehta.

## 2017-12-10 NOTE — H&P ADULT - ASSESSMENT
28 y/o  PPD#1 s/p VAVD on  complicated by postpartum preeclampsia (s/p Mg 12/3-) with readmission on 10/5 for postpartum PEC (s/p Mg -), presents with uncontrolled HTN.

## 2017-12-10 NOTE — ED ADULT NURSE REASSESSMENT NOTE - NS ED NURSE REASSESS COMMENT FT1
patient received from previous RN. patient observed resting in bed. pateint is axo3. no indication of pain or discomfort at this time  VS reassessed. VS are stable.   plan of care reviewed with patient, patient is awaiting inpatient bed

## 2017-12-10 NOTE — H&P ADULT - NSHPPHYSICALEXAM_GEN_ALL_CORE
Gen: NAD. AAOx3  CV: Bradycardic to 56bpm.  Normal S1 and S2.  No murmurs.  Pulm: CTAB.  Unlabored.  Abd: Soft, nondistended.  No rebound or guarding.  No RUQ tenderness.  +Mild epigastric tenderness. +bowel sounds  Ext: NT bilaterally.  No LE edema bilaterally.  2+ patellar DTR bilaterally

## 2017-12-10 NOTE — ED PROVIDER NOTE - PROGRESS NOTE DETAILS
Spoke with Ob/Gyn, will hold off on magnesium as this is third episode with magnesium, will use hydralazine primarily and provide home dose of procardia. Will provide another 10mg of hydralazine (25mg total) if BP is over 160/110 again. (now 140s/90s)  - Wilfredo Solomon MD Patient feeling improved, BP now in the normal range  - 121/76, without additional medications.

## 2017-12-10 NOTE — H&P ADULT - PROBLEM SELECTOR PLAN 1
1) Neuro: HA: follow up CT Head.  Tylenol 975mg PRN q 6 hours                       s/p Mg on 12/5-12/6.  Would not re-Mag.  2) CV: HTN: s/p IV Hydral 10mg + 5mg.  Continue Procardia 30mg XL qd and Labetalol 300mg TID for maintanence.  Titrate up as needed to attain goal /90s.  Monitor VS and I/Os.   Cardiology consult.  3) GI: Regular diet.  GI reflux: pepcid 20mg daily  4) Pulm: O2 sats stable.  5) Heme: DVT prophylaxis: Lovenox 40mg daily.  Ambulation  6) Psych: Anxiety: continue Xanax    d/w Dr. Thuan Hargrove, PGY4 1) Neuro: HA: follow up CT Head.  Tylenol 975mg PRN q 6 hours                       s/p Mg on 12/5-12/6.  Would not re-Mag.  2) CV: HTN: s/p IV Hydral 10mg + 5mg.  Continue Procardia 30mg XL qd and Labetalol 300mg TID for maintanence.  Titrate up as needed to attain goal /90s.  Monitor VS and I/Os.   Follow up HELLP labs.  Cardiology consult.  3) GI: Regular diet.  GI reflux: pepcid 20mg daily  4) Pulm: O2 sats stable.  5) Heme: DVT prophylaxis: Lovenox 40mg daily.  Ambulation  6) Psych: Anxiety: continue Xanax    d/w Dr. Thuan Hargrove, PGY4

## 2017-12-10 NOTE — CONSULT NOTE ADULT - SUBJECTIVE AND OBJECTIVE BOX
CHIEF COMPLAINT: postpartum preeclampsia     HISTORY OF PRESENT ILLNESS:  27F  PPD#1 sp VAVD on  c/b postaprtum preeclampsia presenting today with severe bp to 200/110s, with headaches, blurry vision, and dyspnea. HA and blurry vision started 3 hours prior to arrival to ED     Allergies    No Known Allergies    Intolerances    	    MEDICATIONS:  labetalol 300 milliGRAM(s) Oral every 8 hours        ondansetron Injectable 4 milliGRAM(s) IV Push once    famotidine    Tablet 20 milliGRAM(s) Oral daily      prenatal multivitamin 1 Tablet(s) Oral daily      PAST MEDICAL & SURGICAL HISTORY:  Anxiety  Obsessive-compulsive disorder, unspecified type  Eclampsia  H/O breast augmentation      FAMILY HISTORY:  No pertinent family history in first degree relatives      SOCIAL HISTORY:    [ ] Non-smoker  [ ] Smoker  [ ] Alcohol      REVIEW OF SYSTEMS:  General: no fatigue/malaise, weight loss/gain.  Skin: no rashes.  Ophthalmologic: no blurred vision, no loss of vision. 	  ENT: no sore throat, rhinorrhea, sinus congestion.  Respiratory: no SOB, cough or wheeze.  Gastrointestinal:  no N/V/D, no melena/hematemesis/hematochezia.  Genitourinary: no dysuria/hesitancy or hematuria.  Musculoskeletal: no myalgias or arthralgias.  Neurological: no changes in vision or hearing, no lightheadedness/dizziness, no syncope/near syncope	  Psychiatric: no unusual stress/anxiety.   Hematology/Lymphatics: no unusual bleeding, bruising and no lymphadenopathy.  Endocrine: no unusual thirst.   All others negative except as stated above and in HPI.    PHYSICAL EXAM:  T(C): 37.1 (12-10-17 @ 17:36), Max: 37.1 (12-10-17 @ 17:36)  HR: 93 (12-10-17 @ 19:33) (60 - 93)  BP: 125/74 (12-10-17 @ 19:33) (121/76 - 209/119)  RR: 20 (12-10-17 @ 19:33) (17 - 20)  SpO2: 99% (12-10-17 @ 19:33) (98% - 100%)  Wt(kg): --  I&O's Summary      Appearance: Normal	  HEENT:   Normal oral mucosa, PERRL, EOMI	  Lymphatic: No lymphadenopathy  Cardiovascular: Normal S1 S2, No JVD, No murmurs, No edema  Respiratory: Lungs clear to auscultation	  Psychiatry: A & O x 3, Mood & affect appropriate  Gastrointestinal:  Soft, Non-tender, + BS	  Skin: No rashes, No ecchymoses, No cyanosis	  Neurologic: Non-focal  Extremities: Normal range of motion, No clubbing, cyanosis or edema  Vascular: Peripheral pulses palpable 2+ bilaterally        LABS:	 	    CBC Full  -  ( 10 Dec 2017 18:18 )  WBC Count : 7.3 K/uL  Hemoglobin : 14.5 g/dL  Hematocrit : 40.9 %  Platelet Count - Automated : 378 K/uL  Mean Cell Volume : 88.3 fl  Mean Cell Hemoglobin : 31.2 pg  Mean Cell Hemoglobin Concentration : 35.3 gm/dL  Auto Neutrophil # : 4.9 K/uL  Auto Lymphocyte # : 1.7 K/uL  Auto Monocyte # : 0.5 K/uL  Auto Eosinophil # : 0.1 K/uL  Auto Basophil # : 0.0 K/uL  Auto Neutrophil % : 67.1 %  Auto Lymphocyte % : 23.2 %  Auto Monocyte % : 7.4 %  Auto Eosinophil % : 1.7 %  Auto Basophil % : 0.6 %    12-10    136  |  98  |  11  ----------------------------<  90  4.0   |  22  |  0.60    Ca    9.7      10 Dec 2017 18:18  Phos  3.6     12-10  Mg     2.0     12-10    TPro  7.5  /  Alb  4.2  /  TBili  0.2  /  DBili  x   /  AST  21  /  ALT  38  /  AlkPhos  132<H>  12-10 CHIEF COMPLAINT: postpartum preeclampsia     HISTORY OF PRESENT ILLNESS:  27F  PPD#1 sp VAVD on  c/b postaprtum preeclampsia presenting today with severe bp to 200/110s, with headaches, blurry vision, and dyspnea. States normally checks her bp q8h at home, and has been sbp in lucie 110s until today when she developed HA and blurry vision started 3 hours prior to arrival to ED and when checked at home was 200/110s. No recent NSAID use, no high salt intake. Has been compliant with medications. REcently started on nitrofurantoin for UTI,. Not breast feeding. Was recently discharged for also postpartum preeclampsia on labetalol 300mg TID and procardial 30mg XL qd. NO hx of miscarriages.     On Arrival to /146 HR 86, started on magnesium infusion, and received hydralazine 120mg IVP. BP responded to 140s/90s. CTH done showed no acute intracranial process.    Allergies    No Known Allergies    Intolerances    	    MEDICATIONS:  labetalol 300 milliGRAM(s) Oral every 8 hours        ondansetron Injectable 4 milliGRAM(s) IV Push once    famotidine    Tablet 20 milliGRAM(s) Oral daily      prenatal multivitamin 1 Tablet(s) Oral daily      PAST MEDICAL & SURGICAL HISTORY:  Anxiety  Obsessive-compulsive disorder, unspecified type  Eclampsia  H/O breast augmentation      FAMILY HISTORY:  No pertinent family history in first degree relatives      SOCIAL HISTORY:    [ ] Non-smoker  no recreational drug use        REVIEW OF SYSTEMS:  General: no fatigue/malaise, weight loss/gain.  Skin: no rashes.  Ophthalmologic: no blurred vision, no loss of vision. 	  ENT: no sore throat, rhinorrhea, sinus congestion.  Respiratory: no SOB, cough or wheeze.  Gastrointestinal:  no N/V/D, no melena/hematemesis/hematochezia.  Genitourinary: no dysuria/hesitancy or hematuria.  Musculoskeletal: no myalgias or arthralgias.  Neurological: no changes in vision or hearing, no lightheadedness/dizziness, no syncope/near syncope	  Psychiatric: no unusual stress/anxiety.   Hematology/Lymphatics: no unusual bleeding, bruising and no lymphadenopathy.  Endocrine: no unusual thirst.   All others negative except as stated above and in HPI.    PHYSICAL EXAM:  T(C): 37.1 (12-10-17 @ 17:36), Max: 37.1 (12-10-17 @ 17:36)  HR: 93 (12-10-17 @ 19:33) (60 - 93)  BP: 125/74 (12-10-17 @ 19:33) (121/76 - 209/119)  RR: 20 (12-10-17 @ 19:33) (17 - 20)  SpO2: 99% (12-10-17 @ 19:33) (98% - 100%)  Wt(kg): --  I&O's Summary      Appearance: Normal	  HEENT:   Normal oral mucosa, PERRL, EOMI	  Lymphatic: No lymphadenopathy  Cardiovascular: Normal S1 S2, No JVD, No murmurs, No edema  Respiratory: Lungs clear to auscultation	  Psychiatry: A & O x 3, Mood & affect appropriate  Gastrointestinal:  Soft, Non-tender, + BS	  Skin: No rashes, No ecchymoses, No cyanosis	  Neurologic: Non-focal  Extremities: Normal range of motion, No clubbing, cyanosis or edema  Vascular: Peripheral pulses palpable 2+ bilaterally        LABS:	 	    CBC Full  -  ( 10 Dec 2017 18:18 )  WBC Count : 7.3 K/uL  Hemoglobin : 14.5 g/dL  Hematocrit : 40.9 %  Platelet Count - Automated : 378 K/uL  Mean Cell Volume : 88.3 fl  Mean Cell Hemoglobin : 31.2 pg  Mean Cell Hemoglobin Concentration : 35.3 gm/dL  Auto Neutrophil # : 4.9 K/uL  Auto Lymphocyte # : 1.7 K/uL  Auto Monocyte # : 0.5 K/uL  Auto Eosinophil # : 0.1 K/uL  Auto Basophil # : 0.0 K/uL  Auto Neutrophil % : 67.1 %  Auto Lymphocyte % : 23.2 %  Auto Monocyte % : 7.4 %  Auto Eosinophil % : 1.7 %  Auto Basophil % : 0.6 %    12-10    136  |  98  |  11  ----------------------------<  90  4.0   |  22  |  0.60    Ca    9.7      10 Dec 2017 18:18  Phos  3.6     12-10  Mg     2.0     12-10    TPro  7.5  /  Alb  4.2  /  TBili  0.2  /  DBili  x   /  AST  21  /  ALT  38  /  AlkPhos  132<H>  12-10    < from: Transthoracic Echocardiogram (17 @ 18:29) >  Dimensions:    Normal Values:  LA:     2.8    2.0 - 4.0 cm  Ao:     2.4    2.0 - 3.8 cm  SEPTUM: 0.7    0.6 - 1.2 cm  PWT:    0.7    0.6 - 1.1 cm  LVIDd:  4.3    3.0 - 5.6 cm  LVIDs:  2.8    1.8 - 4.0 cm  Derived variables:  LVMI: 55 g/m2  RWT: 0.32  Fractional short: 35 %  EF (Visual Estimate): 60-65 %  ------------------------------------------------------------------------  Observations:  Mitral Valve: Normal mitral valve. Minimal mitral  regurgitation.  Aortic Valve/Aorta: Aortic valve not well visualized;  probably normal. No aortic valve regurgitation seen.  Normal aortic root size. (Ao: 2.4 cm at the sinuses of  Valsalva).  Left Atrium: Normal left atrium.  LA volume index = 21  cc/m2.  Left Ventricle: Normal left ventricular systolic function.  No segmental wall motion abnormalities. LVEF 60-65%.  Normal left ventricular internal dimensions and wall  thicknesses. Normal diastolic function  Right Heart: Normal right atrium. Normal right ventricular  size and function. Grossly normal tricuspid valve. Minimal  tricuspid regurgitation. Pulmonic valve not well  visualized, probably normal. Minimal pulmonic  regurgitation.  Pericardium/Pleura: No pericardial effusion seen.  Hemodynamic: Estimated right atrial pressure is 8 mm Hg.  Estimated right ventricular systolic pressure equals 29 mm  Hg, assuming right atrial pressure equals 8 mm Hg,  consistent with normal pulmonary pressures.    < end of copied text >    ekg sinus bradycardia 49 nl axis/ intervals. no st cahnges CHIEF COMPLAINT: postpartum preeclampsia     HISTORY OF PRESENT ILLNESS:  27F  PPD#1 sp VAVD on  c/b postaprtum preeclampsia presenting today with severe bp to 200/110s, with headaches, blurry vision, and dyspnea. States normally checks her bp q8h at home, and has been sbp in lucie 110s until today when she developed HA and blurry vision started 3 hours prior to arrival to ED and when checked at home was 200/110s. No recent NSAID use, no high salt intake. Has been compliant with medications. REcently started on nitrofurantoin for UTI,. Not breast feeding. Was recently discharged for also postpartum preeclampsia on labetalol 300mg TID and procardial 30mg XL qd. NO hx of miscarriages.   Patient states that after each dose of labetalol she feels nauseous and dizzy.     On Arrival to /146 HR 86, started on magnesium infusion, and received hydralazine 120mg IVP. BP responded to 140s/90s. CTH done showed no acute intracranial process.    Allergies  No Known Allergies    MEDICATIONS:  labetalol 300 milliGRAM(s) Oral every 8 hours  ondansetron Injectable 4 milliGRAM(s) IV Push once  famotidine    Tablet 20 milliGRAM(s) Oral daily  prenatal multivitamin 1 Tablet(s) Oral daily    PAST MEDICAL & SURGICAL HISTORY:  Anxiety  Obsessive-compulsive disorder, unspecified type  Eclampsia  H/O breast augmentation    FAMILY HISTORY:  No pertinent family history in first degree relatives    SOCIAL HISTORY:    [ ] Non-smoker  no recreational drug use    REVIEW OF SYSTEMS:  General: no fatigue/malaise, weight loss/gain.  Skin: no rashes.  Ophthalmologic: no blurred vision, no loss of vision. 	  ENT: no sore throat, rhinorrhea, sinus congestion.  Respiratory: no SOB, cough or wheeze.  Gastrointestinal:  no N/V/D, no melena/hematemesis/hematochezia.  Genitourinary: no dysuria/hesitancy or hematuria.  Musculoskeletal: no myalgias or arthralgias.  Neurological: no changes in vision or hearing, no lightheadedness/dizziness, no syncope/near syncope	  Psychiatric: no unusual stress/anxiety.   Hematology/Lymphatics: no unusual bleeding, bruising and no lymphadenopathy.  Endocrine: no unusual thirst.   All others negative except as stated above and in HPI.    PHYSICAL EXAM:  T(C): 37.1 (12-10-17 @ 17:36), Max: 37.1 (12-10-17 @ 17:36)  HR: 93 (12-10-17 @ 19:33) (60 - 93)  BP: 125/74 (12-10-17 @ 19:33) (121/76 - 209/119)  RR: 20 (12-10-17 @ 19:33) (17 - 20)  SpO2: 99% (12-10-17 @ 19:33) (98% - 100%)  Wt(kg): --  I&O's Summary    Appearance: Normal	  HEENT:   Normal oral mucosa, PERRL, EOMI	  Lymphatic: No lymphadenopathy  Cardiovascular: Normal S1 S2, No JVD, No murmurs, No edema  Respiratory: Lungs clear to auscultation	  Psychiatry: A & O x 3, Mood & affect appropriate  Gastrointestinal:  Soft, Non-tender, + BS	  Skin: No rashes, No ecchymoses, No cyanosis	  Neurologic: Non-focal  Extremities: Normal range of motion, No clubbing, cyanosis or edema  Vascular: Peripheral pulses palpable 2+ bilaterally    LABS:	 	    CBC Full  -  ( 10 Dec 2017 18:18 )  WBC Count : 7.3 K/uL  Hemoglobin : 14.5 g/dL  Hematocrit : 40.9 %  Platelet Count - Automated : 378 K/uL  Mean Cell Volume : 88.3 fl  Mean Cell Hemoglobin : 31.2 pg  Mean Cell Hemoglobin Concentration : 35.3 gm/dL  Auto Neutrophil # : 4.9 K/uL  Auto Lymphocyte # : 1.7 K/uL  Auto Monocyte # : 0.5 K/uL  Auto Eosinophil # : 0.1 K/uL  Auto Basophil # : 0.0 K/uL  Auto Neutrophil % : 67.1 %  Auto Lymphocyte % : 23.2 %  Auto Monocyte % : 7.4 %  Auto Eosinophil % : 1.7 %  Auto Basophil % : 0.6 %    12-10    136  |  98  |  11  ----------------------------<  90  4.0   |  22  |  0.60    Ca    9.7      10 Dec 2017 18:18  Phos  3.6     12-10  Mg     2.0     12-10    TPro  7.5  /  Alb  4.2  /  TBili  0.2  /  DBili  x   /  AST  21  /  ALT  38  /  AlkPhos  132<H>  12-10    EKG: I reviewed it myself, normal sinus rhythm  < from: Transthoracic Echocardiogram (17 @ 18:29) >  Dimensions:    Normal Values:  LA:     2.8    2.0 - 4.0 cm  Ao:     2.4    2.0 - 3.8 cm  SEPTUM: 0.7    0.6 - 1.2 cm  PWT:    0.7    0.6 - 1.1 cm  LVIDd:  4.3    3.0 - 5.6 cm  LVIDs:  2.8    1.8 - 4.0 cm  Derived variables:  LVMI: 55 g/m2  RWT: 0.32  Fractional short: 35 %  EF (Visual Estimate): 60-65 %  ------------------------------------------------------------------------  Observations:  Mitral Valve: Normal mitral valve. Minimal mitral  regurgitation.  Aortic Valve/Aorta: Aortic valve not well visualized;  probably normal. No aortic valve regurgitation seen.  Normal aortic root size. (Ao: 2.4 cm at the sinuses of  Valsalva).  Left Atrium: Normal left atrium.  LA volume index = 21  cc/m2.  Left Ventricle: Normal left ventricular systolic function.  No segmental wall motion abnormalities. LVEF 60-65%.  Normal left ventricular internal dimensions and wall  thicknesses. Normal diastolic function  Right Heart: Normal right atrium. Normal right ventricular  size and function. Grossly normal tricuspid valve. Minimal  tricuspid regurgitation. Pulmonic valve not well  visualized, probably normal. Minimal pulmonic  regurgitation.  Pericardium/Pleura: No pericardial effusion seen.  Hemodynamic: Estimated right atrial pressure is 8 mm Hg.  Estimated right ventricular systolic pressure equals 29 mm  Hg, assuming right atrial pressure equals 8 mm Hg,  consistent with normal pulmonary pressures.    < end of copied text >    ekg sinus bradycardia 49 nl axis/ intervals. no st cahnges CHIEF COMPLAINT: postpartum preeclampsia     HISTORY OF PRESENT ILLNESS:  27F  PPD#1 sp VAVD on  c/b postaprtum preeclampsia presenting today with severe bp to 200/110s, with headaches, blurry vision, and dyspnea. States normally checks her bp q8h at home, and has been sbp in lucie 110s until today when she developed HA and blurry vision started 3 hours prior to arrival to ED and when checked at home was 200/110s. No recent NSAID use, no high salt intake. Has been compliant with medications. REcently started on nitrofurantoin for UTI,. Not breast feeding. Was recently discharged for also postpartum preeclampsia on labetalol 300mg TID and procardial 30mg XL qd. NO hx of miscarriages.   Patient states that after each dose of labetalol she feels nauseous and dizzy.     On Arrival to /146 HR 86, started on magnesium infusion, and received hydralazine 120mg IVP. BP responded to 140s/90s. CTH done showed no acute intracranial process.    Allergies  No Known Allergies    MEDICATIONS:  labetalol 300 milliGRAM(s) Oral every 8 hours  ondansetron Injectable 4 milliGRAM(s) IV Push once  famotidine    Tablet 20 milliGRAM(s) Oral daily  prenatal multivitamin 1 Tablet(s) Oral daily    PAST MEDICAL & SURGICAL HISTORY:  Anxiety  Obsessive-compulsive disorder, unspecified type  Eclampsia  H/O breast augmentation    FAMILY HISTORY:  No pertinent family history in first degree relatives    SOCIAL HISTORY:    [ ] Non-smoker  no recreational drug use    REVIEW OF SYSTEMS:  General: no fatigue/malaise, weight loss/gain.  Skin: no rashes.  Ophthalmologic: no blurred vision, no loss of vision. 	  ENT: no sore throat, rhinorrhea, sinus congestion.  Respiratory: no SOB, cough or wheeze.  Gastrointestinal:  no N/V/D, no melena/hematemesis/hematochezia.  Genitourinary: no dysuria/hesitancy or hematuria.  Musculoskeletal: no myalgias or arthralgias.  Neurological: no changes in vision or hearing, no lightheadedness/dizziness, no syncope/near syncope	  Psychiatric: no unusual stress/anxiety.   Hematology/Lymphatics: no unusual bleeding, bruising and no lymphadenopathy.  Endocrine: no unusual thirst.   All others negative except as stated above and in HPI.    PHYSICAL EXAM:  T(C): 37.1 (12-10-17 @ 17:36), Max: 37.1 (12-10-17 @ 17:36)  HR: 93 (12-10-17 @ 19:33) (60 - 93)  BP: 125/74 (12-10-17 @ 19:33) (121/76 - 209/119)  RR: 20 (12-10-17 @ 19:33) (17 - 20)  SpO2: 99% (12-10-17 @ 19:33) (98% - 100%)  Wt(kg): --  I&O's Summary    Appearance: Normal	  HEENT:   Normal oral mucosa, PERRL, EOMI	  Lymphatic: No lymphadenopathy  Cardiovascular: Normal S1 S2, No JVD, No murmurs, No edema  Respiratory: Lungs clear to auscultation	  Psychiatry: A & O x 3, Mood & affect appropriate  Gastrointestinal:  Soft, Non-tender, + BS	  Skin: No rashes, No ecchymoses, No cyanosis	  Neurologic: Non-focal  Extremities: Normal range of motion, No clubbing, cyanosis or edema  Vascular: Peripheral pulses palpable 2+ bilaterally    LABS:	 	    CBC Full  -  ( 10 Dec 2017 18:18 )  WBC Count : 7.3 K/uL  Hemoglobin : 14.5 g/dL  Hematocrit : 40.9 %  Platelet Count - Automated : 378 K/uL  Mean Cell Volume : 88.3 fl  Mean Cell Hemoglobin : 31.2 pg  Mean Cell Hemoglobin Concentration : 35.3 gm/dL  Auto Neutrophil # : 4.9 K/uL  Auto Lymphocyte # : 1.7 K/uL  Auto Monocyte # : 0.5 K/uL  Auto Eosinophil # : 0.1 K/uL  Auto Basophil # : 0.0 K/uL  Auto Neutrophil % : 67.1 %  Auto Lymphocyte % : 23.2 %  Auto Monocyte % : 7.4 %  Auto Eosinophil % : 1.7 %  Auto Basophil % : 0.6 %    12-10    136  |  98  |  11  ----------------------------<  90  4.0   |  22  |  0.60    Ca    9.7      10 Dec 2017 18:18  Phos  3.6     12-10  Mg     2.0     12-10    TPro  7.5  /  Alb  4.2  /  TBili  0.2  /  DBili  x   /  AST  21  /  ALT  38  /  AlkPhos  132<H>  12-10    EKG: I reviewed it myself, normal sinus rhythm  < from: Transthoracic Echocardiogram (17 @ 18:29) >  Dimensions:    Normal Values:  LA:     2.8    2.0 - 4.0 cm  Ao:     2.4    2.0 - 3.8 cm  SEPTUM: 0.7    0.6 - 1.2 cm  PWT:    0.7    0.6 - 1.1 cm  LVIDd:  4.3    3.0 - 5.6 cm  LVIDs:  2.8    1.8 - 4.0 cm  Derived variables:  LVMI: 55 g/m2  RWT: 0.32  Fractional short: 35 %  EF (Visual Estimate): 60-65 %  ------------------------------------------------------------------------  Observations:  Mitral Valve: Normal mitral valve. Minimal mitral  regurgitation.  Aortic Valve/Aorta: Aortic valve not well visualized;  probably normal. No aortic valve regurgitation seen.  Normal aortic root size. (Ao: 2.4 cm at the sinuses of  Valsalva).  Left Atrium: Normal left atrium.  LA volume index = 21  cc/m2.  Left Ventricle: Normal left ventricular systolic function.  No segmental wall motion abnormalities. LVEF 60-65%.  Normal left ventricular internal dimensions and wall  thicknesses. Normal diastolic function  Right Heart: Normal right atrium. Normal right ventricular  size and function. Grossly normal tricuspid valve. Minimal  tricuspid regurgitation. Pulmonic valve not well  visualized, probably normal. Minimal pulmonic  regurgitation.  Pericardium/Pleura: No pericardial effusion seen.  Hemodynamic: Estimated right atrial pressure is 8 mm Hg.  Estimated right ventricular systolic pressure equals 29 mm  Hg, assuming right atrial pressure equals 8 mm Hg,  consistent with normal pulmonary pressures.    < end of copied text >    ekg sinus bradycardia 49 nl axis/ intervals. t wave inversion anteroseptal leads.

## 2017-12-10 NOTE — ED PROVIDER NOTE - PHYSICAL EXAMINATION
PE: CONSTITUTIONAL: Nontoxic, appearing fatigued, in no apparent distress. ENMT: Airway patent, nasal mucosa clear, mouth with normal mucosa. HEAD: NCAT EYES: PERRL, EOMI bilaterally CARDIAC: RRR, no m/r/g, no pedal edema RESPIRATORY: CTA bilaterally, no adventitious sounds GI: Abdomen non-distended, non-tender MSK: Spine appears normal, range of motion is not limited, no muscle/joint tenderness NEURO: CNII-XII grossly intact, 5/5 strength, full sensation all extremities, gait intact SKIN: Skin tone normal in color, warm and dry. No evidence of rash.

## 2017-12-10 NOTE — H&P ADULT - HISTORY OF PRESENT ILLNESS
28 y/o  PPD#1 s/p VAVD on  complicated by postpartum preeclampsia (s/p Mg 12/3-) with readmission on 10/5 for postpartum PEC (s/p Mg -), presents with severe range BPs (200/110), HA, blurry vision, SOB.  Pt reports HA and blurry vision (bilateral) started 3 hours prior to arriving to ED.  Pt took BP an hour ago and was 190/110.  HA is posterior, pressure-like, 6/10 severity, improving without medications.  Pt reports nausea in the morning.  Denies any emesis.  Reports GI reflux.  Denies any chest pain, palpitations, RUQ pain, LE edema or pain.  +Lochia WNL.    Patient was discharged on to Labetalol 300mg TID and Procardia 30mg Xl qd.  Denies missing any doses.    Upon arrival to ED, /146mmHg, repeat 200/118mmHg.  Pt given IV Hydralzine 10mg then 5mg. BP improved to 142/91mmHg.      EKG (12/10) Sinus bradycardia     During last admission, patient was evaluated by Cardiology.  TTE (): WNL.  EF 60-65%    ObHx: (2017) eTOP with D&C  GynHx: denies  PMH: OCD, Anxiety (discontinued Lexapro on 2017)  PSH: D&C, breast augmentation  Meds: Labetalol 300mg TID, Procardia 30mg XL daily  All: NKDA  SH: denies alcohol, tobacco illicit drug use

## 2017-12-10 NOTE — ED ADULT NURSE NOTE - OBJECTIVE STATEMENT
27 y f came to the ed with htn and "not feeling well." patient delivered her first born 9 days ago. patient states having post partum htn but was controlled with magnesium before leaving the hospital. states she didn't feel well today took her bp at home and came to the ed when it was high. patient denies any chest pain, sob. c/o of nausea but denies vomiting. skin is warm and dry. denies any headache. c/o of spotty vision.

## 2017-12-11 DIAGNOSIS — I15.8 OTHER SECONDARY HYPERTENSION: ICD-10-CM

## 2017-12-11 LAB
APPEARANCE UR: CLEAR — SIGNIFICANT CHANGE UP
APTT BLD: 34.5 SEC — SIGNIFICANT CHANGE UP (ref 27.5–37.4)
BACTERIA # UR AUTO: ABNORMAL /HPF
BASOPHILS # BLD AUTO: 0 K/UL — SIGNIFICANT CHANGE UP (ref 0–0.2)
BASOPHILS NFR BLD AUTO: 0.6 % — SIGNIFICANT CHANGE UP (ref 0–2)
BILIRUB UR-MCNC: NEGATIVE — SIGNIFICANT CHANGE UP
CHLORIDE UR-SCNC: <20 MMOL/L — SIGNIFICANT CHANGE UP
COLOR SPEC: COLORLESS — SIGNIFICANT CHANGE UP
CORTIS AM PEAK SERPL-MCNC: 19.8 UG/DL — HIGH (ref 6–18.4)
CREAT ?TM UR-MCNC: 22 MG/DL — SIGNIFICANT CHANGE UP
DIFF PNL FLD: ABNORMAL
EOSINOPHIL # BLD AUTO: 0.1 K/UL — SIGNIFICANT CHANGE UP (ref 0–0.5)
EOSINOPHIL NFR BLD AUTO: 1.8 % — SIGNIFICANT CHANGE UP (ref 0–6)
FIBRINOGEN PPP-MCNC: 526 MG/DL — HIGH (ref 310–510)
GLUCOSE UR QL: NEGATIVE — SIGNIFICANT CHANGE UP
HCT VFR BLD CALC: 40.5 % — SIGNIFICANT CHANGE UP (ref 34.5–45)
HGB BLD-MCNC: 13.9 G/DL — SIGNIFICANT CHANGE UP (ref 11.5–15.5)
INR BLD: 1.1 RATIO — SIGNIFICANT CHANGE UP (ref 0.88–1.16)
KETONES UR-MCNC: NEGATIVE — SIGNIFICANT CHANGE UP
LEUKOCYTE ESTERASE UR-ACNC: ABNORMAL
LYMPHOCYTES # BLD AUTO: 1.4 K/UL — SIGNIFICANT CHANGE UP (ref 1–3.3)
LYMPHOCYTES # BLD AUTO: 21.7 % — SIGNIFICANT CHANGE UP (ref 13–44)
MCHC RBC-ENTMCNC: 30.5 PG — SIGNIFICANT CHANGE UP (ref 27–34)
MCHC RBC-ENTMCNC: 34.3 GM/DL — SIGNIFICANT CHANGE UP (ref 32–36)
MCV RBC AUTO: 88.9 FL — SIGNIFICANT CHANGE UP (ref 80–100)
MONOCYTES # BLD AUTO: 0.5 K/UL — SIGNIFICANT CHANGE UP (ref 0–0.9)
MONOCYTES NFR BLD AUTO: 7.4 % — SIGNIFICANT CHANGE UP (ref 2–14)
NEUTROPHILS # BLD AUTO: 4.2 K/UL — SIGNIFICANT CHANGE UP (ref 1.8–7.4)
NEUTROPHILS NFR BLD AUTO: 68.5 % — SIGNIFICANT CHANGE UP (ref 43–77)
NITRITE UR-MCNC: NEGATIVE — SIGNIFICANT CHANGE UP
OSMOLALITY UR: 133 MOS/KG — LOW (ref 300–900)
PCP SPEC-MCNC: SIGNIFICANT CHANGE UP
PH UR: 6.5 — SIGNIFICANT CHANGE UP (ref 5–8)
PHOSPHATE 24H UR-MCNC: 28.7 MG/DL — SIGNIFICANT CHANGE UP
PLATELET # BLD AUTO: 348 K/UL — SIGNIFICANT CHANGE UP (ref 150–400)
POTASSIUM UR-SCNC: 9 MMOL/L — SIGNIFICANT CHANGE UP
POTASSIUM UR-SCNC: 9 MMOL/L — SIGNIFICANT CHANGE UP
PROT UR-MCNC: NEGATIVE — SIGNIFICANT CHANGE UP
PROTHROM AB SERPL-ACNC: 12 SEC — SIGNIFICANT CHANGE UP (ref 9.8–12.7)
RBC # BLD: 4.55 M/UL — SIGNIFICANT CHANGE UP (ref 3.8–5.2)
RBC # FLD: 11 % — SIGNIFICANT CHANGE UP (ref 10.3–14.5)
RBC CASTS # UR COMP ASSIST: ABNORMAL /HPF (ref 0–2)
SODIUM UR-SCNC: <20 MMOL/L — SIGNIFICANT CHANGE UP
SP GR SPEC: 1.01 — LOW (ref 1.01–1.02)
T3 SERPL-MCNC: 116 NG/DL — SIGNIFICANT CHANGE UP (ref 80–200)
T4 AB SER-ACNC: 10.5 UG/DL — SIGNIFICANT CHANGE UP (ref 4.6–12)
TSH SERPL-MCNC: 0.67 UIU/ML — SIGNIFICANT CHANGE UP (ref 0.27–4.2)
URATE SERPL-MCNC: 4.5 MG/DL — SIGNIFICANT CHANGE UP (ref 2.5–7)
UROBILINOGEN FLD QL: NEGATIVE — SIGNIFICANT CHANGE UP
WBC # BLD: 6.2 K/UL — SIGNIFICANT CHANGE UP (ref 3.8–10.5)
WBC # FLD AUTO: 6.2 K/UL — SIGNIFICANT CHANGE UP (ref 3.8–10.5)
WBC UR QL: ABNORMAL /HPF (ref 0–5)

## 2017-12-11 PROCEDURE — 93975 VASCULAR STUDY: CPT | Mod: 26

## 2017-12-11 PROCEDURE — 99232 SBSQ HOSP IP/OBS MODERATE 35: CPT | Mod: GC

## 2017-12-11 PROCEDURE — 99223 1ST HOSP IP/OBS HIGH 75: CPT | Mod: GC

## 2017-12-11 RX ORDER — ACETAMINOPHEN 500 MG
650 TABLET ORAL EVERY 6 HOURS
Qty: 0 | Refills: 0 | Status: DISCONTINUED | OUTPATIENT
Start: 2017-12-11 | End: 2017-12-14

## 2017-12-11 RX ORDER — NITROFURANTOIN MACROCRYSTAL 50 MG
100 CAPSULE ORAL
Qty: 0 | Refills: 0 | Status: DISCONTINUED | OUTPATIENT
Start: 2017-12-11 | End: 2017-12-14

## 2017-12-11 RX ORDER — CEPHALEXIN 500 MG
500 CAPSULE ORAL EVERY 12 HOURS
Qty: 0 | Refills: 0 | Status: DISCONTINUED | OUTPATIENT
Start: 2017-12-11 | End: 2017-12-11

## 2017-12-11 RX ORDER — ALPRAZOLAM 0.25 MG
1 TABLET ORAL EVERY 6 HOURS
Qty: 0 | Refills: 0 | Status: DISCONTINUED | OUTPATIENT
Start: 2017-12-11 | End: 2017-12-12

## 2017-12-11 RX ORDER — ESCITALOPRAM OXALATE 10 MG/1
10 TABLET, FILM COATED ORAL DAILY
Qty: 0 | Refills: 0 | Status: DISCONTINUED | OUTPATIENT
Start: 2017-12-11 | End: 2017-12-14

## 2017-12-11 RX ADMIN — Medication 1 MILLIGRAM(S): at 10:37

## 2017-12-11 RX ADMIN — Medication 650 MILLIGRAM(S): at 22:36

## 2017-12-11 RX ADMIN — Medication 650 MILLIGRAM(S): at 06:23

## 2017-12-11 RX ADMIN — Medication 650 MILLIGRAM(S): at 22:05

## 2017-12-11 RX ADMIN — Medication 1 MILLIGRAM(S): at 17:32

## 2017-12-11 RX ADMIN — ESCITALOPRAM OXALATE 10 MILLIGRAM(S): 10 TABLET, FILM COATED ORAL at 00:43

## 2017-12-11 RX ADMIN — Medication 25 MILLIGRAM(S): at 12:12

## 2017-12-11 RX ADMIN — Medication 650 MILLIGRAM(S): at 15:42

## 2017-12-11 RX ADMIN — Medication 25 MILLIGRAM(S): at 17:34

## 2017-12-11 RX ADMIN — Medication 25 MILLIGRAM(S): at 00:20

## 2017-12-11 RX ADMIN — Medication 100 MILLIGRAM(S): at 19:01

## 2017-12-11 RX ADMIN — Medication 650 MILLIGRAM(S): at 00:43

## 2017-12-11 RX ADMIN — Medication 650 MILLIGRAM(S): at 04:06

## 2017-12-11 RX ADMIN — FAMOTIDINE 20 MILLIGRAM(S): 10 INJECTION INTRAVENOUS at 12:12

## 2017-12-11 NOTE — CONSULT NOTE ADULT - ATTENDING COMMENTS
26 y/o  female  PPD#1 s/p VAVD on  complicated by postpartum preeclampsia (s/p Mg 12/3-) with readmission on 10/5 for postpartum PEC (s/p Mg -), presents with severe range BPs (200/110), HA, blurry vision, SOB.     HTN: c/w pre eclampsia  Currently with improved BP  Renal sonogram without GEETA  Await Renin/Markell level  Check plasma metanephrines  Continue with current regimen  Would ensure that she does not have retained products of conception
Thank you for the consultation and allowing me to participate in the care of Ms. Mojcia. My team will follow.    Geoffrey Pastor M.D, F.A.C.C  [facility]   [phone]

## 2017-12-11 NOTE — PROGRESS NOTE ADULT - SUBJECTIVE AND OBJECTIVE BOX
OB attending      pt denies PIH sx, just anxious and wants this to be resolved      MEDICATIONS  (STANDING):  acetaminophen   Tablet. 650 milliGRAM(s) Oral every 6 hours  escitalopram 10 milliGRAM(s) Oral daily  famotidine    Tablet 20 milliGRAM(s) Oral daily  hydrALAZINE 25 milliGRAM(s) Oral every 6 hours  NIFEdipine XL 60 milliGRAM(s) Oral daily  prenatal multivitamin 1 Tablet(s) Oral daily    MEDICATIONS  (PRN):  ALPRAZolam 1 milliGRAM(s) Oral every 6 hours PRN anxiety            12 point ROS negative except as outlined above    Vital Signs Last 24 Hrs  T(C): 36.5 (11 Dec 2017 10:39), Max: 37.1 (10 Dec 2017 17:36)  T(F): 97.7 (11 Dec 2017 10:39), Max: 98.7 (10 Dec 2017 17:36)  HR: 80 (11 Dec 2017 12:06) (60 - 93)  BP: 130/74 (11 Dec 2017 12:06) (104/60 - 209/119)  BP(mean): --  RR: 18 (11 Dec 2017 10:39) (17 - 20)  SpO2: 98% (11 Dec 2017 10:39) (97% - 100%)          PHYSICAL EXAM:    GA: NAD, A+0 x 3  Uterus: Fundus midline; firm at 3 fb below umbilicus  : lochia WNL; perineum: ____  Hernandez:   Extremities: no swelling or calf tenderness, hyperreflexia          LABS:                        13.9   6.2   )-----------( 348      ( 11 Dec 2017 06:38 )             40.5     12-10    136  |  98  |  11  ----------------------------<  90  4.0   |  22  |  0.60    Ca    9.7      10 Dec 2017 18:18  Phos  3.6     12-10  Mg     2.0     12-10    TPro  7.5  /  Alb  4.2  /  TBili  0.2  /  DBili  x   /  AST  21  /  ALT  38  /  AlkPhos  132<H>  12-10    PT/INR - ( 11 Dec 2017 06:38 )   PT: 12.0 sec;   INR: 1.10 ratio         PTT - ( 11 Dec 2017 06:38 )  PTT:34.5 sec      RADIOLOGY & ADDITIONAL TESTS:  normal renal scan    A/P HD #2 - readmitted with severe range BPs  no pih sx  apprecaite Renal and Cardiology input  cont to alter meds  emtional support given

## 2017-12-11 NOTE — PROGRESS NOTE ADULT - SUBJECTIVE AND OBJECTIVE BOX
24H hour events:   No headache, blurry vision. BP well controlled overnight  MEDICATIONS:  hydrALAZINE 25 milliGRAM(s) Oral every 6 hours  NIFEdipine XL 60 milliGRAM(s) Oral daily        acetaminophen   Tablet. 650 milliGRAM(s) Oral every 6 hours  ALPRAZolam 1 milliGRAM(s) Oral every 6 hours PRN  escitalopram 10 milliGRAM(s) Oral daily    famotidine    Tablet 20 milliGRAM(s) Oral daily      prenatal multivitamin 1 Tablet(s) Oral daily      REVIEW OF SYSTEMS:  CONSTITUTIONAL: no fevers or chills  EYES/ENT: No visual changes; No vertigo or throat pain  NECK: No pain or stiffness  RESPIRATORY: No cough, wheezing  CARDIOVASCULAR: No chest pain or palpitations  GASTOINTESTINAL: No abdominal or epigastric pain. No nausea, vomiting. No diarrhea. No melena  NEUROLOGICAL: No numbness or weakness  SKIN: No itching, burning, rashes or lesions  All other review of systems is negative unless indicated above    PHYSICAL EXAM:  T(C): 36.6 (17 @ 00:26), Max: 37.1 (12-10-17 @ 17:36)  HR: 61 (17 @ 00:26) (60 - 93)  BP: 112/76 (17 @ 00:26) (112/76 - 209/119)  RR: 18 (17 @ 00:26) (17 - 20)  SpO2: 97% (17 @ 00:26) (97% - 100%)  Wt(kg): --  I&O's Summary      Appearance: Normal	  HEENT:   Normal oral mucosa, PERRL, EOMI	  Lymphatic: No lymphadenopathy  Cardiovascular: Normal S1 S2, No JVD, No murmurs, No edema  Respiratory: Lungs clear to auscultation	  Psychiatry: A & O x 3, Mood & affect appropriate  Gastrointestinal:  Soft, Non-tender, + BS	  Skin: No rashes, No ecchymoses, No cyanosis	  Neurologic: Non-focal  Extremities: Normal range of motion, No clubbing, cyanosis or edema  Vascular: Peripheral pulses palpable 2+ bilaterally        LABS:	 	    CBC Full  -  ( 10 Dec 2017 18:18 )  WBC Count : 7.3 K/uL  Hemoglobin : 14.5 g/dL  Hematocrit : 40.9 %  Platelet Count - Automated : 378 K/uL  Mean Cell Volume : 88.3 fl  Mean Cell Hemoglobin : 31.2 pg  Mean Cell Hemoglobin Concentration : 35.3 gm/dL  Auto Neutrophil # : 4.9 K/uL  Auto Lymphocyte # : 1.7 K/uL  Auto Monocyte # : 0.5 K/uL  Auto Eosinophil # : 0.1 K/uL  Auto Basophil # : 0.0 K/uL  Auto Neutrophil % : 67.1 %  Auto Lymphocyte % : 23.2 %  Auto Monocyte % : 7.4 %  Auto Eosinophil % : 1.7 %  Auto Basophil % : 0.6 %    12-10    136  |  98  |  11  ----------------------------<  90  4.0   |  22  |  0.60    Ca    9.7      10 Dec 2017 18:18  Phos  3.6     12-10  Mg     2.0     12-10    TPro  7.5  /  Alb  4.2  /  TBili  0.2  /  DBili  x   /  AST  21  /  ALT  38  /  AlkPhos  132<H>  12-10      PREVIOUS DIAGNOSTIC TESTING:    [ ] Echocardiogram:  < from: Transthoracic Echocardiogram (17 @ 18:29) >  Conclusions:  1. Normal left ventricular systolic function. No segmental  wall motion abnormalities. LVEF 60-65%.  2. Normal diastolic function  3. Normal right ventricular size and function.  4. No pericardial effusion seen.  *** No previous Echo exam. Technically difficult study due  to body habitus (pregnancy and breast implants).    < end of copied text >    ASSESSMENT/PLAN: 	  27F  PPD#1 sp VAVD on  c/b postpartum preeclampsia presenting today with postpartum preeclampsia w/ bp to 200/110s, with headaches, blurry vision, and dyspnea. CTH negative.     1. hypertensive emergency  -now resolved. BP well controlled on hydralazine, nifedipine. Asymptomatic  -Secondary hypertension workup pending, including renin/aldosterone and metanephrines However this presentation is inconsistent with hyperaldosteronism as well as with pheochromocytoma. While in the hospital, BP is well controlled, but as an outpatient, BP is uncontrolled. This raises the question of medication compliance. Will discuss with OB team    Kenneth Malone [id number] 24H hour events:   Mild frontal HA(BP normal all day).No blurry vision. BP well controlled overnight  MEDICATIONS:  hydrALAZINE 25 milliGRAM(s) Oral every 6 hours  NIFEdipine XL 60 milliGRAM(s) Oral daily        acetaminophen   Tablet. 650 milliGRAM(s) Oral every 6 hours  ALPRAZolam 1 milliGRAM(s) Oral every 6 hours PRN  escitalopram 10 milliGRAM(s) Oral daily    famotidine    Tablet 20 milliGRAM(s) Oral daily      prenatal multivitamin 1 Tablet(s) Oral daily      REVIEW OF SYSTEMS:  CONSTITUTIONAL: no fevers or chills  EYES/ENT: No visual changes; No vertigo or throat pain  NECK: No pain or stiffness  RESPIRATORY: No cough, wheezing  CARDIOVASCULAR: No chest pain or palpitations  GASTOINTESTINAL: No abdominal or epigastric pain. No nausea, vomiting. No diarrhea. No melena  NEUROLOGICAL: No numbness or weakness  SKIN: No itching, burning, rashes or lesions  All other review of systems is negative unless indicated above    PHYSICAL EXAM:  T(C): 36.6 (17 @ 00:26), Max: 37.1 (12-10-17 @ 17:36)  HR: 61 (17 @ 00:26) (60 - 93)  BP: 112/76 (17 @ 00:26) (112/76 - 209/119)  RR: 18 (17 @ 00:26) (17 - 20)  SpO2: 97% (17 @ 00:26) (97% - 100%)  Wt(kg): --  I&O's Summary      Appearance: Normal	  HEENT:   Normal oral mucosa, PERRL, EOMI	  Lymphatic: No lymphadenopathy  Cardiovascular: Normal S1 S2, No JVD, No murmurs, No edema  Respiratory: Lungs clear to auscultation	  Psychiatry: A & O x 3, Mood & affect appropriate  Gastrointestinal:  Soft, Non-tender, + BS	  Skin: No rashes, No ecchymoses, No cyanosis	  Neurologic: Non-focal  Extremities: Normal range of motion, No clubbing, cyanosis or edema  Vascular: Peripheral pulses palpable 2+ bilaterally        LABS:	 	    CBC Full  -  ( 10 Dec 2017 18:18 )  WBC Count : 7.3 K/uL  Hemoglobin : 14.5 g/dL  Hematocrit : 40.9 %  Platelet Count - Automated : 378 K/uL  Mean Cell Volume : 88.3 fl  Mean Cell Hemoglobin : 31.2 pg  Mean Cell Hemoglobin Concentration : 35.3 gm/dL  Auto Neutrophil # : 4.9 K/uL  Auto Lymphocyte # : 1.7 K/uL  Auto Monocyte # : 0.5 K/uL  Auto Eosinophil # : 0.1 K/uL  Auto Basophil # : 0.0 K/uL  Auto Neutrophil % : 67.1 %  Auto Lymphocyte % : 23.2 %  Auto Monocyte % : 7.4 %  Auto Eosinophil % : 1.7 %  Auto Basophil % : 0.6 %    12-10    136  |  98  |  11  ----------------------------<  90  4.0   |  22  |  0.60    Ca    9.7      10 Dec 2017 18:18  Phos  3.6     12-10  Mg     2.0     12-10    TPro  7.5  /  Alb  4.2  /  TBili  0.2  /  DBili  x   /  AST  21  /  ALT  38  /  AlkPhos  132<H>  12-10      PREVIOUS DIAGNOSTIC TESTING:    [ ] Echocardiogram:  < from: Transthoracic Echocardiogram (17 @ 18:29) >  Conclusions:  1. Normal left ventricular systolic function. No segmental  wall motion abnormalities. LVEF 60-65%.  2. Normal diastolic function  3. Normal right ventricular size and function.  4. No pericardial effusion seen.  *** No previous Echo exam. Technically difficult study due  to body habitus (pregnancy and breast implants).    < end of copied text >    ASSESSMENT/PLAN: 	  27F  PPD#1 sp VAVD on  c/b postpartum preeclampsia presenting today with postpartum preeclampsia w/ bp to 200/110s, with headaches, blurry vision, and dyspnea. CTH negative.     1. hypertensive emergency  -now resolved. BP well controlled on hydralazine, nifedipine. Asymptomatic  -Secondary hypertension workup pending, including renin/aldosterone and metanephrines However this presentation is inconsistent with hyperaldosteronism as well as with pheochromocytoma. While in the hospital, BP is well controlled, but as an outpatient, BP is uncontrolled. This raises the question of medication compliance. Will discuss with OB team    Kenneth Malone [id number]

## 2017-12-11 NOTE — CONSULT NOTE ADULT - ASSESSMENT
26 y/o  female  PPD#1 s/p VAVD on  complicated by postpartum preeclampsia (s/p Mg 12/3-) with readmission on 10/5 for postpartum PEC (s/p Mg -), presents with severe range BPs (200/110), HA, blurry vision, SOB.

## 2017-12-11 NOTE — CHART NOTE - NSCHARTNOTEFT_GEN_A_CORE
Pt. had been on Macrobid for UTI which Urine C&S states is E. Coli sensitive to Nitrofurantoin.  Pt. had been ordered for Keflex but has not started yet.  Pt. prefers to be cont''d on Macrobid and accordingly, antibiotic tx changed to Macrobid.  RUTH Castillo

## 2017-12-11 NOTE — PROGRESS NOTE ADULT - SUBJECTIVE AND OBJECTIVE BOX
INTERVAL HPI/OVERNIGHT EVENTS: Pt seen and examined at bedside.  BP has significantly decreased since presentation. Seen by Cardiology overnight. Pain controlled, still has mild frontal HA. No blurry vision, chest pain, RUQ pain, epigastric pain. OOB, tolerating PO, voiding. No fevers, chills, nausea, emesis, SOB.    MEDICATIONS  (STANDING):  acetaminophen   Tablet. 650 milliGRAM(s) Oral every 6 hours  escitalopram 10 milliGRAM(s) Oral daily  famotidine    Tablet 20 milliGRAM(s) Oral daily  hydrALAZINE 25 milliGRAM(s) Oral every 6 hours  NIFEdipine XL 60 milliGRAM(s) Oral daily  prenatal multivitamin 1 Tablet(s) Oral daily    MEDICATIONS  (PRN):  ALPRAZolam 1 milliGRAM(s) Oral every 6 hours PRN anxiety      Allergies    No Known Allergies    Intolerances        12 point ROS negative except as outlined above    Vital Signs Last 24 Hrs  T(C): 36.6 (11 Dec 2017 00:26), Max: 37.1 (10 Dec 2017 17:36)  T(F): 97.8 (11 Dec 2017 00:26), Max: 98.7 (10 Dec 2017 17:36)  HR: 61 (11 Dec 2017 00:26) (60 - 93)  BP: 112/76 (11 Dec 2017 00:26) (112/76 - 209/119)  BP(mean): --  RR: 18 (11 Dec 2017 00:26) (17 - 20)  SpO2: 97% (11 Dec 2017 00:26) (97% - 100%)      PHYSICAL EXAM:    GA: NAD, A+0 x 3  CV: RRR  Pulm: CTA BL  Abd: oft, nontender, nondistended, no rebound or guarding,   Extremities: no swelling or calf tenderness, reflexes +2 bilaterally          LABS:             14.5   7.3   )-----------( 378      ( 12-10 @ 18:18 )             40.9                14.1   6.9   )-----------( 295      ( 12-08 @ 13:48 )             41.1       12-10 @ 18:18    136  |  98  |  11  ----------------------------<  90  4.0   |  22  |  0.60    12-08 @ 13:48    139  |  101  |  12  ----------------------------<  107  4.2   |  24  |  0.68      Phos  3.6     12-10 @ 18:18  Phos  3.6     12-08 @ 13:48  Mg     2.0     12-10 @ 18:18  Mg     1.9     12-08 @ 13:48    TPro  7.5  /  Alb  4.2  /  TBili  0.2  /  DBili  x   /  AST  21  /  ALT  38  /  AlkPhos  132  12-10 @ 18:18  TPro  7.3  /  Alb  4.0  /  TBili  0.3  /  DBili  x   /  AST  19  /  ALT  51  /  AlkPhos  132  12-08 @ 13:48    PT/INR - ( 10 Dec 2017 18:18 )   PT: 11.6 sec;   INR: 1.06 ratio         PTT - ( 10 Dec 2017 18:18 )  PTT:34.3 sec      RADIOLOGY & ADDITIONAL TESTS:

## 2017-12-11 NOTE — CONSULT NOTE ADULT - PROBLEM SELECTOR RECOMMENDATION 9
27 F with intermittent hypertension and anxiety.   Unknown etiology of HTN at this time. Had pre eclampsia during her pregnancy. Never on anti htn meds prior to this.  There is concern for secondary causes of HTN  would check (sent): US kidney with duplex of arteries. UA, Urine lytes, PTH, TSH, T4, T3, plasma renin, serum aldosterone, Utox, urine metanephrines, AM cortisol,

## 2017-12-11 NOTE — CONSULT NOTE ADULT - SUBJECTIVE AND OBJECTIVE BOX
[facility] DIVISION OF KIDNEY DISEASES AND HYPERTENSION -- INITIAL CONSULT NOTE  --------------------------------------------------------------------------------  Cole Dill     --------------------------------------------------------------------------------  HPI:    26 y/o  PPD#1 s/p VAVD on  complicated by postpartum preeclampsia (s/p Mg 12/3-) with readmission on 10/5 for postpartum PEC (s/p Mg -), presents with severe range BPs (200/110), HA, blurry vision, SOB. Pt reports nausea. GI reflux.  Denies any chest pain, palpitations, RUQ pain, LE edema or pain.     Patient was discharged on to Labetalol 300mg TID after her first admission and  then with Labetalol and Procardia 30mg Xl qd on her second admission.  Denies missing any doses.  Upon arrival to ED, /146mmHg, repeat 200/118mmHg.  Pt given IV Hydralzine 10mg then 5mg.     During last admission, patient was evaluated by Cardiology.  TTE (): WNL.  EF 60-65%. No aortic abnormality, no LVH  She was restarted on lexapro for anxiety after her delivery. Denies using nsaids, herbs or over the counter medication. During her anxiety attack she feels sob and tingling throughout her body, disorientation and has trouble swallowing. she denies any perspiration. Her triggers for anxiety include her own thoughts, and sometimes it's random. She has taken her bp during her panic attack and denies any elevation of BP during her episode.   No family hx of HTN, No salty diet. Patient denies having this happen before. She never was on any anti hypertensive meds prior      All: NKDA  SH: denies alcohol, tobacco illicit drug use, no nsaid use.         PAST HISTORY  --------------------------------------------------------------------------------  PAST MEDICAL & SURGICAL HISTORY:  Anxiety - restarted on lexapro after dilivery  Obsessive-compulsive disorder, unspecified type  Eclampsia  H/O breast augmentation    FAMILY HISTORY:  No pertinent family history in first degree relatives    PAST SOCIAL HISTORY:    ALLERGIES & MEDICATIONS  --------------------------------------------------------------------------------  Allergies    No Known Allergies    Intolerances      Standing Inpatient Medications  acetaminophen   Tablet. 650 milliGRAM(s) Oral every 6 hours  escitalopram 10 milliGRAM(s) Oral daily  famotidine    Tablet 20 milliGRAM(s) Oral daily  hydrALAZINE 25 milliGRAM(s) Oral every 6 hours  NIFEdipine XL 60 milliGRAM(s) Oral daily  prenatal multivitamin 1 Tablet(s) Oral daily    PRN Inpatient Medications  ALPRAZolam 1 milliGRAM(s) Oral every 6 hours PRN      REVIEW OF SYSTEMS  --------------------------------------------------------------------------------  Constitutional: [ ] Fever [ ] Chills [ ] Fatigue [ ] Weight change   HEENT: [ ] Blurred vision [ ] Eye Pain [ ] Headache [ ] Runny nose [ ] Sore Throat   Respiratory: [ ] Cough [ ] Wheezing [ ] Shortness of breath  Cardiovascular: [ ] Chest Pain [ ] Palpitations [ ] ALFARO [ ] PND [ ] Orthopnea  Gastrointestinal: [ ] Abdominal Pain [ ] Diarrhea [ ] Constipation [ ] Hemorrhoids [ ] Nausea [ ] Vomiting  Genitourinary: [ ] Nocturia [ ] Dysuria [ ] Incontinence  Extremities: [ ] Swelling [ ] Joint Pain  Neurologic: [ ] Focal deficit [ ] Paresthesias [ ] Syncope  Lymphatic: [ ] Swelling [ ] Lymphadenopathy   Skin: [ ] Rash [ ] Ecchymoses [ ] Wounds [ ] Lesions  Psychiatry: [ ] Depression [ ] Suicidal/Homicidal Ideation [ ] Anxiety [ ] Sleep Disturbances  [ ] 10 point review of systems is otherwise negative except as mentioned above              [ ]Unable to obtain      All other systems were reviewed and are negative, except as noted.    VITALS/PHYSICAL EXAM  --------------------------------------------------------------------------------  T(C): 36.5 (17 @ 06:39), Max: 37.1 (12-10-17 @ 17:36)  HR: 77 (17 @ 06:39) (60 - 93)  BP: 106/69 (17 @ 06:39) (106/69 - 209/119)  RR: 18 (17 @ 06:39) (17 - 20)  SpO2: 98% (17 @ 06:39) (97% - 100%)  Wt(kg): --      Daily     Daily   I&O's Summary    Physical Exam:  	Gen: NAD   	HEENT: anicteric  	Pulm: CTA B/L   	CV: RRR  	Back: No dependent edema  	Abd: soft, nontender, nondistended  	: No villarreal  	LE: Warm, no edema  	Neuro: no asterixis  	Skin: Warm, without rashes  	Vascular access: none      LABS/STUDIES  --------------------------------------------------------------------------------              13.9   6.2   >-----------<  348      [17 06:38]              40.5     136  |  98  |  11  ----------------------------<  90      [12-10-17 @ 18:18]  4.0   |  22  |  0.60        Ca     9.7     [12-10-17 @ 18:18]      Mg     2.0     [12-10-17 @ 18:18]      Phos  3.6     [12-10-17 @ 18:18]    TPro  7.5  /  Alb  4.2  /  TBili  0.2  /  DBili  x   /  AST  21  /  ALT  38  /  AlkPhos  132  [12-10-17 @ 18:18]    PT/INR: PT 12.0 , INR 1.10       [17:38]  PTT: 34.5       [17:38]    Uric acid 4.5      [17:38]        [12-10-17 @ 18:18]    Creatinine Trend:  SCr 0.60 [12-10 @ 18:18]  SCr 0.68 [ 13:48]  SCr 0.62 [ 07:09]  SCr 0.60 [ 11:42]  SCr 0.74 [ 10:08]    Urinalysis - [17 @ 15:27]      Color PL Yellow / Appearance Clear / SG <1.005 / pH 6.5      Gluc Negative / Ketone Negative  / Bili Negative / Urobili Negative       Blood Moderate / Protein Negative / Leuk Est Large / Nitrite Negative      RBC 5-10 / WBC 26-50 / Hyaline  / Gran  / Sq Epi  / Non Sq Epi OCC / Bacteria Few          Syphilis Screen (Treponema Pallidum Ab) Negative      [17 @ 17:02]      Radiology  --------------------------------------------------------------------------------    --------------------------------------------------------------------------------  Cole Moyaahim

## 2017-12-12 DIAGNOSIS — F41.0 PANIC DISORDER [EPISODIC PAROXYSMAL ANXIETY]: ICD-10-CM

## 2017-12-12 LAB
ALDOST SERPL-MCNC: 11.1 NG/DL — SIGNIFICANT CHANGE UP
CULTURE RESULTS: SIGNIFICANT CHANGE UP
SPECIMEN SOURCE: SIGNIFICANT CHANGE UP

## 2017-12-12 PROCEDURE — 99233 SBSQ HOSP IP/OBS HIGH 50: CPT | Mod: GC

## 2017-12-12 PROCEDURE — 90792 PSYCH DIAG EVAL W/MED SRVCS: CPT | Mod: GC

## 2017-12-12 RX ORDER — PHENAZOPYRIDINE HCL 100 MG
200 TABLET ORAL EVERY 8 HOURS
Qty: 0 | Refills: 0 | Status: DISCONTINUED | OUTPATIENT
Start: 2017-12-12 | End: 2017-12-14

## 2017-12-12 RX ORDER — QUETIAPINE FUMARATE 200 MG/1
25 TABLET, FILM COATED ORAL AT BEDTIME
Qty: 0 | Refills: 0 | Status: DISCONTINUED | OUTPATIENT
Start: 2017-12-12 | End: 2017-12-13

## 2017-12-12 RX ORDER — HYDRALAZINE HCL 50 MG
25 TABLET ORAL EVERY 12 HOURS
Qty: 0 | Refills: 0 | Status: DISCONTINUED | OUTPATIENT
Start: 2017-12-12 | End: 2017-12-14

## 2017-12-12 RX ORDER — QUETIAPINE FUMARATE 200 MG/1
12.5 TABLET, FILM COATED ORAL EVERY 6 HOURS
Qty: 0 | Refills: 0 | Status: DISCONTINUED | OUTPATIENT
Start: 2017-12-12 | End: 2017-12-13

## 2017-12-12 RX ORDER — PHENAZOPYRIDINE HCL 100 MG
100 TABLET ORAL EVERY 8 HOURS
Qty: 0 | Refills: 0 | Status: DISCONTINUED | OUTPATIENT
Start: 2017-12-12 | End: 2017-12-12

## 2017-12-12 RX ORDER — ALPRAZOLAM 0.25 MG
1 TABLET ORAL ONCE
Qty: 0 | Refills: 0 | Status: DISCONTINUED | OUTPATIENT
Start: 2017-12-12 | End: 2017-12-12

## 2017-12-12 RX ADMIN — Medication 650 MILLIGRAM(S): at 20:43

## 2017-12-12 RX ADMIN — Medication 100 MILLIGRAM(S): at 13:49

## 2017-12-12 RX ADMIN — FAMOTIDINE 20 MILLIGRAM(S): 10 INJECTION INTRAVENOUS at 11:14

## 2017-12-12 RX ADMIN — Medication 25 MILLIGRAM(S): at 23:21

## 2017-12-12 RX ADMIN — Medication 650 MILLIGRAM(S): at 20:11

## 2017-12-12 RX ADMIN — Medication 200 MILLIGRAM(S): at 21:48

## 2017-12-12 RX ADMIN — Medication 650 MILLIGRAM(S): at 04:48

## 2017-12-12 RX ADMIN — Medication 25 MILLIGRAM(S): at 00:07

## 2017-12-12 RX ADMIN — Medication 1 MILLIGRAM(S): at 21:47

## 2017-12-12 RX ADMIN — Medication 650 MILLIGRAM(S): at 11:14

## 2017-12-12 RX ADMIN — Medication 1 MILLIGRAM(S): at 11:42

## 2017-12-12 RX ADMIN — Medication 25 MILLIGRAM(S): at 11:14

## 2017-12-12 RX ADMIN — Medication 100 MILLIGRAM(S): at 18:00

## 2017-12-12 RX ADMIN — Medication 650 MILLIGRAM(S): at 05:16

## 2017-12-12 RX ADMIN — Medication 1 MILLIGRAM(S): at 00:17

## 2017-12-12 RX ADMIN — Medication 650 MILLIGRAM(S): at 12:00

## 2017-12-12 RX ADMIN — Medication 60 MILLIGRAM(S): at 06:35

## 2017-12-12 RX ADMIN — Medication 100 MILLIGRAM(S): at 10:09

## 2017-12-12 RX ADMIN — ESCITALOPRAM OXALATE 10 MILLIGRAM(S): 10 TABLET, FILM COATED ORAL at 00:06

## 2017-12-12 NOTE — DIETITIAN INITIAL EVALUATION ADULT. - OTHER INFO
Pt is a  s/p delivery on  now readmitted x2 for pre eclampsia. Pt known to this RD from last visit where Pt and mother verbalized concern over lack of appetite and nausea post partum. Pt reports appetite improving daily and no nausea or emesis. Denies diarrhea or constipation. Noted breakfast tray at bedside with >75% intake. Reinforced benefit of small frequent meals. NKFA

## 2017-12-12 NOTE — BEHAVIORAL HEALTH ASSESSMENT NOTE - SUMMARY
Ms. Mojica is a 28 y/o white woman, domiciled with boyfriend and  baby, history of anxiety and OCD (controlled with lexapro prior to pregnancy, now restarted on lexapro), followed by psychiatrist Dr. Sade mace and psychologist James Alva), history of substance use (~5 years of alcohol/benzo use, sober x1.5 years), no history of psych hospitalization, no PMHx, currently admitted for hypertensive episodes (HELLP?) who was consulted for assistance with increased anxiety since the birth of her child. Her symptoms of anxiety, including brief episodes of SOB, chest tightness, impending doom, palpitations that have no concrete trigger and dissipate shortly after initiation, are consistent with panic attacks. Her continued attacks, coupled with her fear of having additional attacks, meet criteria for panic disorder. Given that her worry revolves around these panic attacks, it is not clear if she also suffers from generalized anxiety disorder as well. Her lack of anhedonia and confounding medical illness contributing to fatigue, sleep, and appetite disturbance rule out a diagnosis of major depressive disorder at this time. The patient would benefit from continuation of her SSRI lexapro (first line treatment for panic disorder) with anti-anxiety medication as needed for severe symptoms of anxiety. She restarted lexapro only 5 days ago and will need at least 6 weeks of treatment in order to determine her response. Since the patient is not breast feeding, small doses of seroquel would be preferred to treat her anxiety, given the patient's history of substance use. Benzodiazepines may be used, but should be used sparingly and as a second line agent to treat her anxiety.

## 2017-12-12 NOTE — BEHAVIORAL HEALTH ASSESSMENT NOTE - HPI (INCLUDE ILLNESS QUALITY, SEVERITY, DURATION, TIMING, CONTEXT, MODIFYING FACTORS, ASSOCIATED SIGNS AND SYMPTOMS)
Ms. Mojica is a 26 y/o white woman, domiciled with boyfriend and  baby, history of anxiety and OCD (controlled with lexapro prior to pregnancy, now restarted on lexapro), followed by psychiatrist Dr. Sade mace and psychologist James Alva), history of substance use (~5 years of alcohol/benzo use, sober x1.5 years), no history of psych hospitalization, no PMHx, currently admitted for hypertensive episodes (HELLP?) who was consulted for assistance with increased anxiety since the birth of her child. The patient reports a history of baseline anxiety/panic attacks that began in college with episodes of shortness of breath, a sense of impending death, chest tightness and racing thoughts occurring every other day. Since the birth of her child, these episodes have been "multiplied by 1000" and occur 3-4 times per day. They last for 20 minutes at a time and are associated with shortness of breath, chest tightness, impending sense of death, racing thoughts and palpitations. She believes they are triggered by episodes of hypertension (she has experienced hypertensive episodes requiring additional hospitalizations and medical workup since the birth of her child). She is now constantly worried about having another panic attack. This has caused her to worry about holding her baby because she thinks she will have an episode while taking care of him. In addition to the more frequent panic attacks, the patient reports feeling "disconnected" from family and her baby since delivery. She describes herself as a person who is usually in touch with her and other people's emotions, but since delivery, she feels as if she has no emotions. She cannot connect with anyone and because of this does not feel like talking to her friends, family and outpatient providers like usual. The patient acknowledges feeling depressed, but denies anhedonia. She endorses fatigue, decreased sleep, poor appetite for the past week, attributing these symptoms to her medical workup. She feels helpless at this point in time, but remains hopeful about the future and denied passive/active SI and HI towards her baby. No evidence of yasir discerned.     Of note, the patient believes her panic attacks several years ago were exacerbated by frequent, heavy alcohol/xanax use. She used alcohol daily to the point of blacking out, but became sober one and a half years ago and continues to follow up with AA. She was started on lexapro around this time as well with reported control of panic attacks and anxiety. She follows with a  psychologist (James Alva) and an outpatient psychiatrist Dr. Stuart. Ms. Mojica is a 28 y/o white woman, domiciled with boyfriend and  baby, history of anxiety and OCD (controlled with lexapro prior to pregnancy, now restarted on lexapro), followed by psychiatrist Dr. Sade mace and psychologist James Alva), history of substance use (~5 years of alcohol/benzo use, sober x1.5 years), no history of psych hospitalization, no PMHx, currently admitted for hypertensive episodes (HELLP?) who was consulted for assistance with increased anxiety since the birth of her child.     The patient reports a history of baseline anxiety/panic attacks that began in college with episodes of shortness of breath, a sense of impending death, chest tightness and racing thoughts occurring every other day. Since the birth of her child, these episodes have been "multiplied by 1000" and occur 3-4 times per day. They last for 20 minutes at a time and are associated with shortness of breath, chest tightness, impending sense of death, racing thoughts and palpitations. She believes they are triggered by episodes of hypertension (she has experienced hypertensive episodes requiring additional hospitalizations and medical workup since the birth of her child). She is now constantly worried about having another panic attack. This has caused her to worry about holding her baby because she thinks she will have an episode while taking care of him. In addition to the more frequent panic attacks, the patient reports feeling "disconnected" from family and her baby since delivery. She describes herself as a person who is usually in touch with her and other people's emotions, but since delivery, she feels as if she has no emotions. She cannot connect with anyone and because of this does not feel like talking to her friends, family and outpatient providers like usual. The patient acknowledges feeling depressed, but denies anhedonia. She endorses fatigue, decreased sleep, poor appetite for the past week, attributing these symptoms to her medical workup. She feels helpless at this point in time, but remains hopeful about the future and denied passive/active SI and HI towards her baby. No evidence of yasir discerned.     Of note, the patient believes her panic attacks several years ago were exacerbated by frequent, heavy alcohol/xanax use. She used alcohol daily to the point of blacking out, but became sober one and a half years ago and continues to follow up with AA. She was started on lexapro around this time as well with reported control of panic attacks and anxiety. She follows with a  psychologist (James Alva) and an outpatient psychiatrist Dr. Stuart.

## 2017-12-12 NOTE — PROGRESS NOTE ADULT - SUBJECTIVE AND OBJECTIVE BOX
INTERVAL HPI/OVERNIGHT EVENTS: Pt seen and examined at bedside.  No acute events overnight. Feels bilateral calf soreness without swelling. Mild abdominal pain. No HA, blurry vision, CP/SOB, RUQ pain, epigastric pain. OOB, tolerating PO, voiding.     MEDICATIONS  (STANDING):  acetaminophen   Tablet. 650 milliGRAM(s) Oral every 6 hours  escitalopram 10 milliGRAM(s) Oral daily  famotidine    Tablet 20 milliGRAM(s) Oral daily  hydrALAZINE 25 milliGRAM(s) Oral every 6 hours  NIFEdipine XL 60 milliGRAM(s) Oral daily  nitrofurantoin (MACROBID) 100 milliGRAM(s) Oral two times a day with meals  prenatal multivitamin 1 Tablet(s) Oral daily    MEDICATIONS  (PRN):  ALPRAZolam 1 milliGRAM(s) Oral every 6 hours PRN anxiety      Allergies    No Known Allergies    Intolerances        12 point ROS negative except as outlined above    Vital Signs Last 24 Hrs  T(C): 36.4 (12 Dec 2017 00:11), Max: 36.7 (11 Dec 2017 12:06)  T(F): 97.6 (12 Dec 2017 00:11), Max: 98 (11 Dec 2017 12:06)  HR: 70 (12 Dec 2017 00:11) (70 - 80)  BP: 120/70 (12 Dec 2017 00:11) (104/60 - 130/74)  BP(mean): --  RR: 18 (12 Dec 2017 00:11) (18 - 18)  SpO2: 96% (12 Dec 2017 00:11) (96% - 98%)      PHYSICAL EXAM:    GA: NAD, A+0 x 3  CV: RRR  Pulm: CTA BL  Abd: ( + ) BS, soft, nontender, nondistended, no rebound or guarding,   Extremities: no swelling or calf tenderness, reflexes +2 bilaterally. Negative Cisco's          LABS:             13.9   6.2   )-----------( 348      (  @ 06:38 )             40.5                14.5   7.3   )-----------( 378      ( 12-10 @ 18:18 )             40.9       12-10 @ 18:18    136  |  98  |  11  ----------------------------<  90  4.0   |  22  |  0.60      Phos  3.6     12-10 @ 18:18  Mg     2.0     12-10 @ 18:18    TPro  7.5  /  Alb  4.2  /  TBili  0.2  /  DBili  x   /  AST  21  /  ALT  38  /  AlkPhos  132  12-10 @ 18:18    PT/INR - ( 11 Dec 2017 06:38 )   PT: 12.0 sec;   INR: 1.10 ratio         PTT - ( 11 Dec 2017 06:38 )  PTT:34.5 sec  Urinalysis Basic - ( 11 Dec 2017 14:16 )    Color: Colorless / Appearance: Clear / S.006 / pH: x  Gluc: x / Ketone: Negative  / Bili: Negative / Urobili: Negative   Blood: x / Protein: Negative / Nitrite: Negative   Leuk Esterase: Large / RBC: 2-5 /HPF / WBC 10-25 /HPF   Sq Epi: x / Non Sq Epi: x / Bacteria: Few /HPF        RADIOLOGY & ADDITIONAL TESTS:

## 2017-12-12 NOTE — BEHAVIORAL HEALTH ASSESSMENT NOTE - NSBHCHARTREVIEWINVESTIGATE_PSY_A_CORE FT
EKG with QTc of 392    < from: 12 Lead ECG (12.10.17 @ 18:04) >      Ventricular Rate 49 BPM    Atrial Rate 49 BPM    P-R Interval 158 ms    QRS Duration 80 ms     ms    QTc 392 ms    P Axis 49 degrees    R Axis 16 degrees    T Axis 49 degrees    Diagnosis Line MARKED SINUS BRADYCARDIA    < end of copied text >

## 2017-12-12 NOTE — BEHAVIORAL HEALTH ASSESSMENT NOTE - NSBHCHARTREVIEWLAB_PSY_A_CORE FT
13.9   6.2   )-----------( 348      ( 11 Dec 2017 06:38 )             40.5     12-10    136  |  98  |  11  ----------------------------<  90  4.0   |  22  |  0.60    Ca    9.7      10 Dec 2017 18:18  Phos  3.6     12-10  Mg     2.0     12-10    TPro  7.5  /  Alb  4.2  /  TBili  0.2  /  DBili  x   /  AST  21  /  ALT  38  /  AlkPhos  132<H>  12-10

## 2017-12-12 NOTE — PROGRESS NOTE ADULT - SUBJECTIVE AND OBJECTIVE BOX
24H hour events:   No episodes of hypertension overnight. No headaches, blurry vision  MEDICATIONS:  hydrALAZINE 25 milliGRAM(s) Oral every 6 hours  NIFEdipine XL 60 milliGRAM(s) Oral daily    nitrofurantoin (MACROBID) 100 milliGRAM(s) Oral two times a day with meals      acetaminophen   Tablet. 650 milliGRAM(s) Oral every 6 hours  ALPRAZolam 1 milliGRAM(s) Oral every 6 hours PRN  escitalopram 10 milliGRAM(s) Oral daily    famotidine    Tablet 20 milliGRAM(s) Oral daily      prenatal multivitamin 1 Tablet(s) Oral daily      REVIEW OF SYSTEMS:  CONSTITUTIONAL: no fevers or chills  EYES/ENT: No visual changes; No vertigo or throat pain  NECK: No pain or stiffness  RESPIRATORY: No cough, wheezing  CARDIOVASCULAR: No chest pain or palpitations  GASTOINTESTINAL: No abdominal or epigastric pain. No nausea, vomiting. No diarrhea. No melena  NEUROLOGICAL: No numbness or weakness  SKIN: No itching, burning, rashes or lesions  All other review of systems is negative unless indicated above    PHYSICAL EXAM:  T(C): 36.4 (17 @ 00:11), Max: 36.7 (17 @ 12:06)  HR: 70 (17 @ 00:11) (70 - 80)  BP: 120/70 (17 @ 00:11) (104/60 - 130/74)  RR: 18 (17 @ 00:11) (18 - 18)  SpO2: 96% (17 @ 00:11) (96% - 98%)  Wt(kg): --  I&O's Summary      Appearance: Normal	  HEENT:   Normal oral mucosa, PERRL, EOMI	  Lymphatic: No lymphadenopathy  Cardiovascular: Normal S1 S2, No JVD, No murmurs, No edema  Respiratory: Lungs clear to auscultation	  Psychiatry: A & O x 3, Mood & affect appropriate  Gastrointestinal:  Soft, Non-tender, + BS	  Skin: No rashes, No ecchymoses, No cyanosis	  Neurologic: Non-focal  Extremities: Normal range of motion, No clubbing, cyanosis or edema  Vascular: Peripheral pulses palpable 2+ bilaterally        LABS:	 	    CBC Full  -  ( 11 Dec 2017 06:38 )  WBC Count : 6.2 K/uL  Hemoglobin : 13.9 g/dL  Hematocrit : 40.5 %  Platelet Count - Automated : 348 K/uL  Mean Cell Volume : 88.9 fl  Mean Cell Hemoglobin : 30.5 pg  Mean Cell Hemoglobin Concentration : 34.3 gm/dL  Auto Neutrophil # : 4.2 K/uL  Auto Lymphocyte # : 1.4 K/uL  Auto Monocyte # : 0.5 K/uL  Auto Eosinophil # : 0.1 K/uL  Auto Basophil # : 0.0 K/uL  Auto Neutrophil % : 68.5 %  Auto Lymphocyte % : 21.7 %  Auto Monocyte % : 7.4 %  Auto Eosinophil % : 1.8 %  Auto Basophil % : 0.6 %    12-10    136  |  98  |  11  ----------------------------<  90  4.0   |  22  |  0.60    Ca    9.7      10 Dec 2017 18:18  Phos  3.6     12-10  Mg     2.0     12-10    TPro  7.5  /  Alb  4.2  /  TBili  0.2  /  DBili  x   /  AST  21  /  ALT  38  /  AlkPhos  132<H>  12-10    PREVIOUS DIAGNOSTIC TESTING:    [ ] Echocardiogram:  < from: Transthoracic Echocardiogram (17 @ 18:29) >  Conclusions:  1. Normal left ventricular systolic function. No segmental  wall motion abnormalities. LVEF 60-65%.  2. Normal diastolic function  3. Normal right ventricular size and function.  4. No pericardial effusion seen.  *** No previous Echo exam. Technically difficult study due  to body habitus (pregnancy and breast implants).    < end of copied text >    	  ASSESSMENT/PLAN: 	  27F  PPD#1 sp VAVD on  c/b postpartum preeclampsia presenting today with postpartum preeclampsia w/ bp to 200/110s, with headaches, blurry vision, and dyspnea. CTH negative.     1. hypertensive emergency  -BP well controlled on nifedipine and hydralazine at current doses  -Will follow secondary hypertension workup including metanephrines  renin/aldosterone ratio and renal artery ultrasound.    Kenneth Malone [id number] 24H hour events:   No episodes of hypertension overnight. No headaches, blurry vision  MEDICATIONS:  hydrALAZINE 25 milliGRAM(s) Oral every 6 hours  NIFEdipine XL 60 milliGRAM(s) Oral daily    nitrofurantoin (MACROBID) 100 milliGRAM(s) Oral two times a day with meals      acetaminophen   Tablet. 650 milliGRAM(s) Oral every 6 hours  ALPRAZolam 1 milliGRAM(s) Oral every 6 hours PRN  escitalopram 10 milliGRAM(s) Oral daily    famotidine    Tablet 20 milliGRAM(s) Oral daily      prenatal multivitamin 1 Tablet(s) Oral daily      REVIEW OF SYSTEMS:  CONSTITUTIONAL: no fevers or chills  EYES/ENT: No visual changes; No vertigo or throat pain  NECK: No pain or stiffness  RESPIRATORY: No cough, wheezing  CARDIOVASCULAR: No chest pain or palpitations  GASTOINTESTINAL: No abdominal or epigastric pain. No nausea, vomiting. No diarrhea. No melena  NEUROLOGICAL: No numbness or weakness  SKIN: No itching, burning, rashes or lesions  All other review of systems is negative unless indicated above    PHYSICAL EXAM:  T(C): 36.4 (17 @ 00:11), Max: 36.7 (17 @ 12:06)  HR: 70 (17 @ 00:11) (70 - 80)  BP: 120/70 (17 @ 00:11) (104/60 - 130/74)  RR: 18 (17 @ 00:11) (18 - 18)  SpO2: 96% (17 @ 00:11) (96% - 98%)  Wt(kg): --  I&O's Summary      Appearance: Normal	  HEENT:   Normal oral mucosa, PERRL, EOMI	  Lymphatic: No lymphadenopathy  Cardiovascular: Normal S1 S2, No JVD, No murmurs, No edema  Respiratory: Lungs clear to auscultation	  Psychiatry: A & O x 3, Mood & affect appropriate  Gastrointestinal:  Soft, Non-tender, + BS	  Skin: No rashes, No ecchymoses, No cyanosis	  Neurologic: Non-focal  Extremities: Normal range of motion, No clubbing, cyanosis or edema  Vascular: Peripheral pulses palpable 2+ bilaterally        LABS:	 	    CBC Full  -  ( 11 Dec 2017 06:38 )  WBC Count : 6.2 K/uL  Hemoglobin : 13.9 g/dL  Hematocrit : 40.5 %  Platelet Count - Automated : 348 K/uL  Mean Cell Volume : 88.9 fl  Mean Cell Hemoglobin : 30.5 pg  Mean Cell Hemoglobin Concentration : 34.3 gm/dL  Auto Neutrophil # : 4.2 K/uL  Auto Lymphocyte # : 1.4 K/uL  Auto Monocyte # : 0.5 K/uL  Auto Eosinophil # : 0.1 K/uL  Auto Basophil # : 0.0 K/uL  Auto Neutrophil % : 68.5 %  Auto Lymphocyte % : 21.7 %  Auto Monocyte % : 7.4 %  Auto Eosinophil % : 1.8 %  Auto Basophil % : 0.6 %    12-10    136  |  98  |  11  ----------------------------<  90  4.0   |  22  |  0.60    Ca    9.7      10 Dec 2017 18:18  Phos  3.6     12-10  Mg     2.0     12-10    TPro  7.5  /  Alb  4.2  /  TBili  0.2  /  DBili  x   /  AST  21  /  ALT  38  /  AlkPhos  132<H>  12-10    PREVIOUS DIAGNOSTIC TESTING:    [ ] Echocardiogram:  < from: Transthoracic Echocardiogram (17 @ 18:29) >  Conclusions:  1. Normal left ventricular systolic function. No segmental  wall motion abnormalities. LVEF 60-65%.  2. Normal diastolic function  3. Normal right ventricular size and function.  4. No pericardial effusion seen.  *** No previous Echo exam. Technically difficult study due  to body habitus (pregnancy and breast implants).    < end of copied text >    	  ASSESSMENT/PLAN: 	  27F  PPD#1 sp VAVD on  c/b postpartum preeclampsia presenting today with postpartum preeclampsia w/ bp to 200/110s, with headaches, blurry vision, and dyspnea. CTH negative.     1. hypertensive emergency  -BP well controlled on nifedipine and hydralazine at current doses  -Will follow secondary hypertension workup including metanephrines  renin/aldosterone ratio. Renal artery ultrasound with no abnormalities    Kenneth Malone [id number]

## 2017-12-12 NOTE — BEHAVIORAL HEALTH ASSESSMENT NOTE - RISK ASSESSMENT
The patient is at low risk of self-harm/harm to others at this time. She indicates no passive/active SI or HI. Her risk factors include an active mental health issue (panic disorder) and acute medical illness. Her protective factors include orientation towards future, responsibility to her  child, supportive family, engagement in treatment and medication compliance.

## 2017-12-12 NOTE — BEHAVIORAL HEALTH ASSESSMENT NOTE - DESCRIPTION (FIRST USE, LAST USE, QUANTITY, FREQUENCY, DURATION)
Initially prescribed for anxiety, overuse in combination with alcohol Daily alcohol use from [age] to the point of blacking out

## 2017-12-12 NOTE — DIETITIAN INITIAL EVALUATION ADULT. - PROBLEM SELECTOR PLAN 1
1) Neuro: HA: follow up CT Head.  Tylenol 975mg PRN q 6 hours                       s/p Mg on 12/5-12/6.  Would not re-Mag.  2) CV: HTN: s/p IV Hydral 10mg + 5mg.  Continue Procardia 30mg XL qd and Labetalol 300mg TID for maintanence.  Titrate up as needed to attain goal /90s.  Monitor VS and I/Os.   Follow up HELLP labs.  Cardiology consult.  3) GI: Regular diet.  GI reflux: pepcid 20mg daily  4) Pulm: O2 sats stable.  5) Heme: DVT prophylaxis: Lovenox 40mg daily.  Ambulation  6) Psych: Anxiety: continue Xanax    d/w Dr. Thuan Hargrove, PGY4

## 2017-12-12 NOTE — BEHAVIORAL HEALTH ASSESSMENT NOTE - PAST PSYCHOTROPIC MEDICATION
Trial of zoloft many years ago without effect Trial of zoloft many years ago without effect - but pt feels her alcohol use may have prevented it from being effective

## 2017-12-12 NOTE — BEHAVIORAL HEALTH ASSESSMENT NOTE - OTHER PAST PSYCHIATRIC HISTORY (INCLUDE DETAILS REGARDING ONSET, COURSE OF ILLNESS, INPATIENT/OUTPATIENT TREATMENT)
Diagnosed with anxiety and OCD at the age of 20. Ruminations described about medical problems/illness, no compulsions described, started on lexapro 2 years ago by Dr. Stuart (symptoms controlled with 25 mg daily, [phone]), has seen  psychologist Dr. James Alva ([phone], [phone]). No history of inpatient psychiatric hospitalizations (2 ED visits for panic attacks in college).

## 2017-12-12 NOTE — PROGRESS NOTE ADULT - SUBJECTIVE AND OBJECTIVE BOX
[facility] DIVISION OF KIDNEY DISEASES AND HYPERTENSION -- FOLLOW UP NOTE  --------------------------------------------------------------------------------  Cole Dill     --------------------------------------------------------------------------------  Chief Complaint: Hypertensive emergency    24 hour events/subjective:  no acute events noted.  BP controlled.       PAST HISTORY  --------------------------------------------------------------------------------  No significant changes to PMH, PSH, FHx, SHx, unless otherwise noted    ALLERGIES & MEDICATIONS  --------------------------------------------------------------------------------  Allergies    No Known Allergies    Intolerances      Standing Inpatient Medications  acetaminophen   Tablet. 650 milliGRAM(s) Oral every 6 hours  escitalopram 10 milliGRAM(s) Oral daily  famotidine    Tablet 20 milliGRAM(s) Oral daily  hydrALAZINE 25 milliGRAM(s) Oral every 6 hours  NIFEdipine XL 60 milliGRAM(s) Oral daily  nitrofurantoin (MACROBID) 100 milliGRAM(s) Oral two times a day with meals  prenatal multivitamin 1 Tablet(s) Oral daily    PRN Inpatient Medications  ALPRAZolam 1 milliGRAM(s) Oral every 6 hours PRN      REVIEW OF SYSTEMS  --------------------------------------------------------------------------------  Review Of Systems:  Constitutional: [ ] Fever [ ] Chills [ ] Fatigue [ ] Weight change   HEENT: [ ] Blurred vision [ ] Eye Pain [ ] Headache [ ] Runny nose [ ] Sore Throat   Respiratory: [ ] Cough [ ] Wheezing [ ] Shortness of breath  Cardiovascular: [ ] Chest Pain [ ] Palpitations [ ] ALFARO [ ] PND [ ] Orthopnea  Gastrointestinal: [ ] Abdominal Pain [ ] Diarrhea [ ] Constipation [ ] Hemorrhoids [ ] Nausea [ ] Vomiting  Genitourinary: [ ] Nocturia [ ] Dysuria [ ] Incontinence  Extremities: [ ] Swelling [ ] Joint Pain  Neurologic: [ ] Focal deficit [ ] Paresthesias [ ] Syncope  Lymphatic: [ ] Swelling [ ] Lymphadenopathy   Skin: [ ] Rash [ ] Ecchymoses [ ] Wounds [ ] Lesions  Psychiatry: [ ] Depression [ ] Suicidal/Homicidal Ideation [ ] Anxiety [ ] Sleep Disturbances  [x ] 10 point review of systems is otherwise negative except as mentioned above       [ ]Unable to obtain    All other systems were reviewed and are negative, except as noted.    VITALS/PHYSICAL EXAM  --------------------------------------------------------------------------------  T(C): 36.7 (17 @ 08:16), Max: 36.8 (17 @ 05:45)  HR: 73 (17 @ 08:16) (70 - 80)  BP: 113/76 (17 @ 08:16) (110/70 - 130/74)  RR: 16 (17 @ 08:16) (16 - 18)  SpO2: 98% (17 @ 05:45) (96% - 98%)  Wt(kg): --      Daily     Daily Weight in k.6 (12 Dec 2017 10:19)  I&O's Summary          Physical Exam:              Gen: NAD   	HEENT: anicteric  	Pulm: CTA B/L   	CV: RRR  	Back: No dependent edema  	Abd: soft, nontender, nondistended  	: No villarreal  	LE: Warm, no edema  	Neuro: no asterixis  	Skin: Warm, without rashes  	Vascular access: none    LABS/STUDIES  --------------------------------------------------------------------------------              13.9   6.2   >-----------<  348      [17 06:38]              40.5     136  |  98  |  11  ----------------------------<  90      [12-10-17 @ 18:18]  4.0   |  22  |  0.60        Ca     9.7     [12-10-17 @ 18:18]      Mg     2.0     [12-10-17 @ 18:18]      Phos  3.6     [12-10-17 @ 18:18]    TPro  7.5  /  Alb  4.2  /  TBili  0.2  /  DBili  x   /  AST  21  /  ALT  38  /  AlkPhos  132  [12-10-17 @ 18:18]    PT/INR: PT 12.0 , INR 1.10       [17 06:38]  PTT: 34.5       [17 06:38]    Uric acid 4.5      [17:38]        [12-10-17 @ 18:18]    Creatinine Trend:  SCr 0.60 [12-10 @ 18:18]  SCr 0.68 [ @ 13:48]  SCr 0.62 [ 07:09]  SCr 0.60 [ 11:42]  SCr 0.74 [ @ 10:08]    Urinalysis - [17 14:16]      Color Colorless / Appearance Clear / SG 1.006 / pH 6.5      Gluc Negative / Ketone Negative  / Bili Negative / Urobili Negative       Blood Moderate / Protein Negative / Leuk Est Large / Nitrite Negative      RBC 2-5 / WBC 10-25 / Hyaline  / Gran  / Sq Epi  / Non Sq Epi  / Bacteria Few    Urine Creatinine 22      [17 14:16]  Urine Sodium <20      [17 14:16]  Urine Potassium 9      [17 14:16]  Urine Chloride <20      [17 14:16]  Urine Phosphorus 28.7      [17 15:14]  Urine Osmolality 133      [17 14:16]    TSH 0.67      [17 07:42]      Syphilis Screen (Treponema Pallidum Ab) Negative      [17 @ 17:02]      Radiology  --------------------------------------------------------------------------------    --------------------------------------------------------------------------------  Cole Dill

## 2017-12-12 NOTE — BEHAVIORAL HEALTH ASSESSMENT NOTE - CASE SUMMARY
26 y/o white woman, history of anxiety and OCD, substance use in remission, no history of psych hospitalization, no PMHx, currently admitted for hypertensive episodes (HELLP?) who was consulted for assistance with increased anxiety since the birth of her child. Pt finds her anxiety has increased markedly in context of being worried that her HTN will recur, and she will be unable to take care of her child. Some anxiety around bonding properly with her baby, but no homicidality towards child or psychosis. Pt discussing Xanax use with her AA sponsor, can use for anxiety, but would start Seroquel to help relieve pt's symptoms and decrease need for Xanax. Lexapro was restarted recently. No need for inpt psych, no safety concern about pt and her child.

## 2017-12-12 NOTE — BEHAVIORAL HEALTH ASSESSMENT NOTE - NSBHCHARTREVIEWVS_PSY_A_CORE FT
Vital Signs Last 24 Hrs  T(C): 36.7 (12 Dec 2017 11:16), Max: 36.8 (12 Dec 2017 05:45)  T(F): 98.1 (12 Dec 2017 11:16), Max: 98.2 (12 Dec 2017 05:45)  HR: 78 (12 Dec 2017 11:16) (70 - 78)  BP: 132/90 (12 Dec 2017 11:16) (110/70 - 132/90)  BP(mean): --  RR: 16 (12 Dec 2017 11:16) (16 - 18)  SpO2: 98% (12 Dec 2017 11:16) (96% - 98%)

## 2017-12-12 NOTE — BEHAVIORAL HEALTH ASSESSMENT NOTE - NSBHCONSULTMEDS_PSY_A_CORE FT
Continue home Lexapro- currently ordered for 10 mg daily (home medication dose listed at 20 mg daily, awaiting call back from Dr. Riggs regarding previous treatment)  Seroquel 25 mg qHS for insomnia/anxiety

## 2017-12-12 NOTE — BEHAVIORAL HEALTH ASSESSMENT NOTE - NSBHCONSULTMEDANXIETY_PSY_A_CORE FT
12.5 mg seroquel q6h for panic attacks/anxiety - first line  xanax 1 mg q6h for panic attacks/anxiety - if seroquel is ineffective (please limit due to patient's substance abuse history)

## 2017-12-13 DIAGNOSIS — N30.00 ACUTE CYSTITIS WITHOUT HEMATURIA: ICD-10-CM

## 2017-12-13 DIAGNOSIS — O14.90 UNSPECIFIED PRE-ECLAMPSIA, UNSPECIFIED TRIMESTER: ICD-10-CM

## 2017-12-13 DIAGNOSIS — T78.40XA ALLERGY, UNSPECIFIED, INITIAL ENCOUNTER: ICD-10-CM

## 2017-12-13 LAB — GLUCOSE BLDC GLUCOMTR-MCNC: 109 MG/DL — HIGH (ref 70–99)

## 2017-12-13 PROCEDURE — 99233 SBSQ HOSP IP/OBS HIGH 50: CPT | Mod: GC

## 2017-12-13 PROCEDURE — 31575 DIAGNOSTIC LARYNGOSCOPY: CPT

## 2017-12-13 PROCEDURE — 93010 ELECTROCARDIOGRAM REPORT: CPT

## 2017-12-13 PROCEDURE — 99233 SBSQ HOSP IP/OBS HIGH 50: CPT

## 2017-12-13 PROCEDURE — 99253 IP/OBS CNSLTJ NEW/EST LOW 45: CPT | Mod: 25

## 2017-12-13 RX ORDER — EPINEPHRINE 0.3 MG/.3ML
0.3 INJECTION INTRAMUSCULAR; SUBCUTANEOUS ONCE
Qty: 0 | Refills: 0 | Status: DISCONTINUED | OUTPATIENT
Start: 2017-12-13 | End: 2017-12-13

## 2017-12-13 RX ORDER — ALPRAZOLAM 0.25 MG
1 TABLET ORAL EVERY 6 HOURS
Qty: 0 | Refills: 0 | Status: DISCONTINUED | OUTPATIENT
Start: 2017-12-13 | End: 2017-12-14

## 2017-12-13 RX ORDER — DIPHENHYDRAMINE HCL 50 MG
25 CAPSULE ORAL EVERY 6 HOURS
Qty: 0 | Refills: 0 | Status: DISCONTINUED | OUTPATIENT
Start: 2017-12-13 | End: 2017-12-14

## 2017-12-13 RX ORDER — ALPRAZOLAM 0.25 MG
0.25 TABLET ORAL EVERY 6 HOURS
Qty: 0 | Refills: 0 | Status: DISCONTINUED | OUTPATIENT
Start: 2017-12-13 | End: 2017-12-13

## 2017-12-13 RX ADMIN — Medication 100 MILLIGRAM(S): at 10:58

## 2017-12-13 RX ADMIN — Medication 650 MILLIGRAM(S): at 05:22

## 2017-12-13 RX ADMIN — Medication 200 MILLIGRAM(S): at 22:16

## 2017-12-13 RX ADMIN — Medication 0.5 MILLIGRAM(S): at 14:50

## 2017-12-13 RX ADMIN — Medication 1 MILLIGRAM(S): at 19:15

## 2017-12-13 RX ADMIN — Medication 60 MILLIGRAM(S): at 06:24

## 2017-12-13 RX ADMIN — QUETIAPINE FUMARATE 12.5 MILLIGRAM(S): 200 TABLET, FILM COATED ORAL at 12:59

## 2017-12-13 RX ADMIN — Medication 200 MILLIGRAM(S): at 06:24

## 2017-12-13 RX ADMIN — ESCITALOPRAM OXALATE 10 MILLIGRAM(S): 10 TABLET, FILM COATED ORAL at 22:16

## 2017-12-13 RX ADMIN — Medication 25 MILLIGRAM(S): at 22:16

## 2017-12-13 RX ADMIN — Medication 25 MILLIGRAM(S): at 11:03

## 2017-12-13 RX ADMIN — ESCITALOPRAM OXALATE 10 MILLIGRAM(S): 10 TABLET, FILM COATED ORAL at 00:26

## 2017-12-13 RX ADMIN — Medication 650 MILLIGRAM(S): at 05:59

## 2017-12-13 RX ADMIN — Medication 100 MILLIGRAM(S): at 18:23

## 2017-12-13 RX ADMIN — FAMOTIDINE 20 MILLIGRAM(S): 10 INJECTION INTRAVENOUS at 14:39

## 2017-12-13 NOTE — PROGRESS NOTE ADULT - SUBJECTIVE AND OBJECTIVE BOX
PPD#12- ATTENDING NOTE    S:   Patient still has flashes of light in her vision. She has a headache that is located at the right posterior aspect of her head.  She is anxious.  O: Vital Signs Last 24 Hrs  T(C): 36.6 (13 Dec 2017 08:48), Max: 36.7 (13 Dec 2017 05:24)  T(F): 97.8 (13 Dec 2017 08:48), Max: 98 (13 Dec 2017 05:24)  HR: 85 (13 Dec 2017 11:02) (71 - 95)  BP: 120/81 (13 Dec 2017 11:02) (113/73 - 132/86)  BP(mean): --  RR: 18 (13 Dec 2017 08:48) (18 - 18)  SpO2: 96% (13 Dec 2017 08:48) (96% - 97%)    Gen: NAD  Abd: soft, NT, ND, fundus firm below umbilicus  Lochia: moderate  Ext: no tenderness, no hyper reflexia, no edema    Labs:      A:     Office [phone]  Dr. Cespedes

## 2017-12-13 NOTE — PROGRESS NOTE BEHAVIORAL HEALTH - NSBHCONSULTMEDANXIETY_PSY_A_CORE FT
12.5 mg seroquel q6h for panic attacks/anxiety - first line  xanax 1 mg q6h for panic attacks/anxiety - if seroquel is ineffective (please limit due to patient's substance abuse history) 12.5 mg seroquel q6h for panic attacks/anxiety - first line  xanax 0.5 mg q6h PRN for panic attacks/anxiety - if seroquel is ineffective (please limit due to patient's substance abuse history)

## 2017-12-13 NOTE — CHART NOTE - NSCHARTNOTEFT_GEN_A_CORE
R3 Postpartum Event Note:    S: Called to the bedside to evaluate patient for Tachycardia of 166 and chest tightness. Patient had just taken 12.5mg Seroquel PO as prescribed by psychiatry for anxiety. Patient c/o unilateral right sided face numbness which then spread to both her UA, LE and both sides of her face. Then she began to complain of inability to breath, chest tightness and her throat closing up. She then began complaining that she was going to pass out and that that she 'felt like she was dying'. EKG performed at the bedside was sinus tachycardia , and continuous VS monitoring showed BPs 146/80, 130/90. 02 sats 97-99% on room air    O:  Vitals  ICU Vital Signs Last 24 Hrs  T(C): 36.2 (13 Dec 2017 14:29), Max: 36.7 (13 Dec 2017 05:24)  T(F): 97.2 (13 Dec 2017 14:29), Max: 98 (13 Dec 2017 05:24)  HR: 120 (13 Dec 2017 14:29) (71 - 120)  BP: 135/90 (13 Dec 2017 14:29) (113/73 - 135/90)  RR: 20 (13 Dec 2017 14:29) (18 - 20)  SpO2: 97% (13 Dec 2017 14:29) (96% - 97%)    Physical exam:  GEN: appears to be flushed in both cheeks bilaterally; appears very anxious  CV tachycardic rhythm and rate  Pulm CTABL  Abd soft ND NT  Extr NTBL  Neuro: facial nerve intact, symmetrical smile, equal strength bilaterally in UE and LE      Interval events: There was a low level of suspicion for stroke given physical exam findings. Psychiatry paged stat and came to the bedside to evaluate for possible adverse reaction to Seroquel and for acute dystonic reaction. Their assessment was that this could not be a response to Seroquel. Given that patient complaint of inability to breath, throat tightening and chest tightness a medical rapid response was called. Patient was assessed by medicine team and ENT. Airway was found to be clear without edema and she was cleared medically by the RRT. Patient received Ativan IVP 0.25mg and 50mg IVP of benadryl. For further details please see RRT, Psych and ENT notes. Psych at the bedside feels that this was largely a panic attack triggered by taking a new medication. Their recommendation is to discontinue the Seroquel and use benadryl 25mg IVP q6h PRN and Xanax 0.25mg q6hrs PRN.    Heena, R3 d/w Dr. Cullen. [facility] R4 and Dr. [facility] attending who were at the bedside

## 2017-12-13 NOTE — PROGRESS NOTE BEHAVIORAL HEALTH - CASE SUMMARY
Pt is a 26 y/o WF with hx of anxiety and past substance abuse history, presents with increased BP, ?pre-eclampsia, present with anxiety attacks. Pt has an outpt psychiatrist and psychologist for outside management of her anxiety symptoms. While in hospital, pt agreeable to take seroquel 12.5 mg Q6hr PRN for anxiety and 25 mg qhs standing for insomnia/anxiety. Continue with lexapro. No si/hi, and no need for inpt psychiatric admission. Psych to sign off. May call with any questions/concerns.

## 2017-12-13 NOTE — PROGRESS NOTE ADULT - SUBJECTIVE AND OBJECTIVE BOX
24H hour events:   BP well controlled. No headache, blurry vision  MEDICATIONS:  hydrALAZINE 25 milliGRAM(s) Oral every 12 hours  NIFEdipine XL 60 milliGRAM(s) Oral daily    nitrofurantoin (MACROBID) 100 milliGRAM(s) Oral two times a day with meals      acetaminophen   Tablet. 650 milliGRAM(s) Oral every 6 hours  escitalopram 10 milliGRAM(s) Oral daily  QUEtiapine 25 milliGRAM(s) Oral at bedtime  QUEtiapine 12.5 milliGRAM(s) Oral every 6 hours PRN    famotidine    Tablet 20 milliGRAM(s) Oral daily      phenazopyridine 200 milliGRAM(s) Oral every 8 hours  prenatal multivitamin 1 Tablet(s) Oral daily      PHYSICAL EXAM:  T(C): 36.7 (17 @ 05:24), Max: 36.7 (17 @ 08:16)  HR: 78 (17 @ 05:24) (71 - 95)  BP: 125/81 (17 @ 05:24) (110/70 - 132/90)  RR: 18 (17 @ 05:24) (16 - 18)  SpO2: 96% (17 @ 05:24) (96% - 98%)  Wt(kg): --  I&O's Summary      Appearance: Normal	  HEENT:   Normal oral mucosa, PERRL, EOMI	  Lymphatic: No lymphadenopathy  Cardiovascular: Normal S1 S2, No JVD, No murmurs, No edema  Respiratory: Lungs clear to auscultation	  Psychiatry: A & O x 3, Mood & affect appropriate  Gastrointestinal:  Soft, Non-tender, + BS	  Skin: No rashes, No ecchymoses, No cyanosis	  Neurologic: Non-focal  Extremities: Normal range of motion, No clubbing, cyanosis or edema  Vascular: Peripheral pulses palpable 2+ bilaterally        LABS:	 	    CBC Full  -  ( 11 Dec 2017 06:38 )  WBC Count : 6.2 K/uL  Hemoglobin : 13.9 g/dL  Hematocrit : 40.5 %  Platelet Count - Automated : 348 K/uL  Mean Cell Volume : 88.9 fl  Mean Cell Hemoglobin : 30.5 pg  Mean Cell Hemoglobin Concentration : 34.3 gm/dL  Auto Neutrophil # : 4.2 K/uL  Auto Lymphocyte # : 1.4 K/uL  Auto Monocyte # : 0.5 K/uL  Auto Eosinophil # : 0.1 K/uL  Auto Basophil # : 0.0 K/uL  Auto Neutrophil % : 68.5 %  Auto Lymphocyte % : 21.7 %  Auto Monocyte % : 7.4 %  Auto Eosinophil % : 1.8 %  Auto Basophil % : 0.6 %    PREVIOUS DIAGNOSTIC TESTING:    [ ] Echocardiogram:  < from: Transthoracic Echocardiogram (17 @ 18:29) >  Conclusions:  1. Normal left ventricular systolic function. No segmental  wall motion abnormalities. LVEF 60-65%.  2. Normal diastolic function  3. Normal right ventricular size and function.  4. No pericardial effusion seen.  *** No previous Echo exam. Technically difficult study due  to body habitus (pregnancy and breast implants).    < end of copied text >    	  ASSESSMENT/PLAN: 	  27F  PPD#1 sp VAVD on  c/b postpartum preeclampsia presenting today with postpartum preeclampsia w/ bp to 200/110s, with headaches, blurry vision, and dyspnea. CTH negative.     1. hypertensive emergency  -BP well controlled on nifedipine and hydralazine at current doses. Hydralazine has the propensity to cause rebound tachycardia. Low dose beta blocker would prevent this  -Renin/aldosterone and renal artery ultrasound are not abnormal. F/U metanephrines     Kenneth Malone [id number]

## 2017-12-13 NOTE — PROGRESS NOTE ADULT - SUBJECTIVE AND OBJECTIVE BOX
INTERVAL HPI/OVERNIGHT EVENTS: Pt seen and examined at bedside.  No acute events overnight.  No HA, blurry vision, CP/SOB, RUQ pain, epigastric pain. OOB, tolerating PO, voiding.     MEDICATIONS  (STANDING):  acetaminophen   Tablet. 650 milliGRAM(s) Oral every 6 hours  escitalopram 10 milliGRAM(s) Oral daily  famotidine    Tablet 20 milliGRAM(s) Oral daily  hydrALAZINE 25 milliGRAM(s) Oral every 12 hours  NIFEdipine XL 60 milliGRAM(s) Oral daily  nitrofurantoin (MACROBID) 100 milliGRAM(s) Oral two times a day with meals  phenazopyridine 200 milliGRAM(s) Oral every 8 hours  prenatal multivitamin 1 Tablet(s) Oral daily  QUEtiapine 25 milliGRAM(s) Oral at bedtime    MEDICATIONS  (PRN):  QUEtiapine 12.5 milliGRAM(s) Oral every 6 hours PRN panic attack      Allergies    No Known Allergies    Intolerances        12 point ROS negative except as outlined above    Vital Signs Last 24 Hrs  T(C): 36.4 (12 Dec 2017 23:19), Max: 36.8 (12 Dec 2017 05:45)  T(F): 97.5 (12 Dec 2017 23:19), Max: 98.2 (12 Dec 2017 05:45)  HR: 71 (12 Dec 2017 23:19) (71 - 95)  BP: 122/79 (12 Dec 2017 23:19) (110/70 - 132/90)  BP(mean): --  RR: 18 (12 Dec 2017 23:19) (16 - 18)  SpO2: 96% (12 Dec 2017 23:19) (96% - 98%)      PHYSICAL EXAM:    GA: NAD, A+0 x 3  CV: RRR  Pulm: CTA BL  Abd: BS, soft, nontender, nondistended, no rebound or guarding,   Extremities: no swelling or calf tenderness, reflexes +2 bilaterally          LABS:             13.9   6.2   )-----------( 348      (  @ 06:38 )             40.5                14.5   7.3   )-----------( 378      ( 12-10 @ 18:18 )             40.9       12-10 @ 18:18    136  |  98  |  11  ----------------------------<  90  4.0   |  22  |  0.60      Phos  3.6     12-10 @ 18:18  Mg     2.0     12-10 @ 18:18    TPro  7.5  /  Alb  4.2  /  TBili  0.2  /  DBili  x   /  AST  21  /  ALT  38  /  AlkPhos  132  12-10 @ 18:18    PT/INR - ( 11 Dec 2017 06:38 )   PT: 12.0 sec;   INR: 1.10 ratio         PTT - ( 11 Dec 2017 06:38 )  PTT:34.5 sec  Urinalysis Basic - ( 11 Dec 2017 14:16 )    Color: Colorless / Appearance: Clear / S.006 / pH: x  Gluc: x / Ketone: Negative  / Bili: Negative / Urobili: Negative   Blood: x / Protein: Negative / Nitrite: Negative   Leuk Esterase: Large / RBC: 2-5 /HPF / WBC 10-25 /HPF   Sq Epi: x / Non Sq Epi: x / Bacteria: Few /HPF        RADIOLOGY & ADDITIONAL TESTS:

## 2017-12-13 NOTE — CONSULT NOTE ADULT - ASSESSMENT
28yo female with c/o difficulty breathing and throat closing s/p administration of seroquel. FOE with no evidence of edema or anaphylactic response.

## 2017-12-13 NOTE — CONSULT NOTE ADULT - SUBJECTIVE AND OBJECTIVE BOX
CC: Sensation of throat closing     HPI: 28yo female with PMHx anxiety, OCD, eclampsia, who is 12 days post-partum, admitted for post-partum preeclampsia, c/o sensation that her throat is closing. After pt was administered Seroquel for treatment of anxiety, she began feeling flush and sensation that her throat was closing. She states she "can't breath" and feels like she is "going to have a heart attack." Pt had elevated HR and BP upon ENT arrival. O2 sats stable. Pt denies dysphagia, odynophagia, inability to tolerate secretions, hoarseness, cough.       PAST MEDICAL & SURGICAL HISTORY:  Anxiety  Obsessive-compulsive disorder, unspecified type  Eclampsia  H/O breast augmentation    Allergies    No Known Allergies    Intolerances      MEDICATIONS  (STANDING):  acetaminophen   Tablet. 650 milliGRAM(s) Oral every 6 hours  escitalopram 10 milliGRAM(s) Oral daily  famotidine    Tablet 20 milliGRAM(s) Oral daily  hydrALAZINE 25 milliGRAM(s) Oral every 12 hours  LORazepam   Injectable 0.5 milliGRAM(s) IntraMuscular once  NIFEdipine XL 60 milliGRAM(s) Oral daily  nitrofurantoin (MACROBID) 100 milliGRAM(s) Oral two times a day with meals  phenazopyridine 200 milliGRAM(s) Oral every 8 hours  prenatal multivitamin 1 Tablet(s) Oral daily  QUEtiapine 25 milliGRAM(s) Oral at bedtime    MEDICATIONS  (PRN):  QUEtiapine 12.5 milliGRAM(s) Oral every 6 hours PRN panic attack      Social History: denies tobacco use    ROS: ENT, GI, , CV, Pulm, Neuro, Psych, MS, Heme, Endo, Constitutional; all negative except as noted in HPI    Vital Signs Last 24 Hrs  T(C): 36.2 (13 Dec 2017 14:29), Max: 36.7 (13 Dec 2017 05:24)  T(F): 97.2 (13 Dec 2017 14:29), Max: 98 (13 Dec 2017 05:24)  HR: 120 (13 Dec 2017 14:29) (71 - 120)  BP: 135/90 (13 Dec 2017 14:29) (113/73 - 135/90)  BP(mean): --  RR: 20 (13 Dec 2017 14:29) (18 - 20)  SpO2: 97% (13 Dec 2017 14:29) (96% - 97%)         PHYSICAL EXAM:  Gen: anxious, flush-appearance  Head: Normocephalic, Atraumatic  Face: no edema/erythema/fluctuance  Eyes: PERRL, EOMI, no scleral injection  Nose: Nares bilaterally patent, no discharge  Mouth: No Stridor / Drooling / Trismus.  Mucosa moist, tongue/uvula midline, oropharynx clear  Neck: Flat, supple, no lymphadenopathy, trachea midline, no masses  Resp: tachypneic, no stridor  CV: no peripheral edema/cyanosis    Fiberoptic Indirect laryngoscopy:  (With Scope #2)  Nasopharynx, oropharynx, and hypopharynx clear, no bleeding. Airway patent, no foreign body visualized. No erythema, edema, pooling of secretions, masses or lesions. No glottic/supraglottic edema. Vocal cords mobile with good contact b/l.

## 2017-12-13 NOTE — PROGRESS NOTE BEHAVIORAL HEALTH - SUMMARY
Ms. Mojica is a 28 y/o white woman, domiciled with boyfriend and  baby, history of anxiety and OCD (controlled with lexapro prior to pregnancy, now restarted on lexapro), followed by psychiatrist Dr. Sade mace and psychologist James Alva), history of substance use (~5 years of alcohol/benzo use, sober x1.5 years), no history of psych hospitalization, no PMHx, currently admitted for hypertensive episodes (HELLP?) who was consulted for assistance with increased anxiety since the birth of her child. Her symptoms of anxiety, including brief episodes of SOB, chest tightness, impending doom, palpitations that have no concrete trigger and dissipate shortly after initiation, are consistent with panic attacks.    The patient would benefit from continuation of her SSRI lexapro with anti-anxiety medication as needed for severe symptoms of anxiety. Since she isn't breast feeding and has a past history of substance abuse, 25 mg of nightly seroquel with 12.5mg q6h prn should be used as a first line treatment to control her anxiety. If she continues to feel anxious after seroquel, xanax 1 mg q6h prn may given. Ms. Mojica is a 26 y/o white woman, domiciled with boyfriend and  baby, history of anxiety and OCD (controlled with lexapro prior to pregnancy, now restarted on lexapro), followed by psychiatrist Dr. Sade mace and psychologist James Alva), history of substance use (~5 years of alcohol/benzo use, sober x1.5 years), no history of psych hospitalization, no PMHx, currently admitted for hypertensive episodes (HELLP?) who was consulted for assistance with increased anxiety since the birth of her child. Her symptoms of anxiety, including brief episodes of SOB, chest tightness, impending doom, palpitations that have no concrete trigger and dissipate shortly after initiation, are consistent with panic attacks.    The patient would benefit from continuation of her SSRI lexapro with anti-anxiety medication as needed for severe symptoms of anxiety. Since she isn't breast feeding and has a past history of substance abuse, 25 mg of nightly seroquel with 12.5mg q6h prn should be used as a first line treatment to control her anxiety. If she continues to feel anxious after seroquel, xanax 0.5 mg q6h prn may given.

## 2017-12-14 ENCOUNTER — TRANSCRIPTION ENCOUNTER (OUTPATIENT)
Age: 27
End: 2017-12-14

## 2017-12-14 VITALS
TEMPERATURE: 98 F | DIASTOLIC BLOOD PRESSURE: 87 MMHG | HEART RATE: 83 BPM | RESPIRATION RATE: 18 BRPM | OXYGEN SATURATION: 95 % | SYSTOLIC BLOOD PRESSURE: 126 MMHG

## 2017-12-14 PROBLEM — F42.9 OBSESSIVE-COMPULSIVE DISORDER, UNSPECIFIED: Chronic | Status: ACTIVE | Noted: 2017-12-10

## 2017-12-14 PROBLEM — F41.9 ANXIETY DISORDER, UNSPECIFIED: Chronic | Status: ACTIVE | Noted: 2017-12-10

## 2017-12-14 PROBLEM — O15.9 ECLAMPSIA, UNSPECIFIED AS TO TIME PERIOD: Chronic | Status: ACTIVE | Noted: 2017-12-10

## 2017-12-14 LAB — RENIN DIRECT, PLASMA: 23 PG/ML — SIGNIFICANT CHANGE UP

## 2017-12-14 PROCEDURE — 84100 ASSAY OF PHOSPHORUS: CPT

## 2017-12-14 PROCEDURE — 70450 CT HEAD/BRAIN W/O DYE: CPT

## 2017-12-14 PROCEDURE — 83835 ASSAY OF METANEPHRINES: CPT

## 2017-12-14 PROCEDURE — 85027 COMPLETE CBC AUTOMATED: CPT

## 2017-12-14 PROCEDURE — 81001 URINALYSIS AUTO W/SCOPE: CPT

## 2017-12-14 PROCEDURE — 84436 ASSAY OF TOTAL THYROXINE: CPT

## 2017-12-14 PROCEDURE — 83615 LACTATE (LD) (LDH) ENZYME: CPT

## 2017-12-14 PROCEDURE — 83935 ASSAY OF URINE OSMOLALITY: CPT

## 2017-12-14 PROCEDURE — 82962 GLUCOSE BLOOD TEST: CPT

## 2017-12-14 PROCEDURE — 93975 VASCULAR STUDY: CPT

## 2017-12-14 PROCEDURE — 84105 ASSAY OF URINE PHOSPHORUS: CPT

## 2017-12-14 PROCEDURE — 80307 DRUG TEST PRSMV CHEM ANLYZR: CPT

## 2017-12-14 PROCEDURE — 93005 ELECTROCARDIOGRAM TRACING: CPT

## 2017-12-14 PROCEDURE — 85610 PROTHROMBIN TIME: CPT

## 2017-12-14 PROCEDURE — 84133 ASSAY OF URINE POTASSIUM: CPT

## 2017-12-14 PROCEDURE — 99291 CRITICAL CARE FIRST HOUR: CPT | Mod: 25

## 2017-12-14 PROCEDURE — 99233 SBSQ HOSP IP/OBS HIGH 50: CPT | Mod: GC

## 2017-12-14 PROCEDURE — 87086 URINE CULTURE/COLONY COUNT: CPT

## 2017-12-14 PROCEDURE — 80053 COMPREHEN METABOLIC PANEL: CPT

## 2017-12-14 PROCEDURE — 96374 THER/PROPH/DIAG INJ IV PUSH: CPT

## 2017-12-14 PROCEDURE — 84550 ASSAY OF BLOOD/URIC ACID: CPT

## 2017-12-14 PROCEDURE — 82436 ASSAY OF URINE CHLORIDE: CPT

## 2017-12-14 PROCEDURE — 96375 TX/PRO/DX INJ NEW DRUG ADDON: CPT

## 2017-12-14 PROCEDURE — 84244 ASSAY OF RENIN: CPT

## 2017-12-14 PROCEDURE — 84480 ASSAY TRIIODOTHYRONINE (T3): CPT

## 2017-12-14 PROCEDURE — 86850 RBC ANTIBODY SCREEN: CPT

## 2017-12-14 PROCEDURE — 83735 ASSAY OF MAGNESIUM: CPT

## 2017-12-14 PROCEDURE — 85730 THROMBOPLASTIN TIME PARTIAL: CPT

## 2017-12-14 PROCEDURE — 83497 ASSAY OF 5-HIAA: CPT

## 2017-12-14 PROCEDURE — 82533 TOTAL CORTISOL: CPT

## 2017-12-14 PROCEDURE — 82570 ASSAY OF URINE CREATININE: CPT

## 2017-12-14 PROCEDURE — 82088 ASSAY OF ALDOSTERONE: CPT

## 2017-12-14 PROCEDURE — 84300 ASSAY OF URINE SODIUM: CPT

## 2017-12-14 PROCEDURE — 84443 ASSAY THYROID STIM HORMONE: CPT

## 2017-12-14 PROCEDURE — 86900 BLOOD TYPING SEROLOGIC ABO: CPT

## 2017-12-14 PROCEDURE — 86901 BLOOD TYPING SEROLOGIC RH(D): CPT

## 2017-12-14 PROCEDURE — 85384 FIBRINOGEN ACTIVITY: CPT

## 2017-12-14 RX ORDER — HYDRALAZINE HCL 50 MG
1 TABLET ORAL
Qty: 0 | Refills: 0 | DISCHARGE
Start: 2017-12-14

## 2017-12-14 RX ORDER — NIFEDIPINE 30 MG
1 TABLET, EXTENDED RELEASE 24 HR ORAL
Qty: 0 | Refills: 0 | DISCHARGE
Start: 2017-12-14

## 2017-12-14 RX ORDER — ESCITALOPRAM OXALATE 10 MG/1
1 TABLET, FILM COATED ORAL
Qty: 0 | Refills: 0 | DISCHARGE
Start: 2017-12-14

## 2017-12-14 RX ORDER — ALPRAZOLAM 0.25 MG
1 TABLET ORAL
Qty: 0 | Refills: 0 | DISCHARGE
Start: 2017-12-14

## 2017-12-14 RX ORDER — ACETAMINOPHEN 500 MG
2 TABLET ORAL
Qty: 0 | Refills: 0 | DISCHARGE
Start: 2017-12-14

## 2017-12-14 RX ADMIN — Medication 1 TABLET(S): at 11:19

## 2017-12-14 RX ADMIN — Medication 25 MILLIGRAM(S): at 11:19

## 2017-12-14 RX ADMIN — Medication 1 MILLIGRAM(S): at 03:24

## 2017-12-14 RX ADMIN — FAMOTIDINE 20 MILLIGRAM(S): 10 INJECTION INTRAVENOUS at 11:19

## 2017-12-14 RX ADMIN — Medication 100 MILLIGRAM(S): at 09:34

## 2017-12-14 RX ADMIN — Medication 650 MILLIGRAM(S): at 03:25

## 2017-12-14 RX ADMIN — Medication 650 MILLIGRAM(S): at 09:34

## 2017-12-14 RX ADMIN — Medication 1 MILLIGRAM(S): at 11:19

## 2017-12-14 RX ADMIN — Medication 200 MILLIGRAM(S): at 05:20

## 2017-12-14 RX ADMIN — Medication 650 MILLIGRAM(S): at 10:00

## 2017-12-14 RX ADMIN — Medication 60 MILLIGRAM(S): at 05:18

## 2017-12-14 NOTE — DISCHARGE NOTE ADULT - CARE PROVIDER_API CALL
Yesi Vazquez), NSLIJ [facility] at [address]  [place] [address]  Phone: [phone]  Fax: [phone]

## 2017-12-14 NOTE — PROGRESS NOTE ADULT - PROBLEM SELECTOR PROBLEM 1
Other secondary hypertension
Pre-eclampsia, antepartum
Preeclampsia
Other secondary hypertension
Preeclampsia
Preeclampsia

## 2017-12-14 NOTE — PROGRESS NOTE BEHAVIORAL HEALTH - NSBHCONSULTOBSREASON_PSY_A_CORE FT
Patient at low risk of self-harm/harm to others, not endorsing SI/HI.
Patient at low risk of self-harm/harm to others, not endorsing SI/HI.

## 2017-12-14 NOTE — PROGRESS NOTE BEHAVIORAL HEALTH - NSBHCONSULTMEDS_PSY_A_CORE FT
Continue home Lexapro- currently ordered for 10 mg daily (home medication dose listed at 20 mg daily, multiple messages left for outpatient provider Dr. Stuart without response)  0.5 mg xanax q6h prn for anxiety
Continue home Lexapro- currently ordered for 10 mg daily (home medication dose listed at 20 mg daily, awaiting call back from Dr. Riggs regarding previous treatment)  Seroquel 25 mg qHS for insomnia/anxiety

## 2017-12-14 NOTE — PROGRESS NOTE BEHAVIORAL HEALTH - SUMMARY
Ms. Mojica is a 28 y/o white woman, domiciled with boyfriend and  baby, history of anxiety and OCD (controlled with lexapro prior to pregnancy, now restarted on lexapro), followed by psychiatrist Dr. Sade mace and psychologist James Alva), history of substance use (~5 years of alcohol/benzo use, sober x1.5 years), no history of psych hospitalization, no PMHx, currently admitted for hypertensive episodes (HELLP?) who was consulted for assistance with increased anxiety since the birth of her child. Her symptoms of anxiety, including brief episodes of SOB, chest tightness, impending doom, palpitations that have no concrete trigger and dissipate shortly after initiation, are consistent with panic attacks.    The patient would benefit from continuation of her SSRI lexapro with anti-anxiety medication as needed for severe symptoms of anxiety. Seroquel should be discontinued at this time given yesterday's response to 25 mg. It was unlikely allergic (symptom onset >1 hour after administration) or dystonic (no rigidity noted). Please use xanax 0.5 mg q6h prn for anxiety. No contraindications to discharge from a psychiatric perspective at this time.

## 2017-12-14 NOTE — PROGRESS NOTE BEHAVIORAL HEALTH - DETAILS
Patient with panic attack after administration of seroquel, unlikely allergic/dystonic given inconsistent physical symptoms

## 2017-12-14 NOTE — DISCHARGE NOTE ADULT - PLAN OF CARE
Please continue to take medications as above, control BPs. Please call for follow-up with Dr. Vazquez's office in 1 week.  Please she nephrologist in 2 weeks.  Hold BP meds if systolic BP is <110mmHG.  Take blood pressure 4 times per day. Please follow-up with your mental health providers within 2 weeks.

## 2017-12-14 NOTE — PROGRESS NOTE ADULT - SUBJECTIVE AND OBJECTIVE BOX
INTERVAL HPI/OVERNIGHT EVENTS: Pt seen and examined at bedside.  Pt without complaints overnight. No HA, blurry vision, CP/SOB, RUQ pain, epigastric pain. OOB, tolerating PO, voiding. Does not feel anxious this morning. Pain controlled. Bleeding minimal.     MEDICATIONS  (STANDING):  acetaminophen   Tablet. 650 milliGRAM(s) Oral every 6 hours  escitalopram 10 milliGRAM(s) Oral daily  famotidine    Tablet 20 milliGRAM(s) Oral daily  hydrALAZINE 25 milliGRAM(s) Oral every 12 hours  NIFEdipine XL 60 milliGRAM(s) Oral daily  nitrofurantoin (MACROBID) 100 milliGRAM(s) Oral two times a day with meals  phenazopyridine 200 milliGRAM(s) Oral every 8 hours  prenatal multivitamin 1 Tablet(s) Oral daily    MEDICATIONS  (PRN):  ALPRAZolam 1 milliGRAM(s) Oral every 6 hours PRN Anxiety  diphenhydrAMINE   Injectable 25 milliGRAM(s) IV Push every 6 hours PRN Anxiety      Allergies    No Known Allergies    Intolerances        12 point ROS negative except as outlined above    Vital Signs Last 24 Hrs  T(C): 36.6 (14 Dec 2017 05:22), Max: 36.8 (14 Dec 2017 00:48)  T(F): 97.8 (14 Dec 2017 05:22), Max: 98.2 (14 Dec 2017 00:48)  HR: 69 (14 Dec 2017 05:22) (69 - 120)  BP: 114/75 (14 Dec 2017 05:22) (114/75 - 135/90)  BP(mean): --  RR: 16 (14 Dec 2017 05:22) (16 - 22)  SpO2: 96% (14 Dec 2017 05:22) (95% - 98%)    PHYSICAL EXAM:    GA: NAD, A+0 x 3  CV: RRR  Pulm: CTA BL  Abd: soft, nontender, nondistended, no rebound or guarding,   Extremities: no swelling or calf tenderness, reflexes +2 bilaterally          LABS:                RADIOLOGY & ADDITIONAL TESTS:

## 2017-12-14 NOTE — PROGRESS NOTE ADULT - SUBJECTIVE AND OBJECTIVE BOX
24H hour events:   Blood pressure well controlled. No headache, blurry vision  MEDICATIONS:  hydrALAZINE 25 milliGRAM(s) Oral every 12 hours  NIFEdipine XL 60 milliGRAM(s) Oral daily    nitrofurantoin (MACROBID) 100 milliGRAM(s) Oral two times a day with meals    diphenhydrAMINE   Injectable 25 milliGRAM(s) IV Push every 6 hours PRN    acetaminophen   Tablet. 650 milliGRAM(s) Oral every 6 hours  ALPRAZolam 1 milliGRAM(s) Oral every 6 hours PRN  escitalopram 10 milliGRAM(s) Oral daily    famotidine    Tablet 20 milliGRAM(s) Oral daily      phenazopyridine 200 milliGRAM(s) Oral every 8 hours  prenatal multivitamin 1 Tablet(s) Oral daily      REVIEW OF SYSTEMS:  CONSTITUTIONAL: no fevers or chills  EYES/ENT: No visual changes; No vertigo or throat pain  NECK: No pain or stiffness  RESPIRATORY: No cough, wheezing  CARDIOVASCULAR: No chest pain or palpitations  GASTOINTESTINAL: No abdominal or epigastric pain. No nausea, vomiting. No diarrhea. No melena  NEUROLOGICAL: No numbness or weakness  SKIN: No itching, burning, rashes or lesions  All other review of systems is negative unless indicated above    PHYSICAL EXAM:  T(C): 36.6 (17 @ 05:22), Max: 36.8 (17 @ 00:48)  HR: 69 (17 @ 05:22) (69 - 120)  BP: 114/75 (17 @ 05:22) (113/73 - 135/90)  RR: 16 (17 @ 05:22) (16 - 22)  SpO2: 96% (17 @ 05:22) (95% - 98%)  Wt(kg): --  I&O's Summary      Appearance: Normal	  HEENT:   Normal oral mucosa, PERRL, EOMI	  Lymphatic: No lymphadenopathy  Cardiovascular: Normal S1 S2, No JVD, No murmurs, No edema  Respiratory: Lungs clear to auscultation	  Psychiatry: A & O x 3, Mood & affect appropriate  Gastrointestinal:  Soft, Non-tender, + BS	  Skin: No rashes, No ecchymoses, No cyanosis	  Neurologic: Non-focal  Extremities: Normal range of motion, No clubbing, cyanosis or edema  Vascular: Peripheral pulses palpable 2+ bilaterally        PREVIOUS DIAGNOSTIC TESTING:    [ ] Echocardiogram:  < from: Transthoracic Echocardiogram (17 @ 18:29) >  Conclusions:  1. Normal left ventricular systolic function. No segmental  wall motion abnormalities. LVEF 60-65%.  2. Normal diastolic function  3. Normal right ventricular size and function.  4. No pericardial effusion seen.  *** No previous Echo exam. Technically difficult study due  to body habitus (pregnancy and breast implants).    < end of copied text >    	  ASSESSMENT/PLAN: 	  27F  PPD#1 sp VAVD on  c/b postpartum preeclampsia presenting today with postpartum preeclampsia w/ bp to 200/110s, with headaches, blurry vision, and dyspnea. CTH negative.     1. hypertensive emergency  -BP well controlled on nifedipine and hydralazine at current doses. Hydralazine has the propensity to cause rebound tachycardia. Low dose beta blocker would prevent this  - F/U metanephrines     Kenneth Malone [id number]

## 2017-12-14 NOTE — PROGRESS NOTE ADULT - ATTENDING COMMENTS
Patient interviewed, examined and chart reviewed.  Case discussed with fellow.  Agree w/ Assessment and Plan as outlined.  As part of w/u for secondary causes of HTN, would also evaluate for FMD w/ renal doppler    Vance Mina MD [facility]  P: [phone]  Spectra:  [phone]  Office: [phone]
26 y/o  female  PPD#1 s/p VAVD on  complicated by postpartum preeclampsia (s/p Mg 12/3-) with readmission on 10/5 for postpartum PEC (s/p Mg -), presents with severe range BPs (200/110), HA, blurry vision, SOB.     HTN: c/w pre eclampsia given that she is   post partum  Currently with improved BP  Renal sonogram without GEETA  Markell levels OK  please check renin level if it hasn't been sent   F/U plasma metanephrines  Maintain on current regimen as BP is well controlled  Would hold off on the 24 hour urine collection, since she is bleeding and also with a UTI. This can be pursued as an outpatient    UTI:  Om macrobid
28 y/o  female  PPD#1 s/p VAVD on  complicated by postpartum preeclampsia (s/p Mg 12/3-) with readmission on 10/5 for postpartum PEC (s/p Mg -), presents with severe range BPs (200/110), HA, blurry vision, SOB.     HTN: c/w pre eclampsia given that she is  post partum  Had a RRT called yesterday, ?reaction to seroquel as she developed symptoms within 20 minutes of taking the drug  Currently with well controlled BP  Renal sonogram without GEETA  Markell levels OK  F/U  renin level  and plasma metanephrines  Maintain on current regimen as BP is well controlled  Would hold off on the 24 hour urine collection, since she is bleeding and also with a UTI. This can be pursued as an outpatient    UTI:  On macrobid    Patient to follow up with Dr Louis/Fuentes ( O: [phone]) once discharged.
28 y/o  female  PPD#1 s/p VAVD on  complicated by postpartum preeclampsia (s/p Mg 12/3-) with readmission on 10/5 for postpartum PEC (s/p Mg -), presents with severe range BPs (200/110), HA, blurry vision, SOB.     HTN: c/w pre eclampsia given that she is just 10 days post partum  Currently with improved BP  Renal sonogram without GEETA  Await Renin/Markell level  Check plasma metanephrines  would maintain on procardia  Decrease hydralazine to Q12   Would hold off on the 24 hour urine collection, since she is bleeding and also with a UTI. This can be pursued as an outpatient    UTI:  Om macrobid
resident note appreciated  vss  mild h/a but otherwise fine  abdomen benign  I spoke to renal this am and they are concluding their workup.    At present, SAIGE hitchcock is the working dx  If patient remains stable today, would anticipate discharge tomorrow.  patient aware she needs to f/u with cardiology as outpatient to manage her meds
Patient seen and examined.  Agree with above note.    -continue meds as above   -f/u office 1 wk, renal 2 wks   -BP instructions given   -dc macrobid, completed course and ucx neg     All questions answered.     Yesi Vazquez MD

## 2017-12-14 NOTE — DISCHARGE NOTE ADULT - MEDICATION SUMMARY - MEDICATIONS TO CHANGE
I will SWITCH the dose or number of times a day I take the medications listed below when I get home from the hospital:    escitalopram 20 mg oral tablet  -- 1 tab(s) by mouth once a day    NIFEdipine 30 mg oral tablet, extended release  -- 1 tab(s) by mouth once a day (at bedtime)

## 2017-12-14 NOTE — PROGRESS NOTE BEHAVIORAL HEALTH - NSBHFUPINTERVALHXFT_PSY_A_CORE
Ms. Mojica reports having another panic attack overnight. She felt short of breath with chest tightness, numbness/tingling and "disconnected." Her blood pressure was normal at the time. She feels the panic attack was exacerbated by the fact that her xanax orders were discontinued. She did not want to take the seroquel because her mother felt that the hospital is not the right setting to start this new medication. They were unable to get in touch with their outpatient provider Dr. Stuart to obtain his opinion. She currently feels some anxiety this morning, but denies SOB, numbness/tingling and impending sense of death. She is agreeable to trying seroquel, but would like to have the xanax continued as a backup. Patient denies depressed mood, including passive/active SI/HI.
Ms. Mojica reports no complaints this morning. Her anxiety is well controlled and she is trying to understand what happened yesterday. She is open to trying seroquel in the future once she resolves some of her current medical issues. She denies SI/HI and auditory/visual hallucinations.

## 2017-12-14 NOTE — PROGRESS NOTE BEHAVIORAL HEALTH - NSBHCHARTREVIEWVS_PSY_A_CORE FT
Vital Signs Last 24 Hrs  T(C): 36.6 (13 Dec 2017 08:48), Max: 36.7 (13 Dec 2017 05:24)  T(F): 97.8 (13 Dec 2017 08:48), Max: 98 (13 Dec 2017 05:24)  HR: 85 (13 Dec 2017 11:02) (71 - 95)  BP: 120/81 (13 Dec 2017 11:02) (113/73 - 132/86)  BP(mean): --  RR: 18 (13 Dec 2017 08:48) (18 - 18)  SpO2: 96% (13 Dec 2017 08:48) (96% - 97%)
Vital Signs Last 24 Hrs  T(C): 36.4 (14 Dec 2017 11:23), Max: 36.8 (14 Dec 2017 00:48)  T(F): 97.5 (14 Dec 2017 11:23), Max: 98.3 (14 Dec 2017 08:54)  HR: 83 (14 Dec 2017 11:23) (69 - 120)  BP: 126/87 (14 Dec 2017 11:23) (114/75 - 135/90)  BP(mean): --  RR: 18 (14 Dec 2017 11:23) (16 - 22)  SpO2: 95% (14 Dec 2017 11:23) (95% - 98%)

## 2017-12-14 NOTE — PROGRESS NOTE BEHAVIORAL HEALTH - RISK ASSESSMENT
The patient is at low risk of self-harm/harm to others at this time. She indicates no passive/active SI or HI. Her risk factors include an active mental health issue (panic disorder) and acute medical illness. Her protective factors include orientation towards future, responsibility to her  child, supportive family, engagement in treatment and medication compliance.
The patient is at low risk of self-harm/harm to others at this time. She indicates no passive/active SI or HI. Her risk factors include an active mental health issue (panic disorder) and acute medical illness. Her protective factors include orientation towards future, responsibility to her  child, supportive family, engagement in treatment and medication compliance.

## 2017-12-14 NOTE — PROGRESS NOTE ADULT - SUBJECTIVE AND OBJECTIVE BOX
[facility] DIVISION OF KIDNEY DISEASES AND HYPERTENSION -- FOLLOW UP NOTE  --------------------------------------------------------------------------------  Colebertha Dill     --------------------------------------------------------------------------------  Chief Complaint:HTN emergency    24 hour events/subjective:  BP well controlled.   Had a panic attack yesterday. however BP remained fine during her episode.       PAST HISTORY  --------------------------------------------------------------------------------  No significant changes to PMH, PSH, FHx, SHx, unless otherwise noted    ALLERGIES & MEDICATIONS  --------------------------------------------------------------------------------  Allergies    No Known Allergies    Intolerances      Standing Inpatient Medications  acetaminophen   Tablet. 650 milliGRAM(s) Oral every 6 hours  escitalopram 10 milliGRAM(s) Oral daily  famotidine    Tablet 20 milliGRAM(s) Oral daily  hydrALAZINE 25 milliGRAM(s) Oral every 12 hours  NIFEdipine XL 60 milliGRAM(s) Oral daily  nitrofurantoin (MACROBID) 100 milliGRAM(s) Oral two times a day with meals  phenazopyridine 200 milliGRAM(s) Oral every 8 hours  prenatal multivitamin 1 Tablet(s) Oral daily    PRN Inpatient Medications  ALPRAZolam 1 milliGRAM(s) Oral every 6 hours PRN  diphenhydrAMINE   Injectable 25 milliGRAM(s) IV Push every 6 hours PRN      REVIEW OF SYSTEMS  --------------------------------------------------------------------------------  Review Of Systems:  Constitutional: [ ] Fever [ ] Chills [ ] Fatigue [ ] Weight change   HEENT: [ ] Blurred vision [ ] Eye Pain [ ] Headache [ ] Runny nose [ ] Sore Throat   Respiratory: [ ] Cough [ ] Wheezing [ ] Shortness of breath  Cardiovascular: [ ] Chest Pain [ ] Palpitations [ ] ALFARO [ ] PND [ ] Orthopnea  Gastrointestinal: [ ] Abdominal Pain [ ] Diarrhea [ ] Constipation [ ] Hemorrhoids [ ] Nausea [ ] Vomiting  Genitourinary: [ ] Nocturia [ ] Dysuria [ ] Incontinence  Extremities: [ ] Swelling [ ] Joint Pain  Neurologic: [ ] Focal deficit [ ] Paresthesias [ ] Syncope  Lymphatic: [ ] Swelling [ ] Lymphadenopathy   Skin: [ ] Rash [ ] Ecchymoses [ ] Wounds [ ] Lesions  Psychiatry: [ ] Depression [ ] Suicidal/Homicidal Ideation [ ] Anxiety [ ] Sleep Disturbances  [x ] 10 point review of systems is otherwise negative except as mentioned above       [ ]Unable to obtain    All other systems were reviewed and are negative, except as noted.    VITALS/PHYSICAL EXAM  --------------------------------------------------------------------------------  T(C): 36.8 (12-14-17 @ 08:54), Max: 36.8 (12-14-17 @ 00:48)  HR: 88 (12-14-17 @ 08:54) (69 - 120)  BP: 116/74 (12-14-17 @ 08:54) (114/75 - 135/90)  RR: 16 (12-14-17 @ 08:54) (16 - 22)  SpO2: 96% (12-14-17 @ 05:22) (95% - 98%)  Wt(kg): --      Daily     Daily   I&O's Summary          Physical Exam:              Gen: NAD   	HEENT: anicteric  	Pulm: CTA B/L   	CV: RRR  	Back: No dependent edema  	Abd: soft, nontender, nondistended  	: No villarreal  	LE: Warm, no edema  	Neuro: no asterixis  	Skin: Warm, without rashes  	Vascular access: none    LABS/STUDIES  --------------------------------------------------------------------------------                Creatinine Trend:  SCr 0.60 [12-10 @ 18:18]  SCr 0.68 [12-08 @ 13:48]  SCr 0.62 [12-07 @ 07:09]  SCr 0.60 [12-05 @ 11:42]  SCr 0.74 [12-05 @ 10:08]    Urinalysis - [12-11-17 @ 14:16]      Color Colorless / Appearance Clear / SG 1.006 / pH 6.5      Gluc Negative / Ketone Negative  / Bili Negative / Urobili Negative       Blood Moderate / Protein Negative / Leuk Est Large / Nitrite Negative      RBC 2-5 / WBC 10-25 / Hyaline  / Gran  / Sq Epi  / Non Sq Epi  / Bacteria Few    Urine Creatinine 22      [12-11-17 @ 14:16]  Urine Sodium <20      [12-11-17 @ 14:16]  Urine Potassium 9      [12-11-17 @ 14:16]  Urine Chloride <20      [12-11-17 @ 14:16]  Urine Phosphorus 28.7      [12-11-17 @ 15:14]  Urine Osmolality 133      [12-11-17 @ 14:16]    TSH 0.67      [12-11-17 @ 07:42]      Syphilis Screen (Treponema Pallidum Ab) Negative      [11-30-17 @ 17:02]      Radiology  --------------------------------------------------------------------------------    --------------------------------------------------------------------------------  Cole Dill

## 2017-12-14 NOTE — PROGRESS NOTE ADULT - PROBLEM SELECTOR PLAN 1
-Continue hydralazine and procardia  -Appreciate Cardiology and Renal consultation: Renal sono negative and labs negative to date. Will follow up plasma metanephrines.   -Anxiety improved since yesterday. Appreciate Psych follow up. Will no longer given seroquel, xanax prn for anxiety.   -BPs now normotensive  -VS q4h  -Reg diet  -OOB  -Macrobid for UTI  -Routine inpatient care    Todd PGY3.
27 F with intermittent hypertension and anxiety.   Unknown etiology of HTN at this time. Had pre eclampsia during her pregnancy. Never on anti htn meds prior to this. Likely pre eclampsia induced   There is concern for secondary causes of HTN  will f/u w/u.   f/u renin, plasma metanephrines, pth,   tsH, aldosterone wnl. cortisol elevated  Echo and US kidney reviewed.    Patients anti hypertensive regimen modified to procardia 60 in am  hydralazine q12 (can take at noon or evening and then at night) (hold for SBP < 110)  Patient to follow with Dr. Dill/Dr Louis in clinic (Wednesday morning) in 2-3 weeks  Patient should be advise to keep a log book of BP's (with time) for her office visit-measure BP in am, noon, evening and night
Pt is postpartum day 12 with her third readmission for BP control.   S/P nephrology and cardiology eval  Will ask for neuro consult for the complaints regarding visual changes. Her headache might be musculoskeletal  Continue with current BP meds as her BP is in good control-  nifedipine XL 60 mg q day and hydralazine 25 mg q 12
-Continue hydralazine and procardia  -Appreciate Cardiology and Renal consultation: Renal sono negative and labs negative to date. Will follow urine studies  -BPs now normotensive  -VS q4h  -Reg diet  -OOB  -Macrobid for UTI  -Routine inpatient care    Todd PGY3.
-Continue hydralazine and procardia  -Appreciate Cardiology and Renal consultation: Renal sono negative and labs negative to date. Will send plasma metanephrines this AM  -BPs now normotensive  -VS q4h  -Reg diet  -OOB  -Macrobid for UTI  -Routine inpatient care    Todd PGY3.
-Continue hydralazine and procardia  -Appreciate Cardiology and Renal consultation: Will send AM cortisol, HELLP Labs, urine lytes, urine metanephrines. Renal sono.  -BPs now normotensive  -VS q4h  -Reg diet  -OOB  -Routine inpatient care    Todd PGY3
27 F with intermittent hypertension and anxiety.   Unknown etiology of HTN at this time. Had pre eclampsia during her pregnancy. Never on anti htn meds prior to this. Likely pre eclampsia induced   There is concern for secondary causes of HTN  will f/u w/u.   f/u renin, plasma metanephrines, pth,   tsH, aldosterone wnl. cortisol elevated  Echo and US kidney reviewed.    Patients anti hypertensive regimen modified to procardia 60 in am  hydralazine q12 (can take at noon or evening and then at night) (hold for SBP < 110)  Patient to follow with Dr. Dill/Dr Louis in clinic (Wednesday morning) in 2-3 weeks  Patient should be advise to keep a log book of BP's (with time) for her office visit-measure BP in am, noon, evening and night
27 F with intermittent hypertension and anxiety.   Unknown etiology of HTN at this time. Had pre eclampsia during her pregnancy. Never on anti htn meds prior to this. Likely pre eclampsia induced   There is concern for secondary causes of HTN  will f/u w/u. Patient planned for 24 hour metanephrine collection today.   f/u renin/harpal, plasma metanephrines, pth,   tsH wnl. cortisol elevated.   Echo and US kidney reviewed.

## 2017-12-14 NOTE — DISCHARGE NOTE ADULT - HOSPITAL COURSE
Patient admitted 12/10 for severe range BPs after discharge on 12/8.  Patient s/p VAVD on 12/1, was admitted twice for preeclampsia with severe features.  Patient had cardiology and renal consultations to manage BPs.  BP adjusted to hydralazine 25mg Q12h and nifedipine 60mg XL at time of discharge with good control.  Patient had panic attack on HD3 and rapid response was called.  Patient had taken does of seroquel immediately prior to the attack.  Patient was seen by psych and cleared for discharge to continue lexapro 10mg daily and xanax 1mg q6h prn.  Patient stable for discharge on HD4.

## 2017-12-14 NOTE — DISCHARGE NOTE ADULT - CARE PLAN
Principal Discharge DX:	Pre-eclampsia in third trimester  Goal:	Please continue to take medications as above, control BPs.  Instructions for follow-up, activity and diet:	Please call for follow-up with Dr. Vazquez's office in 1 week.  Please she nephrologist in 2 weeks.  Hold BP meds if systolic BP is <110mmHG.  Take blood pressure 4 times per day.  Secondary Diagnosis:	Anxiety  Goal:	Please follow-up with your mental health providers within 2 weeks.  Secondary Diagnosis:	Obsessive-compulsive disorder, unspecified type

## 2017-12-14 NOTE — PROGRESS NOTE BEHAVIORAL HEALTH - NSBHFUPINTERVALCCFT_PSY_A_CORE
"I had another panic attack"
Patient with RRT called last afternoon for possible allergic vs dystonic vs anxiety reaction, please see addendum from yesterday for details She received 50 mg of IV benadryl with resolution of anxiety/physical symptoms.

## 2017-12-14 NOTE — PROGRESS NOTE ADULT - PROBLEM SELECTOR PLAN 2
Pt to try seroquel 25 mg qhs with 12.5 mg q 6 hrs prn
continue Lexapro 10mg daily and Xanax 1mg prn q6h

## 2017-12-14 NOTE — DISCHARGE NOTE ADULT - MEDICATION SUMMARY - MEDICATIONS TO TAKE
I will START or STAY ON the medications listed below when I get home from the hospital:    ibuprofen 600 mg oral tablet  -- 1 tab(s) by mouth every 6 hours  -- Indication: For Pain    acetaminophen 325 mg oral tablet  -- 2 tab(s) by mouth every 6 hours  -- Indication: For Pain    escitalopram 10 mg oral tablet  -- 1 tab(s) by mouth once a day  -- Indication: For Anxiety    ALPRAZolam 1 mg oral tablet  -- 1 tab(s) by mouth every 6 hours, As needed, Anxiety  -- Indication: For Anxiety    NIFEdipine 60 mg oral tablet, extended release  -- 1 tab(s) by mouth once a day  -- Indication: For Pre-eclampsia    Prenatal Multivitamins with Folic Acid 1 mg oral tablet  -- 1 tab(s) by mouth once a day  -- Indication: For Normal postpartum care    hydrALAZINE 25 mg oral tablet  -- 1 tab(s) by mouth every 12 hours  -- Indication: For Pre-eclampsia

## 2017-12-14 NOTE — DISCHARGE NOTE ADULT - MEDICATION SUMMARY - MEDICATIONS TO STOP TAKING
I will STOP taking the medications listed below when I get home from the hospital:    labetalol 300 mg oral tablet  -- 1 tab(s) by mouth every 8 hours    Reglan 10 mg oral tablet  -- 1 tab(s) by mouth every 6 hours, As needed, nausea    nitrofurantoin macrocrystals-monohydrate 100 mg oral capsule  -- 1 cap(s) by mouth 2 times a day (with meals)

## 2017-12-14 NOTE — DISCHARGE NOTE ADULT - INSTRUCTIONS
Follow up with MD as directed call if any increased pain, fever, chills, pain on urination, headache, dizziness, and blurry vision. Continue on medication as directed and follow up with MD as directed.

## 2017-12-14 NOTE — PROGRESS NOTE BEHAVIORAL HEALTH - NSBHATTESTSEENBY_PSY_A_CORE
Trainee with telephonic supervision from Attending Psychiatrist
Attending Psychiatrist supervising NP/Trainee, meeting pt...

## 2017-12-14 NOTE — DISCHARGE NOTE ADULT - PATIENT PORTAL LINK FT
“You can access the Follow[facility] Patient Portal, offered by [facility], by registering with the following website: [email or web address]”

## 2017-12-14 NOTE — PROGRESS NOTE ADULT - ASSESSMENT
26 y/o  female  PPD#1 s/p VAVD on  complicated by postpartum preeclampsia (s/p Mg 12/3-) with readmission on 10/5 for postpartum PEC (s/p Mg -), presents with severe range BPs (200/110), HA, blurry vision, SOB.
27 year old  a/w severe range BPs, s/p VAVD (12/1) 2/2 NRFHT PP course c.b sPEC s/p magnesium x2. Yesterday with RRT for Panic attack after taking seroquel.
27 year old  a/w severe range BPs, s/p VAVD (12/1) 2/2 NRFHT PP course c.b sPEC s/p magnesium x2
28 y/o  female  PPD#1 s/p VAVD on  complicated by postpartum preeclampsia (s/p Mg 12/3-) with readmission on 10/5 for postpartum PEC (s/p Mg -), presents with severe range BPs (200/110), HA, blurry vision, SOB.
28 y/o  female  PPD#1 s/p VAVD on  complicated by postpartum preeclampsia (s/p Mg 12/3-) with readmission on 10/5 for postpartum PEC (s/p Mg -), presents with severe range BPs (200/110), HA, blurry vision, SOB.

## 2017-12-15 LAB — 5OH-INDOLEACETATE UR-MCNC: 1.6 MG/G CREAT — SIGNIFICANT CHANGE UP

## 2017-12-18 LAB
METANEPHRINE, PL: 13 PG/ML — SIGNIFICANT CHANGE UP (ref 0–62)
METANEPHRINE, PL: 19 PG/ML — SIGNIFICANT CHANGE UP (ref 0–62)
NORMETANEPHRINE, PL: 34 PG/ML — SIGNIFICANT CHANGE UP (ref 0–145)
NORMETANEPHRINE, PL: 35 PG/ML — SIGNIFICANT CHANGE UP (ref 0–145)

## 2017-12-19 ENCOUNTER — NON-APPOINTMENT (OUTPATIENT)
Age: 27
End: 2017-12-19

## 2017-12-19 ENCOUNTER — APPOINTMENT (OUTPATIENT)
Dept: CARDIOLOGY | Facility: CLINIC | Age: 27
End: 2017-12-19
Payer: COMMERCIAL

## 2017-12-19 VITALS
DIASTOLIC BLOOD PRESSURE: 92 MMHG | HEART RATE: 108 BPM | HEIGHT: 62 IN | SYSTOLIC BLOOD PRESSURE: 143 MMHG | WEIGHT: 118 LBS | OXYGEN SATURATION: 97 % | BODY MASS INDEX: 21.71 KG/M2

## 2017-12-19 DIAGNOSIS — F41.9 ANXIETY DISORDER, UNSPECIFIED: ICD-10-CM

## 2017-12-19 PROCEDURE — 93000 ELECTROCARDIOGRAM COMPLETE: CPT

## 2017-12-19 PROCEDURE — 99215 OFFICE O/P EST HI 40 MIN: CPT

## 2017-12-19 RX ORDER — ALPRAZOLAM 1 MG/1
1 TABLET ORAL
Qty: 90 | Refills: 0 | Status: ACTIVE | COMMUNITY
Start: 2017-12-19

## 2017-12-20 ENCOUNTER — APPOINTMENT (OUTPATIENT)
Dept: NEPHROLOGY | Facility: CLINIC | Age: 27
End: 2017-12-20
Payer: COMMERCIAL

## 2017-12-20 VITALS
SYSTOLIC BLOOD PRESSURE: 152 MMHG | OXYGEN SATURATION: 98 % | HEART RATE: 109 BPM | HEIGHT: 62 IN | DIASTOLIC BLOOD PRESSURE: 102 MMHG

## 2017-12-20 DIAGNOSIS — I10 ESSENTIAL (PRIMARY) HYPERTENSION: ICD-10-CM

## 2017-12-20 PROCEDURE — 99215 OFFICE O/P EST HI 40 MIN: CPT | Mod: GC

## 2017-12-21 PROBLEM — I10 BENIGN HYPERTENSION: Status: ACTIVE | Noted: 2017-12-21

## 2017-12-22 ENCOUNTER — APPOINTMENT (OUTPATIENT)
Dept: CARDIOLOGY | Facility: CLINIC | Age: 27
End: 2017-12-22

## 2017-12-24 LAB — RENIN PLAS-CCNC: 2.93 NG/ML/HR — SIGNIFICANT CHANGE UP (ref 0.17–5.38)

## 2018-01-11 ENCOUNTER — OTHER (OUTPATIENT)
Age: 28
End: 2018-01-11

## 2018-02-09 ENCOUNTER — MEDICATION RENEWAL (OUTPATIENT)
Age: 28
End: 2018-02-09

## 2018-02-09 RX ORDER — HYDRALAZINE HYDROCHLORIDE 25 MG/1
25 TABLET ORAL DAILY
Refills: 0 | Status: DISCONTINUED | COMMUNITY
Start: 2017-12-19 | End: 2018-02-01

## 2018-03-01 ENCOUNTER — TRANSCRIPTION ENCOUNTER (OUTPATIENT)
Age: 28
End: 2018-03-01

## 2018-06-29 NOTE — CHART NOTE - NSCHARTNOTEFT_GEN_A_CORE
-Activity as tolerated  -No lifting, pushing, or pulling more than 10 pounds for 2 weeks  -Do not drive for 2 weeks or if you are still taking pain medication  -Take pain medication as prescribed; you may use tylenol for general aches and pains. No NSAIDs(ibuprofen,motrin,aleve,naproxen,advil)  -Continue to use your incentive spirometer 10 times every hour while awake for the next 2 weeks  -General diet   Liquid glue called Dermabond is covering your incision. Do not pick off,will come off on its own   -Wash incisions daily with plain soap with warm water, rinse and pat dry. Leave open to air.   -OK to start showering on Sunday    Follow up with Dr. Obrien/Nurse practitioner on Thursday July 19th at 1100 am. Pre clinic chest xray to be completed in [address]. Please arrive 30 minutes early for a chest xray.    Resume Plavix and Aspirin tomorrow      Signs and symptoms of incisional infection:    If an infection were to develop in your incision, it would generally appear within 14 days after your operation.    Observe your incision daily for the following signs and symptoms:  1.)   Redness & Swelling: Increase in redness and swelling along the incision line (some         Redness is to be expected)  2.)   Pain: Pain unrelieved by medication  3.)   Separation: Any place where the incision is  or opening  4.)   Drainage:  Persistent drainage; blood, clear fluid or pus along the incision, or around         Sutures or staples  5.)   Fever: Take your temperature if you have chills, shivering, or feel warm.  Call your           Surgeon if your temperature is above 101 F  6.)  Call your surgeon if you notice any signs of infection, increasing pain, or for anything          Worrisome.    Supplies: Ice Bag, Lotion Remover,Dermabond Instructions, SDS card           S: Patient re-examined after transport to 3rd floor. Patient continued to have severe range BPs 170-180/100s. Patient states that she see some dark spots in her eyes still. Denies HA, blurred vision, CP, SOB. Patient was given procardia 10mg po and repeat was 153/97. Patient was started on magnesium for seizure prophylaxis and labetolol 200mg TID.   O: VS /97  HR 98  Gen: NAD, anxious  CV: RRR, no murmurs  Resp: CTA  Abd: soft, fundus firm  : lochia wnl    A/P: 28 yo  PPD#2 from [facility] now diagnosed with severe preeclampsia. BPs improved.  - standing labetolol 200 TID, magnesium  - q15 min VS  - IVF  - Routine PP care    Seen by Dr. Naik. d/w Dr. Melvin Tran pgy1

## 2018-11-30 NOTE — ED ADULT NURSE NOTE - FALL HARM RISK CONCLUSION
Discussion/Summary   vitamin B1 was normal        Verified Results  VITAMIN B1, WHOLE BLOOD [id number] 09:19AM NATASHA BOOTH   [Sep 15, 2018 1:55PM NATASHA BOOTH]  vitamin B1 was normal     Test Name Result Flag Reference   VITAMIN B1, WHOLE      See Comment  Reference range: 70 to 180  Unit: nmol/L  (NOTE)  INTERPRETIVE INFORMATION: Vitamin B1, Whole Blood  This assay measures the concentration of thiamine diphosphate  (TDP), the primary active form of vitamin B1. Approximately 90  percent of vitamin B1 present in whole blood is TDP. Thiamine and  thiamine monophosphate, which comprise the remaining 10 percent,  are not measured.  Test developed and characteristics determined by [facility]. See Compliance Statement B: [employer]  Performed by [facility],  [address] [place] [address] [phone]  [email or web address], Anil Wagner MD, Lab. Director       Message   see     
Universal Safety Interventions

## 2019-04-26 ENCOUNTER — OUTPATIENT (OUTPATIENT)
Dept: OUTPATIENT SERVICES | Facility: HOSPITAL | Age: 29
LOS: 1 days | Discharge: HOME | End: 2019-04-26

## 2019-04-26 DIAGNOSIS — Z98.82 BREAST IMPLANT STATUS: Chronic | ICD-10-CM

## 2019-04-29 DIAGNOSIS — F32.9 MAJOR DEPRESSIVE DISORDER, SINGLE EPISODE, UNSPECIFIED: ICD-10-CM

## 2019-05-10 ENCOUNTER — TRANSCRIPTION ENCOUNTER (OUTPATIENT)
Age: 29
End: 2019-05-10

## 2019-07-19 NOTE — PROGRESS NOTE ADULT - PROBLEM SELECTOR PROBLEM 1
Pre-eclampsia, delivered
19-Jul-2019 00:00

## 2019-07-23 ENCOUNTER — EMERGENCY (EMERGENCY)
Facility: HOSPITAL | Age: 29
LOS: 0 days | Discharge: HOME | End: 2019-07-23
Attending: EMERGENCY MEDICINE | Admitting: EMERGENCY MEDICINE
Payer: COMMERCIAL

## 2019-07-23 VITALS
RESPIRATION RATE: 17 BRPM | SYSTOLIC BLOOD PRESSURE: 139 MMHG | DIASTOLIC BLOOD PRESSURE: 78 MMHG | TEMPERATURE: 98 F | OXYGEN SATURATION: 100 % | HEART RATE: 78 BPM

## 2019-07-23 VITALS — HEIGHT: 63 IN | WEIGHT: 119.93 LBS

## 2019-07-23 DIAGNOSIS — F41.9 ANXIETY DISORDER, UNSPECIFIED: ICD-10-CM

## 2019-07-23 DIAGNOSIS — N64.4 MASTODYNIA: ICD-10-CM

## 2019-07-23 DIAGNOSIS — D64.9 ANEMIA, UNSPECIFIED: ICD-10-CM

## 2019-07-23 DIAGNOSIS — R06.02 SHORTNESS OF BREATH: ICD-10-CM

## 2019-07-23 DIAGNOSIS — Z98.82 BREAST IMPLANT STATUS: Chronic | ICD-10-CM

## 2019-07-23 LAB
ALBUMIN SERPL ELPH-MCNC: 4.1 G/DL — SIGNIFICANT CHANGE UP (ref 3.3–5)
ALP SERPL-CCNC: 56 U/L — SIGNIFICANT CHANGE UP (ref 40–120)
ALT FLD-CCNC: 25 U/L — SIGNIFICANT CHANGE UP (ref 12–78)
ANION GAP SERPL CALC-SCNC: 7 MMOL/L — SIGNIFICANT CHANGE UP (ref 5–17)
AST SERPL-CCNC: 12 U/L — LOW (ref 15–37)
BASOPHILS # BLD AUTO: 0.02 K/UL — SIGNIFICANT CHANGE UP (ref 0–0.2)
BASOPHILS NFR BLD AUTO: 0.4 % — SIGNIFICANT CHANGE UP (ref 0–2)
BILIRUB SERPL-MCNC: 0.4 MG/DL — SIGNIFICANT CHANGE UP (ref 0.2–1.2)
BUN SERPL-MCNC: 16 MG/DL — SIGNIFICANT CHANGE UP (ref 7–23)
CALCIUM SERPL-MCNC: 8.8 MG/DL — SIGNIFICANT CHANGE UP (ref 8.5–10.1)
CHLORIDE SERPL-SCNC: 106 MMOL/L — SIGNIFICANT CHANGE UP (ref 96–108)
CO2 SERPL-SCNC: 28 MMOL/L — SIGNIFICANT CHANGE UP (ref 22–31)
CREAT SERPL-MCNC: 0.84 MG/DL — SIGNIFICANT CHANGE UP (ref 0.5–1.3)
D DIMER BLD IA.RAPID-MCNC: <150 NG/ML DDU — SIGNIFICANT CHANGE UP
EOSINOPHIL # BLD AUTO: 0.06 K/UL — SIGNIFICANT CHANGE UP (ref 0–0.5)
EOSINOPHIL NFR BLD AUTO: 1.1 % — SIGNIFICANT CHANGE UP (ref 0–6)
GLUCOSE SERPL-MCNC: 85 MG/DL — SIGNIFICANT CHANGE UP (ref 70–99)
HCG SERPL-ACNC: <1 MIU/ML — SIGNIFICANT CHANGE UP
HCT VFR BLD CALC: 38.3 % — SIGNIFICANT CHANGE UP (ref 34.5–45)
HGB BLD-MCNC: 13.1 G/DL — SIGNIFICANT CHANGE UP (ref 11.5–15.5)
IMM GRANULOCYTES NFR BLD AUTO: 0.2 % — SIGNIFICANT CHANGE UP (ref 0–1.5)
LYMPHOCYTES # BLD AUTO: 1.45 K/UL — SIGNIFICANT CHANGE UP (ref 1–3.3)
LYMPHOCYTES # BLD AUTO: 27.2 % — SIGNIFICANT CHANGE UP (ref 13–44)
MCHC RBC-ENTMCNC: 30 PG — SIGNIFICANT CHANGE UP (ref 27–34)
MCHC RBC-ENTMCNC: 34.2 GM/DL — SIGNIFICANT CHANGE UP (ref 32–36)
MCV RBC AUTO: 87.8 FL — SIGNIFICANT CHANGE UP (ref 80–100)
MONOCYTES # BLD AUTO: 0.34 K/UL — SIGNIFICANT CHANGE UP (ref 0–0.9)
MONOCYTES NFR BLD AUTO: 6.4 % — SIGNIFICANT CHANGE UP (ref 2–14)
NEUTROPHILS # BLD AUTO: 3.46 K/UL — SIGNIFICANT CHANGE UP (ref 1.8–7.4)
NEUTROPHILS NFR BLD AUTO: 64.7 % — SIGNIFICANT CHANGE UP (ref 43–77)
PLATELET # BLD AUTO: 187 K/UL — SIGNIFICANT CHANGE UP (ref 150–400)
POTASSIUM SERPL-MCNC: 3.8 MMOL/L — SIGNIFICANT CHANGE UP (ref 3.5–5.3)
POTASSIUM SERPL-SCNC: 3.8 MMOL/L — SIGNIFICANT CHANGE UP (ref 3.5–5.3)
PROT SERPL-MCNC: 7.2 GM/DL — SIGNIFICANT CHANGE UP (ref 6–8.3)
RBC # BLD: 4.36 M/UL — SIGNIFICANT CHANGE UP (ref 3.8–5.2)
RBC # FLD: 12 % — SIGNIFICANT CHANGE UP (ref 10.3–14.5)
SODIUM SERPL-SCNC: 141 MMOL/L — SIGNIFICANT CHANGE UP (ref 135–145)
TROPONIN I SERPL-MCNC: <0.015 NG/ML — SIGNIFICANT CHANGE UP (ref 0.01–0.04)
WBC # BLD: 5.34 K/UL — SIGNIFICANT CHANGE UP (ref 3.8–10.5)
WBC # FLD AUTO: 5.34 K/UL — SIGNIFICANT CHANGE UP (ref 3.8–10.5)

## 2019-07-23 PROCEDURE — 93010 ELECTROCARDIOGRAM REPORT: CPT

## 2019-07-23 PROCEDURE — 99284 EMERGENCY DEPT VISIT MOD MDM: CPT

## 2019-07-23 PROCEDURE — 71046 X-RAY EXAM CHEST 2 VIEWS: CPT | Mod: 26

## 2019-07-23 NOTE — ED STATDOCS - PROGRESS NOTE DETAILS
Patient initially seen and evaluated with ED attending at intake.  Well appearing with a normal breast exam.  Implant in place.  She has tenderness to R upper chest wall superior to breast, possible muscle strain.  Workup here negative.  REcommended following up with her plastic surgeon -Clementine Carranza PA-C

## 2019-07-23 NOTE — ED STATDOCS - CLINICAL SUMMARY MEDICAL DECISION MAKING FREE TEXT BOX
R sided CP x few days. no risk factor for PE. clinically likely MSK, but will check EKG, CXR, labs. no signs of infection of implant. R sided CP x few days. no risk factor for PE. clinically likely MSK, but will check EKG, CXR, labs. no signs of infection of implant.    Workup negative, suspect muscular pain.  She will follow up with her plastic surgeon

## 2019-07-23 NOTE — ED STATDOCS - OBJECTIVE STATEMENT
29 y/o female with PMHx of anxiety on Lexapro, Lamictal, Klonopin, preeclampsia, anemia s/p b/l breast implants 2015 presents to the ED c/o SOB beginning last night with assoc. R pectoral CP radiating to axillary. Aggravated with inspiration. Pt notes 1 mild episode of L nipple discharge, resolved. Pt notes that she is 20 months post-partum. Pt states that she was dead-lifting in gym for first time in several weeks. Nonsmoker. No BCP. Cardio/[facility]. Plastics/Dr. Banda [place].

## 2019-07-23 NOTE — ED STATDOCS - ATTENDING CONTRIBUTION TO CARE
I,Jame Smith MD,  performed the initial face to face bedside interview with this patient regarding history of present illness, review of symptoms and relevant past medical, social and family history.  I completed an independent physical examination.  I was the initial provider who evaluated this patient. I have signed out the follow up of any pending tests (i.e. labs, radiological studies) to the ACP.  I have communicated the patient’s plan of care and disposition with the ACP.  The history, relevant review of systems, past medical and surgical history, medical decision making, and physical examination was documented by the scribe in my presence and I attest to the accuracy of the documentation.

## 2019-07-23 NOTE — ED ADULT TRIAGE NOTE - CHIEF COMPLAINT QUOTE
Patient presents with mom reports shortness of breath and right breast pain. Patient reports she has bilateral breast implants from 2015. Denies oral contraceptives, denies smoking. Reports shortness of breath started last night

## 2019-09-30 ENCOUNTER — EMERGENCY (EMERGENCY)
Facility: HOSPITAL | Age: 29
LOS: 0 days | Discharge: HOME | End: 2019-09-30
Attending: EMERGENCY MEDICINE
Payer: COMMERCIAL

## 2019-09-30 VITALS
SYSTOLIC BLOOD PRESSURE: 123 MMHG | HEART RATE: 74 BPM | OXYGEN SATURATION: 100 % | DIASTOLIC BLOOD PRESSURE: 79 MMHG | RESPIRATION RATE: 16 BRPM

## 2019-09-30 VITALS
SYSTOLIC BLOOD PRESSURE: 146 MMHG | DIASTOLIC BLOOD PRESSURE: 82 MMHG | RESPIRATION RATE: 17 BRPM | OXYGEN SATURATION: 100 % | HEART RATE: 98 BPM | TEMPERATURE: 99 F

## 2019-09-30 DIAGNOSIS — Z98.82 BREAST IMPLANT STATUS: Chronic | ICD-10-CM

## 2019-09-30 DIAGNOSIS — R42 DIZZINESS AND GIDDINESS: ICD-10-CM

## 2019-09-30 DIAGNOSIS — R10.2 PELVIC AND PERINEAL PAIN: ICD-10-CM

## 2019-09-30 DIAGNOSIS — F42.9 OBSESSIVE-COMPULSIVE DISORDER, UNSPECIFIED: ICD-10-CM

## 2019-09-30 DIAGNOSIS — O99.341 OTHER MENTAL DISORDERS COMPLICATING PREGNANCY, FIRST TRIMESTER: ICD-10-CM

## 2019-09-30 DIAGNOSIS — Z3A.01 LESS THAN 8 WEEKS GESTATION OF PREGNANCY: ICD-10-CM

## 2019-09-30 DIAGNOSIS — O26.891 OTHER SPECIFIED PREGNANCY RELATED CONDITIONS, FIRST TRIMESTER: ICD-10-CM

## 2019-09-30 DIAGNOSIS — F41.9 ANXIETY DISORDER, UNSPECIFIED: ICD-10-CM

## 2019-09-30 LAB
ALBUMIN SERPL ELPH-MCNC: 4.7 G/DL — SIGNIFICANT CHANGE UP (ref 3.3–5)
ALP SERPL-CCNC: 52 U/L — SIGNIFICANT CHANGE UP (ref 40–120)
ALT FLD-CCNC: 24 U/L — SIGNIFICANT CHANGE UP (ref 12–78)
ANION GAP SERPL CALC-SCNC: 3 MMOL/L — LOW (ref 5–17)
APPEARANCE UR: CLEAR — SIGNIFICANT CHANGE UP
AST SERPL-CCNC: 13 U/L — LOW (ref 15–37)
BASOPHILS # BLD AUTO: 0.02 K/UL — SIGNIFICANT CHANGE UP (ref 0–0.2)
BASOPHILS NFR BLD AUTO: 0.3 % — SIGNIFICANT CHANGE UP (ref 0–2)
BILIRUB SERPL-MCNC: 0.4 MG/DL — SIGNIFICANT CHANGE UP (ref 0.2–1.2)
BILIRUB UR-MCNC: NEGATIVE — SIGNIFICANT CHANGE UP
BUN SERPL-MCNC: 14 MG/DL — SIGNIFICANT CHANGE UP (ref 7–23)
CALCIUM SERPL-MCNC: 9.1 MG/DL — SIGNIFICANT CHANGE UP (ref 8.5–10.1)
CHLORIDE SERPL-SCNC: 109 MMOL/L — HIGH (ref 96–108)
CO2 SERPL-SCNC: 27 MMOL/L — SIGNIFICANT CHANGE UP (ref 22–31)
COLOR SPEC: YELLOW — SIGNIFICANT CHANGE UP
CREAT SERPL-MCNC: 0.86 MG/DL — SIGNIFICANT CHANGE UP (ref 0.5–1.3)
DIFF PNL FLD: NEGATIVE — SIGNIFICANT CHANGE UP
EOSINOPHIL # BLD AUTO: 0.02 K/UL — SIGNIFICANT CHANGE UP (ref 0–0.5)
EOSINOPHIL NFR BLD AUTO: 0.3 % — SIGNIFICANT CHANGE UP (ref 0–6)
GLUCOSE SERPL-MCNC: 92 MG/DL — SIGNIFICANT CHANGE UP (ref 70–99)
GLUCOSE UR QL: NEGATIVE MG/DL — SIGNIFICANT CHANGE UP
HCG SERPL-ACNC: 4718 MIU/ML — HIGH
HCT VFR BLD CALC: 38.5 % — SIGNIFICANT CHANGE UP (ref 34.5–45)
HGB BLD-MCNC: 13.4 G/DL — SIGNIFICANT CHANGE UP (ref 11.5–15.5)
IMM GRANULOCYTES NFR BLD AUTO: 0.3 % — SIGNIFICANT CHANGE UP (ref 0–1.5)
KETONES UR-MCNC: NEGATIVE — SIGNIFICANT CHANGE UP
LEUKOCYTE ESTERASE UR-ACNC: NEGATIVE — SIGNIFICANT CHANGE UP
LYMPHOCYTES # BLD AUTO: 1.76 K/UL — SIGNIFICANT CHANGE UP (ref 1–3.3)
LYMPHOCYTES # BLD AUTO: 28 % — SIGNIFICANT CHANGE UP (ref 13–44)
MCHC RBC-ENTMCNC: 30.2 PG — SIGNIFICANT CHANGE UP (ref 27–34)
MCHC RBC-ENTMCNC: 34.8 GM/DL — SIGNIFICANT CHANGE UP (ref 32–36)
MCV RBC AUTO: 86.9 FL — SIGNIFICANT CHANGE UP (ref 80–100)
MONOCYTES # BLD AUTO: 0.31 K/UL — SIGNIFICANT CHANGE UP (ref 0–0.9)
MONOCYTES NFR BLD AUTO: 4.9 % — SIGNIFICANT CHANGE UP (ref 2–14)
NEUTROPHILS # BLD AUTO: 4.16 K/UL — SIGNIFICANT CHANGE UP (ref 1.8–7.4)
NEUTROPHILS NFR BLD AUTO: 66.2 % — SIGNIFICANT CHANGE UP (ref 43–77)
NITRITE UR-MCNC: NEGATIVE — SIGNIFICANT CHANGE UP
PH UR: 7 — SIGNIFICANT CHANGE UP (ref 5–8)
PLATELET # BLD AUTO: 206 K/UL — SIGNIFICANT CHANGE UP (ref 150–400)
POTASSIUM SERPL-MCNC: 3.8 MMOL/L — SIGNIFICANT CHANGE UP (ref 3.5–5.3)
POTASSIUM SERPL-SCNC: 3.8 MMOL/L — SIGNIFICANT CHANGE UP (ref 3.5–5.3)
PROT SERPL-MCNC: 7.5 GM/DL — SIGNIFICANT CHANGE UP (ref 6–8.3)
PROT UR-MCNC: NEGATIVE MG/DL — SIGNIFICANT CHANGE UP
RBC # BLD: 4.43 M/UL — SIGNIFICANT CHANGE UP (ref 3.8–5.2)
RBC # FLD: 11.4 % — SIGNIFICANT CHANGE UP (ref 10.3–14.5)
SODIUM SERPL-SCNC: 139 MMOL/L — SIGNIFICANT CHANGE UP (ref 135–145)
SP GR SPEC: 1 — LOW (ref 1.01–1.02)
UROBILINOGEN FLD QL: NEGATIVE MG/DL — SIGNIFICANT CHANGE UP
WBC # BLD: 6.29 K/UL — SIGNIFICANT CHANGE UP (ref 3.8–10.5)
WBC # FLD AUTO: 6.29 K/UL — SIGNIFICANT CHANGE UP (ref 3.8–10.5)

## 2019-09-30 PROCEDURE — 86900 BLOOD TYPING SEROLOGIC ABO: CPT

## 2019-09-30 PROCEDURE — 81003 URINALYSIS AUTO W/O SCOPE: CPT

## 2019-09-30 PROCEDURE — 76801 OB US < 14 WKS SINGLE FETUS: CPT

## 2019-09-30 PROCEDURE — 76801 OB US < 14 WKS SINGLE FETUS: CPT | Mod: 26

## 2019-09-30 PROCEDURE — 80053 COMPREHEN METABOLIC PANEL: CPT

## 2019-09-30 PROCEDURE — 99284 EMERGENCY DEPT VISIT MOD MDM: CPT | Mod: 25

## 2019-09-30 PROCEDURE — 36415 COLL VENOUS BLD VENIPUNCTURE: CPT

## 2019-09-30 PROCEDURE — 76802 OB US < 14 WKS ADDL FETUS: CPT

## 2019-09-30 PROCEDURE — 86850 RBC ANTIBODY SCREEN: CPT

## 2019-09-30 PROCEDURE — 86901 BLOOD TYPING SEROLOGIC RH(D): CPT

## 2019-09-30 PROCEDURE — 87086 URINE CULTURE/COLONY COUNT: CPT

## 2019-09-30 PROCEDURE — 85025 COMPLETE CBC W/AUTO DIFF WBC: CPT

## 2019-09-30 PROCEDURE — 84702 CHORIONIC GONADOTROPIN TEST: CPT

## 2019-09-30 PROCEDURE — 99284 EMERGENCY DEPT VISIT MOD MDM: CPT

## 2019-09-30 NOTE — ED STATDOCS - CLINICAL SUMMARY MEDICAL DECISION MAKING FREE TEXT BOX
Pt with pregnancy of unknown location.  Plan for close OB follow up for repeat HCG.  Strict return precautions given for pain, bleeding.

## 2019-09-30 NOTE — ED ADULT TRIAGE NOTE - CHIEF COMPLAINT QUOTE
Patient presetns to rule out ectopic pregnancy. Patient states she went to clinic and she has elevated HCG and progesterone levels but sono does not show fetal development. Patient reports feeling nausea and vomiting starting today

## 2019-09-30 NOTE — ED STATDOCS - PROGRESS NOTE DETAILS
30 y/o Female with LMP 8/20/2019 (approx 5 weeks 6 days) presents to ED c/o needing to R/O Ectopic pregnancy.  BHCg was 1030's on 9/25.  no IUP was seen at that time.  Pt denies vaginal bleeding.  Reports pains around neck and little lightheaded with some abdominal pain.  Will F/U Labs and Sono.  Raina Ruiz PA-C Re-eval:   Abd:  Flat, Active Bs x4, Soft, NT/ND. HCG level 4,718.  Pelvic sono with ? gestational sac in uterus.  Neg fetal pole or yolk sac visualized.  Neg adnexa masses bilat.  Recommend close OB F/U with repeat hcG in 2 days.  Repeat sono in 5-7 days.  Raina Ruiz PA-C

## 2019-09-30 NOTE — ED ADULT NURSE NOTE - OBJECTIVE STATEMENT
Pt comes in with complaints of N/V. PT went to clinic and they found elevated HCG. Pt denies chest pain, sob.

## 2019-09-30 NOTE — ED STATDOCS - CARE PROVIDER_API CALL
Lc Cruz)  Obstetrics and Gynecology  [address] [place] [address], [place] [address]  Phone: [phone]  Fax: [phone]  Follow Up Time:

## 2019-09-30 NOTE — ED STATDOCS - PATIENT PORTAL LINK FT
You can access the FollowMyHealth Patient Portal offered by [facility] by registering at the following website: [email or web address]. By joining [employer]’s FollowMyHealth portal, you will also be able to view your health information using other applications (apps) compatible with our system.

## 2019-09-30 NOTE — ED STATDOCS - OBJECTIVE STATEMENT
30 y/o female with a PMHx of postpartum eclampsia and HELLP syndrome presents to the ED sent in by OB/GYN- Dr. Burnette for possible ectopic. . Pt states that LNMP was 19, had transvaginal ultrasound that did not show IUP, but progesterone and HCG are elevated. Past 4-5 days had neck pain, today developed shoulder pain. +lightheadedness and nausea today when standing up. +mild pelvic pain. Denies vaginal bleeding.

## 2019-10-01 PROBLEM — F41.9 ANXIETY DISORDER, UNSPECIFIED: Chronic | Status: ACTIVE | Noted: 2019-07-23

## 2019-10-01 PROBLEM — D64.9 ANEMIA, UNSPECIFIED: Chronic | Status: ACTIVE | Noted: 2019-07-23

## 2019-10-01 LAB
CULTURE RESULTS: SIGNIFICANT CHANGE UP
SPECIMEN SOURCE: SIGNIFICANT CHANGE UP

## 2019-11-30 ENCOUNTER — EMERGENCY (EMERGENCY)
Facility: HOSPITAL | Age: 29
LOS: 0 days | Discharge: HOME | End: 2019-11-30
Attending: HOSPITALIST
Payer: COMMERCIAL

## 2019-11-30 ENCOUNTER — TRANSCRIPTION ENCOUNTER (OUTPATIENT)
Age: 29
End: 2019-11-30

## 2019-11-30 VITALS
SYSTOLIC BLOOD PRESSURE: 123 MMHG | HEART RATE: 83 BPM | OXYGEN SATURATION: 100 % | DIASTOLIC BLOOD PRESSURE: 79 MMHG | RESPIRATION RATE: 17 BRPM | TEMPERATURE: 98 F

## 2019-11-30 VITALS — WEIGHT: 100.09 LBS
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
## 2019-11-30 DIAGNOSIS — S09.90XA UNSPECIFIED INJURY OF HEAD, INITIAL ENCOUNTER: ICD-10-CM

## 2019-11-30 DIAGNOSIS — S02.2XXA FRACTURE OF NASAL BONES, INITIAL ENCOUNTER FOR CLOSED FRACTURE: ICD-10-CM

## 2019-11-30 DIAGNOSIS — F42.9 OBSESSIVE-COMPULSIVE DISORDER, UNSPECIFIED: ICD-10-CM

## 2019-11-30 DIAGNOSIS — F41.9 ANXIETY DISORDER, UNSPECIFIED: ICD-10-CM

## 2019-11-30 DIAGNOSIS — S01.512A LACERATION WITHOUT FOREIGN BODY OF ORAL CAVITY, INITIAL ENCOUNTER: ICD-10-CM

## 2019-11-30 DIAGNOSIS — Z23 ENCOUNTER FOR IMMUNIZATION: ICD-10-CM

## 2019-11-30 DIAGNOSIS — Z98.82 BREAST IMPLANT STATUS: Chronic | ICD-10-CM

## 2019-11-30 PROCEDURE — 76376 3D RENDER W/INTRP POSTPROCES: CPT | Mod: 26

## 2019-11-30 PROCEDURE — 90715 TDAP VACCINE 7 YRS/> IM: CPT

## 2019-11-30 PROCEDURE — 70450 CT HEAD/BRAIN W/O DYE: CPT | Mod: 26

## 2019-11-30 PROCEDURE — 70486 CT MAXILLOFACIAL W/O DYE: CPT

## 2019-11-30 PROCEDURE — 99282 EMERGENCY DEPT VISIT SF MDM: CPT

## 2019-11-30 PROCEDURE — 99284 EMERGENCY DEPT VISIT MOD MDM: CPT | Mod: 25

## 2019-11-30 PROCEDURE — 90471 IMMUNIZATION ADMIN: CPT

## 2019-11-30 PROCEDURE — 70486 CT MAXILLOFACIAL W/O DYE: CPT | Mod: 26

## 2019-11-30 PROCEDURE — 76376 3D RENDER W/INTRP POSTPROCES: CPT

## 2019-11-30 PROCEDURE — 70450 CT HEAD/BRAIN W/O DYE: CPT

## 2019-11-30 RX ORDER — TETANUS TOXOID, REDUCED DIPHTHERIA TOXOID AND ACELLULAR PERTUSSIS VACCINE, ADSORBED 5; 2.5; 8; 8; 2.5 [IU]/.5ML; [IU]/.5ML; UG/.5ML; UG/.5ML; UG/.5ML
0.5 SUSPENSION INTRAMUSCULAR ONCE
Refills: 0 | Status: COMPLETED | OUTPATIENT
Start: 2019-11-30 | End: 2019-11-30

## 2019-11-30 RX ADMIN — TETANUS TOXOID, REDUCED DIPHTHERIA TOXOID AND ACELLULAR PERTUSSIS VACCINE, ADSORBED 0.5 MILLILITER(S): 5; 2.5; 8; 8; 2.5 SUSPENSION INTRAMUSCULAR at 19:16

## 2019-11-30 NOTE — CONSULT NOTE ADULT - ASSESSMENT
Fractured septum, no septal hematoma. Recommended Afrin, saline spray for comfort.   Will evaluate later this week in the office following some resolution of edema. Some of this is likely baseline. Will likely need reduction of deviated septal fracture, outpatient.   -follow up next week in the office.

## 2019-11-30 NOTE — ED STATDOCS - CARE PLAN
Principal Discharge DX:	Head injury  Secondary Diagnosis:	Laceration of mouth  Secondary Diagnosis:	Nasal bone fracture
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
## 2019-11-30 NOTE — ED ADULT NURSE NOTE - PAIN: PRESENCE, MLM
Please let him know that I appreciate his continuing to work on lowering his blood pressure.  It sounds like the magic number is 0.3mg but that his blood pressure drops too low then.  Have him try taking the 0.3 just at night and when his blood pressure is over 140/90 or higher otherwise take the 0.2mg.    So check his blood pressure first before taking it for awhile and see how that is working.  Maybe we can fine tune better.    Let us know again in a week or two.    THANKS AGAIN!   complains of pain/discomfort
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
## 2019-11-30 NOTE — ED STATDOCS - CARE PROVIDER_API CALL
Kevin Hopson)  Surgery  [address] [place] [address]  Phone: [phone]  Fax: [phone]  Follow Up Time:

## 2019-11-30 NOTE — ED STATDOCS - NS_ ATTENDINGSCRIBEDETAILS _ED_A_ED_FT
Lizzie Dewey MD: The history, relevant review of systems, past medical and surgical history, medical decision making, and physical examination was documented by the scribe in my presence and I attest to the accuracy of the documentation.

## 2019-11-30 NOTE — ED STATDOCS - PATIENT PORTAL LINK FT
You can access the FollowMyHealth Patient Portal offered by [facility] by registering at the following website: [email or web address]. By joining [employer]’s FollowMyHealth portal, you will also be able to view your health information using other applications (apps) compatible with our system.

## 2019-11-30 NOTE — ED STATDOCS - ENMT, MLM
Nasal mucosa clear.  Mouth with normal mucosa  Throat has no vesicles, no oropharyngeal exudates and uvula is midline. + left nasal septal hematoma, right mucosal tear inner upper lip at the border of the gum line
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
## 2019-11-30 NOTE — ED STATDOCS - NSFOLLOWUPINSTRUCTIONS_ED_ALL_ED_FT
Pt instructed return to ED if you have a stiff neck, fluid or blood leaking from the nose or ears, any change in awareness, a hard time waking up, or have become more sleepy, a headache that is getting worse, lasts a long time, or is not relieved by acetaminophen (Tylenol), fever, vomiting more than 3 times, problems walking or talking, changes in speech (slurred, difficult to understand, does not make sense), problems thinking straight, seizures (jerking arms or legs without control), changes in behavior or unusual behavior, double vision, changes in eating patterns; Concussion mangement specialist [phone] Pt instructed return to ED if you have a stiff neck, fluid or blood leaking from the nose or ears, any change in awareness, a hard time waking up, or have become more sleepy, a headache that is getting worse, lasts a long time, or is not relieved by acetaminophen (Tylenol), fever, vomiting more than 3 times, problems walking or talking, changes in speech (slurred, difficult to understand, does not make sense), problems thinking straight, seizures (jerking arms or legs without control), changes in behavior or unusual behavior, double vision, changes in eating patterns; Concussion mangement specialist [phone]    Nasal Fracture  A nasal fracture is a break or crack in the bones of the nose or the tissue that helps to form the nose (cartilage). Minor breaks do not require treatment and usually heal on their own after about one month. Serious breaks may require treatment that could include surgery.  What are the causes?  A nasal fracture is usually caused by the strong force of a direct hit to the nose (blunt injury). This type of injury often occurs from:  Playing a contact sport.Being involved in a car accident.Falling.Getting punched.What are the signs or symptoms?  Symptoms of this condition include:  Pain.Swelling of the nose.Bleeding from the nose.Bruising around the nose or bruising around the eyes (black eyes).Crooked appearance of the nose.How is this diagnosed?  This condition may be diagnosed based on a physical exam. During the exam, the health care provider will:  Gently feel the nose for signs of broken bones.Look inside the nostrils to check if there is a blood-filled swelling on the dividing wall between the nostrils (septal hematoma).An X-ray of the nose may be taken. Sometimes, an X-ray may not show a nasal fracture even when one is present. In some cases, X-rays or a CT scan may be taken again 1–5 days later after the swelling has gone down.  How is this treated?  Treatment for this condition depends on the severity of the injury.  Minor fractures that have not caused deformity often do not require treatment.For more serious fractures that have caused bones to move out of position, treatment may involve one of the following:  Repositioning the bones without surgery. The health care provider may be able to do this in his or her office after you are given medicine to numb the nasal area (local anesthetic).Surgery. If surgery is needed, it will be done after the swelling is gone. Surgery will stabilize and align the fracture.Follow these instructions at home:            Activity     Return to your normal activities as told by your health care provider. Ask your health care provider what activities are safe for you.Avoid contact sports for 3–4 weeks or as told by your health care provider.General instructions     If directed, put ice on the injured area:  Put ice in a plastic bag.Place a towel between your skin and the bag.Leave the ice on for 20 minutes, 2–3 times a day.Take over-the-counter and prescription medicines only as told by your health care provider.If your nose starts to bleed, sit in an upright position while you squeeze the soft parts of your nose against the dividing wall between your nostrils (septum) for 10 minutes.Try to avoid blowing your nose.Keep all follow-up visits as told by your health care provider. This is important.Contact a health care provider if:  Your pain increases or becomes severe.You continue to have nosebleeds.The shape of your nose does not return to normal within 5 days.You have pus draining out of your nose.Get help right away if:  You have bleeding from your nose that does not stop after you pinch your nostrils closed for 20 minutes and keep ice on your nose.You have clear fluid draining out of your nose.You notice swelling near the septum inside the nose. This swelling is a septal hematoma that must be drained to help prevent infection.You have difficulty moving your eyes.You have repeated vomiting.Summary  A nasal fracture is a break or crack in the bones or cartilage of the nose.The fracture is usually caused by a blunt injury to the nose.Symptoms include pain, swelling, and facial bruising.Nasal fractures may heal on their own, or your health care provider may need to move the bones back into proper position. In some cases, surgery may be needed.This information is not intended to replace advice given to you by your health care provider. Make sure you discuss any questions you have with your health care provider.    Document Released: 12/15/2001 Document Revised: 05/21/2019 Document Reviewed: 05/21/2019  [employer] Interactive Patient Education © 2019 [employer]

## 2019-12-10 ENCOUNTER — INPATIENT (INPATIENT)
Facility: HOSPITAL | Age: 29
LOS: 0 days | Discharge: HOME | DRG: 156 | End: 2019-12-10
Attending: PLASTIC SURGERY | Admitting: PLASTIC SURGERY
Payer: COMMERCIAL

## 2019-12-10 VITALS
TEMPERATURE: 99 F | RESPIRATION RATE: 18 BRPM | HEIGHT: 61.42 IN | SYSTOLIC BLOOD PRESSURE: 120 MMHG | DIASTOLIC BLOOD PRESSURE: 76 MMHG | OXYGEN SATURATION: 97 % | HEART RATE: 65 BPM | WEIGHT: 98.77 LBS

## 2019-12-10 VITALS — WEIGHT: 100.09 LBS

## 2019-12-10 DIAGNOSIS — S02.2XXA FRACTURE OF NASAL BONES, INITIAL ENCOUNTER FOR CLOSED FRACTURE: ICD-10-CM

## 2019-12-10 DIAGNOSIS — S02.2XXS FRACTURE OF NASAL BONES, SEQUELA: ICD-10-CM

## 2019-12-10 DIAGNOSIS — Z98.82 BREAST IMPLANT STATUS: Chronic | ICD-10-CM

## 2019-12-10 LAB — HCG UR QL: NEGATIVE — SIGNIFICANT CHANGE UP

## 2019-12-10 PROCEDURE — 36415 COLL VENOUS BLD VENIPUNCTURE: CPT

## 2019-12-10 PROCEDURE — 86901 BLOOD TYPING SEROLOGIC RH(D): CPT

## 2019-12-10 PROCEDURE — 86850 RBC ANTIBODY SCREEN: CPT

## 2019-12-10 PROCEDURE — 86900 BLOOD TYPING SEROLOGIC ABO: CPT

## 2019-12-10 PROCEDURE — 81025 URINE PREGNANCY TEST: CPT

## 2019-12-10 RX ORDER — SODIUM CHLORIDE 9 MG/ML
1000 INJECTION, SOLUTION INTRAVENOUS
Refills: 0 | Status: DISCONTINUED | OUTPATIENT
Start: 2019-12-10 | End: 2019-12-10

## 2019-12-10 RX ADMIN — SODIUM CHLORIDE 125 MILLILITER(S): 9 INJECTION, SOLUTION INTRAVENOUS at 17:27

## 2019-12-10 RX ADMIN — SODIUM CHLORIDE 125 MILLILITER(S): 9 INJECTION, SOLUTION INTRAVENOUS at 08:42

## 2019-12-10 RX ADMIN — SODIUM CHLORIDE 125 MILLILITER(S): 9 INJECTION, SOLUTION INTRAVENOUS at 19:45

## 2019-12-10 NOTE — ED PROVIDER NOTE - NS ED ROS FT
Constitutional: No fever or chills  Eyes: No visual changes  HEENT: No throat pain + difficulty breathing through right nares.   CV: No chest pain  Resp: No SOB no cough  GI: No abd pain, nausea or vomiting  : No dysuria  MSK: No musculoskeletal pain  Skin: No rash  Neuro: No headache

## 2019-12-10 NOTE — ED PROVIDER NOTE - PHYSICAL EXAMINATION
Constitutional: NAD AAOx3  Eyes: PERRLA EOMI  Head: Normocephalic atraumatic  ENT: right nares with deviated septum no nasal septal hematoma  no ttp facial bones or c/spine  Mouth: MMM  Cardiac: regular rate   Resp: Lungs CTAB  GI: Abd s/nt/nd  Neuro: CN2-12 intact  Skin: No rashes

## 2019-12-10 NOTE — H&P ADULT - HISTORY OF PRESENT ILLNESS
Patient fell down stairs 1 week ago, went to  ED, had CT maxillofacial which showed a septal fracture, patient was told it would need to be fixed outpatient, now presents to  due to difficulty breathing through right nostril. Patient will undergo closed reduction septal fracture.

## 2019-12-10 NOTE — ED ADULT TRIAGE NOTE - CHIEF COMPLAINT QUOTE
Pt was evaluated recently after fall down stairs with negative ct scans, but was told that swelling may have affected the scans. Pt reports continued nasal congestion and pain.

## 2019-12-10 NOTE — ED PROVIDER NOTE - PROGRESS NOTE DETAILS
jovanny Wallis. Kt Rivera M.D., Attending Physician patient seen by Dr. Wallis - wants to add patient on to OR schedule today - pt TBA - Kt Rivera M.D., Attending Physician

## 2019-12-10 NOTE — ASU DISCHARGE PLAN (ADULT/PEDIATRIC) - NURSING INSTRUCTIONS
Procedure was not done. Family spoke with the surgeon procedure to be done tomorrow at 1100. The PIV D/C pt. VSS afebrile. No pain. No other issues noted.

## 2019-12-10 NOTE — H&P ADULT - NSHPPHYSICALEXAM_GEN_ALL_CORE
General - A&Ox3, in NAD  Cardiac - RRR  Pulmonary - CTA, bilaterally   Abdomen - soft, NT, ND  ENNT - severely deviated septum.

## 2019-12-10 NOTE — ED PROVIDER NOTE - OBJECTIVE STATEMENT
29F presents to the ED with difficulty breathing through nose. pt feel down 1 week ago - seen in ED had ct max face nasal fx - plastics consulted seen by Dr. Wallis and told pt they likely would need to have deviated septum fixed outpatient - now pt returns for difficulty breathing through right nares. No pain or fevers. no nausea vomiting or headache. tried affrin with minimal help. Pt requests that we call Dr. Wallis.

## 2019-12-10 NOTE — ASU DISCHARGE PLAN (ADULT/PEDIATRIC) - ASU DC SPECIAL INSTRUCTIONSFT
You should follow-up in the office in 1 week, please call for follow-up appointment if you have not already made one. [phone].   Prescription pain medications have been prescribed for you, take as needed for pain only. Do not drive a vehicle or operate machinery while taking narcotic pain medication.   Ambulating is very important post operatively, make sure you ambulate several times daily.   If you experience bleeding that does not stop, swelling, hardness of surgical site, chest pain, shortness of breath, leg pain, dizziness or any signs or symptoms of infection such as fever, redness, pus, foul smell of surgical site please contact the office immediately, [phone].

## 2019-12-10 NOTE — H&P ADULT - PROBLEM SELECTOR PLAN 1
Closed reduction of septal fracture   Pain control   OOB ambulating   Regular diet   IS   Discharge home today, follow-up in the office in 1 week

## 2019-12-10 NOTE — ASU DISCHARGE PLAN (ADULT/PEDIATRIC) - CALL YOUR DOCTOR IF YOU HAVE ANY OF THE FOLLOWING:
Fever greater than (need to indicate Fahrenheit or Celsius)/Pain not relieved by Medications/Wound/Surgical Site with redness, or foul smelling discharge or pus/Swelling that gets worse/Unable to urinate/Bleeding that does not stop/Nausea and vomiting that does not stop

## 2019-12-10 NOTE — PROGRESS NOTE ADULT - ASSESSMENT
Patient has been NPO since entry into the ER and been waiting for OR case throughout the day.   She has a 2-year old at home and can not wait till tomorrow as the OR unable to accomodate a time this evening.   She is considering going home tonight.

## 2019-12-10 NOTE — ED PROVIDER NOTE - CLINICAL SUMMARY MEDICAL DECISION MAKING FREE TEXT BOX
29F presents to the ED with difficulty breathing through nose. pt feel down 1 week ago - seen in ED had ct max face nasal fx - plastics consulted seen by Dr. Wallis and told pt they likely would need to have deviated septum fixed outpatient - now pt returns for difficulty breathing through right nares. No pain or fevers. no nausea vomiting or headache. tried affrin with minimal help. Pt requests that we call Dr. Wallis. exam with right nares with deviated septum no nasal septal hematoma.counseled pt and family on symptomatic treatment and likely need for fixation of deviated septum outpatient with plastics. Kt Rivera M.D., Attending Physician

## 2019-12-10 NOTE — ASU DISCHARGE PLAN (ADULT/PEDIATRIC) - CARE PROVIDER_API CALL
Kevin Hopson)  Surgery  [address] [place] [address]  Phone: [phone]  Fax: [phone]  Follow Up Time:

## 2019-12-11 ENCOUNTER — EMERGENCY (EMERGENCY)
Facility: HOSPITAL | Age: 29
LOS: 0 days | Discharge: HOME | End: 2019-12-11
Attending: EMERGENCY MEDICINE | Admitting: PLASTIC SURGERY
Payer: COMMERCIAL

## 2019-12-11 VITALS — HEIGHT: 61.42 IN | WEIGHT: 97 LBS

## 2019-12-11 VITALS
TEMPERATURE: 98 F | RESPIRATION RATE: 18 BRPM | SYSTOLIC BLOOD PRESSURE: 118 MMHG | DIASTOLIC BLOOD PRESSURE: 76 MMHG | HEART RATE: 64 BPM | OXYGEN SATURATION: 100 %
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
## 2019-12-11 DIAGNOSIS — R06.89 OTHER ABNORMALITIES OF BREATHING: ICD-10-CM

## 2019-12-11 DIAGNOSIS — Z98.82 BREAST IMPLANT STATUS: Chronic | ICD-10-CM

## 2019-12-11 DIAGNOSIS — S02.2XXG FRACTURE OF NASAL BONES, SUBSEQUENT ENCOUNTER FOR FRACTURE WITH DELAYED HEALING: ICD-10-CM

## 2019-12-11 DIAGNOSIS — D64.9 ANEMIA, UNSPECIFIED: ICD-10-CM

## 2019-12-11 DIAGNOSIS — F42.9 OBSESSIVE-COMPULSIVE DISORDER, UNSPECIFIED: ICD-10-CM

## 2019-12-11 DIAGNOSIS — F41.9 ANXIETY DISORDER, UNSPECIFIED: ICD-10-CM

## 2019-12-11 LAB — HCG SERPL-ACNC: <1 MIU/ML — SIGNIFICANT CHANGE UP

## 2019-12-11 PROCEDURE — 84702 CHORIONIC GONADOTROPIN TEST: CPT

## 2019-12-11 PROCEDURE — 36415 COLL VENOUS BLD VENIPUNCTURE: CPT

## 2019-12-11 PROCEDURE — 99283 EMERGENCY DEPT VISIT LOW MDM: CPT

## 2019-12-11 RX ORDER — SODIUM CHLORIDE 9 MG/ML
1000 INJECTION INTRAMUSCULAR; INTRAVENOUS; SUBCUTANEOUS ONCE
Refills: 0 | Status: COMPLETED | OUTPATIENT
Start: 2019-12-11 | End: 2019-12-11

## 2019-12-11 RX ORDER — SODIUM CHLORIDE 9 MG/ML
3 INJECTION INTRAMUSCULAR; INTRAVENOUS; SUBCUTANEOUS ONCE
Refills: 0 | Status: COMPLETED | OUTPATIENT
Start: 2019-12-11 | End: 2019-12-11

## 2019-12-11 RX ADMIN — SODIUM CHLORIDE 3 MILLILITER(S): 9 INJECTION INTRAMUSCULAR; INTRAVENOUS; SUBCUTANEOUS at 13:07

## 2019-12-11 RX ADMIN — SODIUM CHLORIDE 1000 MILLILITER(S): 9 INJECTION INTRAMUSCULAR; INTRAVENOUS; SUBCUTANEOUS at 13:07

## 2019-12-11 NOTE — ED STATDOCS - CARE PROVIDER_API CALL
Kevin Hopson)  Surgery  [address] [place] [address]  Phone: [phone]  Fax: [phone]  Follow Up Time:

## 2019-12-11 NOTE — ED STATDOCS - PROGRESS NOTE DETAILS
30 y/o F with PMH of anemia, OCD presents for surgery. Pt had septal fracture as a result of a fall on 11/30. initially came to ED for procedure yesterday, but OR was unavailable until late in the day. Patient preferred to leave due to having toddler at home. Here today to meet Dr. Hopson for procedure. No complications overnight. Last meal 11pm last night. PE; Well appearing. Cardiac: s1s2, RRR. Lungs; CTAB. Abdomen: NBS x4, soft, nontender. HEENT: TTP over right nares. Oropharynx clear. A/P; Septal fracture. Plan for admission. - Pilo Garcia PA-C D/w Dr Hopson's office, recommended admission at this time and OR later today. - FRANDY PerezC D/w Dr Hopson's office, recommended admission at this time and OR later today as add-on. - FRANDY PerezC Patient admitted as per Dr. Hopson. patient concerned about having to wait in that her case is an add-on. Will page Dr. Hopson to discuss. - Pilo Garcia PA-C As per Dr. Hopson, patient's procedure will not be covered by insurance if performed following ED admission. patient to be discharged, will have procedure tomorrow as outpatient. Family made aware by Dr. Hopson prior to my discussion. Will d/c at this time. - FRANDY PerezC

## 2019-12-11 NOTE — ED ADULT NURSE REASSESSMENT NOTE - NS ED NURSE REASSESS COMMENT FT1
pt resting in stretcher comfortably. pt provided with additional warm blankets. MD/PA aware pt family requesting an update in regards to when OR will take pt to surgery.

## 2019-12-11 NOTE — ED ADULT NURSE NOTE - OBJECTIVE STATEMENT
pt a/ox3, ambulatory with steady gait from triage, sent by MD to have surgery on broken nose today. pt seen in ED last night. pt reports hx of deviated septum. pt hx of anxiety, OCD, anemia, eclampsia. pt reports last PO intake 10pm last night. pt reports unable to breathe out of Right nostril.

## 2019-12-11 NOTE — ED STATDOCS - PATIENT PORTAL LINK FT
You can access the FollowMyHealth Patient Portal offered by [facility] by registering at the following website: [email or web address]. By joining [employer]’s FollowMyHealth portal, you will also be able to view your health information using other applications (apps) compatible with our system.

## 2019-12-11 NOTE — ED STATDOCS - ENMT, MLM
Nasal mucosa clear. +mild deviation to the R, no swelling  Mouth with normal mucosa  Throat has no vesicles, no oropharyngeal exudates and uvula is midline.

## 2019-12-11 NOTE — ED ADULT NURSE NOTE - NSIMPLEMENTINTERV_GEN_ALL_ED
Implemented All Universal Safety Interventions:  [place] to call system. Call bell, personal items and telephone within reach. Instruct patient to call for assistance. Room bathroom lighting operational. Non-slip footwear when patient is off stretcher. Physically safe environment: no spills, clutter or unnecessary equipment. Stretcher in lowest position, wheels locked, appropriate side rails in place.

## 2019-12-11 NOTE — ED STATDOCS - OBJECTIVE STATEMENT
30 y/o F with a PMHx of anemia, OCD presents to the ED sent by MD to have surgery on broken nose today. Pt did not eat today. Pt was seen in [facility] yesterday after falling down the stairs and was diagnosed with a septal nasal fracture. MD Wallis sent pt to ED today for surgical repair. No other complaints at this time. 30 y/o F with a PMHx of anemia, OCD presents to the ED sent by MD to have surgery on broken nose today. Pt did not eat today. Injury happened on 11/30. Pt then saw MD outpt and tried to schedule surgery, but came to ED yesterday stating that she was unable to breathe through her R nostril. No other complaints at this time.
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
## 2019-12-12 DIAGNOSIS — S02.2XXA FRACTURE OF NASAL BONES, INITIAL ENCOUNTER FOR CLOSED FRACTURE: ICD-10-CM

## 2019-12-12 DIAGNOSIS — Z88.8 ALLERGY STATUS TO OTHER DRUGS, MEDICAMENTS AND BIOLOGICAL SUBSTANCES: ICD-10-CM

## 2020-04-17 ENCOUNTER — EMERGENCY (EMERGENCY)
Facility: HOSPITAL | Age: 30
LOS: 0 days | Discharge: HOME | End: 2020-04-17
Payer: COMMERCIAL

## 2020-04-17 VITALS
OXYGEN SATURATION: 100 % | DIASTOLIC BLOOD PRESSURE: 91 MMHG | RESPIRATION RATE: 18 BRPM | TEMPERATURE: 98 F | HEART RATE: 83 BPM | SYSTOLIC BLOOD PRESSURE: 135 MMHG

## 2020-04-17 DIAGNOSIS — R05 COUGH: ICD-10-CM

## 2020-04-17 DIAGNOSIS — Z20.828 CONTACT WITH AND (SUSPECTED) EXPOSURE TO OTHER VIRAL COMMUNICABLE DISEASES: ICD-10-CM

## 2020-04-17 DIAGNOSIS — R06.02 SHORTNESS OF BREATH: ICD-10-CM

## 2020-04-17 DIAGNOSIS — R43.9 UNSPECIFIED DISTURBANCES OF SMELL AND TASTE: ICD-10-CM

## 2020-04-17 DIAGNOSIS — M54.9 DORSALGIA, UNSPECIFIED: ICD-10-CM

## 2020-04-17 DIAGNOSIS — Z98.82 BREAST IMPLANT STATUS: Chronic | ICD-10-CM

## 2020-04-17 LAB — SARS-COV-2 RNA SPEC QL NAA+PROBE: SIGNIFICANT CHANGE UP

## 2020-04-17 PROCEDURE — 99283 EMERGENCY DEPT VISIT LOW MDM: CPT

## 2020-04-17 PROCEDURE — 87635 SARS-COV-2 COVID-19 AMP PRB: CPT

## 2020-04-17 NOTE — ED STATDOCS - OBJECTIVE STATEMENT
28 yo female with a PMH of htn presents with cough, sob, upper back pain and loss of smel and taste x 2-3 days. +sick contacts with sister who tested +. Pt is here for testing because worried about passing it to her son.

## 2020-04-17 NOTE — ED STATDOCS - CARE PLAN
Principal Discharge DX:	Cough  Secondary Diagnosis:	Loss of smell  Secondary Diagnosis:	Loss of taste

## 2020-04-17 NOTE — ED STATDOCS - PATIENT PORTAL LINK FT
You can access the FollowMyHealth Patient Portal offered by [facility] by registering at the following website: [email or web address]. By joining [employer]’s FollowMyHealth portal, you will also be able to view your health information using other applications (apps) compatible with our system.

## 2020-04-17 NOTE — ED ADULT NURSE NOTE - OBJECTIVE STATEMENT
PATIENT WAS TREATED, EVALUATED AND DISCHARGED BY INTAKE PROVIDER. PLEASE SEE PROVIDER NOTE FOR ASSESSMENT.  DISCHARGE INSTRUCTIONS REVIEWED WITH PATIENT VERBALLY, PT VERBALIZED UNDERSTANDING OF DISCHARGE INSTRUCTIONS. PAPER COPY OF DISCHARGE INSTRUCTIONS GIVEN TO PATIENT WITH SELF GALINA AND COVID 19 INFORMATION.  Patient has given verbal consent to call/text them with results.

## 2020-06-10 ENCOUNTER — EMERGENCY (EMERGENCY)
Facility: HOSPITAL | Age: 30
LOS: 0 days | Discharge: HOME | End: 2020-06-10
Payer: COMMERCIAL

## 2020-06-10 VITALS
DIASTOLIC BLOOD PRESSURE: 79 MMHG | RESPIRATION RATE: 18 BRPM | OXYGEN SATURATION: 100 % | TEMPERATURE: 99 F | SYSTOLIC BLOOD PRESSURE: 124 MMHG | HEART RATE: 64 BPM

## 2020-06-10 DIAGNOSIS — R51 HEADACHE: ICD-10-CM

## 2020-06-10 DIAGNOSIS — R43.9 UNSPECIFIED DISTURBANCES OF SMELL AND TASTE: ICD-10-CM

## 2020-06-10 DIAGNOSIS — F42.9 OBSESSIVE-COMPULSIVE DISORDER, UNSPECIFIED: ICD-10-CM

## 2020-06-10 DIAGNOSIS — R43.0 ANOSMIA: ICD-10-CM

## 2020-06-10 DIAGNOSIS — F41.9 ANXIETY DISORDER, UNSPECIFIED: ICD-10-CM

## 2020-06-10 DIAGNOSIS — D64.9 ANEMIA, UNSPECIFIED: ICD-10-CM

## 2020-06-10 DIAGNOSIS — Z98.82 BREAST IMPLANT STATUS: Chronic | ICD-10-CM

## 2020-06-10 DIAGNOSIS — F17.200 NICOTINE DEPENDENCE, UNSPECIFIED, UNCOMPLICATED: ICD-10-CM

## 2020-06-10 PROCEDURE — 99283 EMERGENCY DEPT VISIT LOW MDM: CPT

## 2020-06-10 PROCEDURE — U0003: CPT

## 2020-06-10 NOTE — ED ADULT NURSE NOTE - OBJECTIVE STATEMENT
Pt presents to ED from home, pt alert and orientedx4, VSS afebrile, pt c/o headaceh fro the apst  few days and states that her uncle  but not specifically from covid, pt denies fever, safety maintained pt swabbed and diacharged in no acute distress

## 2020-06-10 NOTE — ED STATDOCS - NSFOLLOWUPINSTRUCTIONS_ED_ALL_ED_FT
Pt here for COVID-19 testing. Pt non toxic. Pt was swabbed for COVID-19 in their car in drive through area. [facility] [employer] will call pt with results. return precautions given. Home self-quarantine recommended. Pt agrees with plan of  care.

## 2020-06-10 NOTE — ED STATDOCS - NS ED ROS FT
ROS: Constitutional- -fever, -chills.  Respiratory- -cough, -SOB  Cardiac- no chest pain, no palpitations, ENT- -rhinorrhea, no sore throat, no congestion, slight loss of taste.  Abdomen- No nausea, no vomiting, no diarrhea.  Urinary- no dysuria, no urgency, no frequency.  Skin- No rashes Neuro- mild HA

## 2020-06-10 NOTE — ED STATDOCS - OBJECTIVE STATEMENT
29F c/o mild HA and slight loss of sense of taste for a few days. pts uncle passed recently and she doesn't think it was from COVID-19 but she isn't sure and wants to get tested.

## 2020-06-10 NOTE — ED STATDOCS - CLINICAL SUMMARY MEDICAL DECISION MAKING FREE TEXT BOX
Pt with mild HA and slight loss of taste, will swab for COVID-19. return precautions given. Pt feeling well at DC, agrees with DC and plan of care.

## 2020-06-10 NOTE — ED STATDOCS - PATIENT PORTAL LINK FT
You can access the FollowMyHealth Patient Portal offered by [facility] by registering at the following website: [email or web address]. By joining [employer]’s FollowMyHealth portal, you will also be able to view your health information using other applications (apps) compatible with our system.

## 2020-06-11 LAB — SARS-COV-2 RNA SPEC QL NAA+PROBE: SIGNIFICANT CHANGE UP

## 2020-06-12 NOTE — DISCHARGE NOTE OB - NS AS DC PROVIDER CONTACT Y/N MULTI
Devin Gupta discharged to home. Patient provided with the following educational materials upon discharge:  See AVS. Valuables and belongings sent with patient.   discharge instructions, medications and follow up appointments reviewed with patient. Patient verbalized understanding.   Yes

## 2020-07-07 NOTE — PROGRESS NOTE BEHAVIORAL HEALTH - NSBHCONSFOLLOWNEEDS_PSY_A_CORE
no psychiatric contraindications to discharge
patient and team 7 residents
no psychiatric contraindications to discharge

## 2020-07-13 NOTE — ED ADULT NURSE NOTE - CAS TRG GEN SKIN COLOR
· Patient presents with presyncope and heart rate of 30 detected by EMTs  Upon arrival to Ed rate up to upper 40s which is patient's baseline  · Patient was recently seen by his doctor for changed his metoprolol to 12 5 mg b i d  Due to decreased heart rate  Hold Metoprolol for now  · Patient has a first-degree AV block or duration of 244 MS    · telemetry overnight   · Cardiology  · ECHO, recent 2019 showed EF 60%  · Stress test pending Pink/Normal for race

## 2020-08-18 NOTE — ED STATDOCS - NSFOLLOWUPINSTRUCTIONS_ED_ALL_ED_FT
DR Verma
Dr. Mendoza
ED
Muscle Strain    WHAT YOU NEED TO KNOW:    A muscle strain is a twist, pull, or tear of a muscle or tendon. A tendon is a strong elastic tissue that connects a muscle to a bone. Signs of a strained muscle include bruising and swelling over the area, pain with movement, and loss of strength.          DISCHARGE INSTRUCTIONS:    Return to the emergency department if:     You suddenly cannot feel or move your injured muscle.        Contact your healthcare provider if:     Your pain and swelling worsen or do not go away.       You have questions or concerns about your condition or care.    Medicines:     NSAIDs help decrease swelling and pain or fever. This medicine is available with or without a doctor's order. NSAIDs can cause stomach bleeding or kidney problems in certain people. If you take blood thinner medicine, always ask your healthcare provider if NSAIDs are safe for you. Always read the medicine label and follow directions.      Muscle relaxers help decrease pain and muscle spasms.      Take your medicine as directed. Contact your healthcare provider if you think your medicine is not helping or if you have side effects. Tell him of her if you are allergic to any medicine. Keep a list of the medicines, vitamins, and herbs you take. Include the amounts, and when and why you take them. Bring the list or the pill bottles to follow-up visits. Carry your medicine list with you in case of an emergency.    Follow up with your healthcare provider as directed: Your healthcare provider may suggest that you have a follow-up visit before you go back to your usual activity. Write down your questions so you remember to ask them during your visits.    Self-care:     3 to 7 days after the injury: Use Rest, Ice, Compression, and Elevation (RICE) to help stop bruising and decrease pain and swelling.  Rest: Rest your muscle to allow your injury to heal. When the pain decreases, begin normal, slow movements. For mild and moderate muscle strains, you should rest your muscles for about 2 days. However, if you have a severe muscle strain, you should rest for 10 to 14 days. You may need to use crutches to walk if your muscle strain is in your legs or lower body.       Ice: Put an ice pack on the injured area. Put a towel between the ice pack and your skin. Do not put the ice pack directly on your skin. You can use a package of frozen peas instead of an ice pack.      Compression: You may need to wrap an elastic bandage around the area to decrease swelling. It should be tight enough for you to feel support. Do not wrap it too tightly.       Elevation: Keep the injured muscle raised above your heart if possible. For example if you have a strain of your lower leg muscle, lie down and prop your leg up on pillows. This helps decrease pain and swelling.      3 to 21 days after the injury: Start to slowly and regularly exercise your muscle. This will help it heal. If you feel pain, decrease how hard you are exercising.       1 to 6 weeks after the injury: Stretch the injured muscle. Hold the stretch for about 30 seconds. Do this 4 times a day. You may stretch the muscle until you feel a slight pull. Stop stretching if you feel pain.       2 weeks to 6 months after the injury: The goal of this phase is to return to the activity you were doing before the injury happened, without hurting the muscle again.       3 weeks to 6 months after the injury: Keep stretching and strengthening your muscles to avoid injury. Slowly increase the time and distance that you exercise. You may have signs and symptoms of muscle strain 6 months after the injury, even if you do things to help it heal. In this case, you may need surgery on the muscle.

## 2021-01-22 ENCOUNTER — APPOINTMENT (OUTPATIENT)
Dept: OBGYN | Facility: CLINIC | Age: 31
End: 2021-01-22

## 2021-01-25 ENCOUNTER — APPOINTMENT (OUTPATIENT)
Dept: PLASTIC SURGERY | Facility: CLINIC | Age: 31
End: 2021-01-25

## 2021-03-15 ENCOUNTER — APPOINTMENT (OUTPATIENT)
Dept: PLASTIC SURGERY | Facility: CLINIC | Age: 31
End: 2021-03-15

## 2021-07-01 ENCOUNTER — EMERGENCY (EMERGENCY)
Facility: HOSPITAL | Age: 31
LOS: 1 days | Discharge: HOME | End: 2021-07-01
Attending: STUDENT IN AN ORGANIZED HEALTH CARE EDUCATION/TRAINING PROGRAM | Admitting: EMERGENCY MEDICINE
Payer: COMMERCIAL

## 2021-07-01 VITALS
SYSTOLIC BLOOD PRESSURE: 142 MMHG | HEART RATE: 87 BPM | DIASTOLIC BLOOD PRESSURE: 94 MMHG | RESPIRATION RATE: 18 BRPM | WEIGHT: 100.09 LBS | TEMPERATURE: 99 F | OXYGEN SATURATION: 100 % | HEIGHT: 72 IN

## 2021-07-01 VITALS
TEMPERATURE: 99 F | DIASTOLIC BLOOD PRESSURE: 82 MMHG | RESPIRATION RATE: 14 BRPM | OXYGEN SATURATION: 100 % | SYSTOLIC BLOOD PRESSURE: 124 MMHG | HEART RATE: 61 BPM

## 2021-07-01 DIAGNOSIS — Z98.82 BREAST IMPLANT STATUS: Chronic | ICD-10-CM

## 2021-07-01 LAB
ALBUMIN SERPL ELPH-MCNC: 4 G/DL — SIGNIFICANT CHANGE UP (ref 3.3–5)
ALP SERPL-CCNC: 54 U/L — SIGNIFICANT CHANGE UP (ref 40–120)
ALT FLD-CCNC: 40 U/L — SIGNIFICANT CHANGE UP (ref 12–78)
ANION GAP SERPL CALC-SCNC: 4 MMOL/L — LOW (ref 5–17)
AST SERPL-CCNC: 19 U/L — SIGNIFICANT CHANGE UP (ref 15–37)
BASOPHILS # BLD AUTO: 0.02 K/UL — SIGNIFICANT CHANGE UP (ref 0–0.2)
BASOPHILS NFR BLD AUTO: 0.4 % — SIGNIFICANT CHANGE UP (ref 0–2)
BILIRUB SERPL-MCNC: 0.3 MG/DL — SIGNIFICANT CHANGE UP (ref 0.2–1.2)
BUN SERPL-MCNC: 17 MG/DL — SIGNIFICANT CHANGE UP (ref 7–23)
CALCIUM SERPL-MCNC: 8.8 MG/DL — SIGNIFICANT CHANGE UP (ref 8.5–10.1)
CHLORIDE SERPL-SCNC: 109 MMOL/L — HIGH (ref 96–108)
CK MB CFR SERPL CALC: <1 NG/ML — SIGNIFICANT CHANGE UP (ref 0–3.6)
CO2 SERPL-SCNC: 29 MMOL/L — SIGNIFICANT CHANGE UP (ref 22–31)
CREAT SERPL-MCNC: 0.86 MG/DL — SIGNIFICANT CHANGE UP (ref 0.5–1.3)
D DIMER BLD IA.RAPID-MCNC: <150 NG/ML DDU — SIGNIFICANT CHANGE UP
EOSINOPHIL # BLD AUTO: 0.04 K/UL — SIGNIFICANT CHANGE UP (ref 0–0.5)
EOSINOPHIL NFR BLD AUTO: 0.7 % — SIGNIFICANT CHANGE UP (ref 0–6)
GLUCOSE SERPL-MCNC: 90 MG/DL — SIGNIFICANT CHANGE UP (ref 70–99)
HCG SERPL-ACNC: <1 MIU/ML — SIGNIFICANT CHANGE UP
HCT VFR BLD CALC: 39.6 % — SIGNIFICANT CHANGE UP (ref 34.5–45)
HGB BLD-MCNC: 13.3 G/DL — SIGNIFICANT CHANGE UP (ref 11.5–15.5)
IMM GRANULOCYTES NFR BLD AUTO: 0.4 % — SIGNIFICANT CHANGE UP (ref 0–1.5)
LYMPHOCYTES # BLD AUTO: 1.61 K/UL — SIGNIFICANT CHANGE UP (ref 1–3.3)
LYMPHOCYTES # BLD AUTO: 29.7 % — SIGNIFICANT CHANGE UP (ref 13–44)
MCHC RBC-ENTMCNC: 28.8 PG — SIGNIFICANT CHANGE UP (ref 27–34)
MCHC RBC-ENTMCNC: 33.6 GM/DL — SIGNIFICANT CHANGE UP (ref 32–36)
MCV RBC AUTO: 85.7 FL — SIGNIFICANT CHANGE UP (ref 80–100)
MONOCYTES # BLD AUTO: 0.38 K/UL — SIGNIFICANT CHANGE UP (ref 0–0.9)
MONOCYTES NFR BLD AUTO: 7 % — SIGNIFICANT CHANGE UP (ref 2–14)
NEUTROPHILS # BLD AUTO: 3.36 K/UL — SIGNIFICANT CHANGE UP (ref 1.8–7.4)
NEUTROPHILS NFR BLD AUTO: 61.8 % — SIGNIFICANT CHANGE UP (ref 43–77)
NRBC # BLD: 0 /100 WBCS — SIGNIFICANT CHANGE UP (ref 0–0)
PLATELET # BLD AUTO: 199 K/UL — SIGNIFICANT CHANGE UP (ref 150–400)
POTASSIUM SERPL-MCNC: 4.3 MMOL/L — SIGNIFICANT CHANGE UP (ref 3.5–5.3)
POTASSIUM SERPL-SCNC: 4.3 MMOL/L — SIGNIFICANT CHANGE UP (ref 3.5–5.3)
PROT SERPL-MCNC: 6.7 G/DL — SIGNIFICANT CHANGE UP (ref 6–8.3)
RBC # BLD: 4.62 M/UL — SIGNIFICANT CHANGE UP (ref 3.8–5.2)
RBC # FLD: 11.9 % — SIGNIFICANT CHANGE UP (ref 10.3–14.5)
SODIUM SERPL-SCNC: 142 MMOL/L — SIGNIFICANT CHANGE UP (ref 135–145)
TROPONIN I SERPL-MCNC: <.015 NG/ML — SIGNIFICANT CHANGE UP (ref 0.01–0.04)
WBC # BLD: 5.43 K/UL — SIGNIFICANT CHANGE UP (ref 3.8–10.5)
WBC # FLD AUTO: 5.43 K/UL — SIGNIFICANT CHANGE UP (ref 3.8–10.5)

## 2021-07-01 PROCEDURE — 70450 CT HEAD/BRAIN W/O DYE: CPT | Mod: 26,MA

## 2021-07-01 PROCEDURE — 93010 ELECTROCARDIOGRAM REPORT: CPT

## 2021-07-01 PROCEDURE — 71046 X-RAY EXAM CHEST 2 VIEWS: CPT | Mod: 26

## 2021-07-01 PROCEDURE — 99285 EMERGENCY DEPT VISIT HI MDM: CPT

## 2021-07-01 PROCEDURE — 71046 X-RAY EXAM CHEST 2 VIEWS: CPT

## 2021-07-01 PROCEDURE — 84484 ASSAY OF TROPONIN QUANT: CPT

## 2021-07-01 PROCEDURE — 70450 CT HEAD/BRAIN W/O DYE: CPT | Mod: MA

## 2021-07-01 PROCEDURE — 96361 HYDRATE IV INFUSION ADD-ON: CPT

## 2021-07-01 PROCEDURE — 93005 ELECTROCARDIOGRAM TRACING: CPT

## 2021-07-01 PROCEDURE — 85379 FIBRIN DEGRADATION QUANT: CPT

## 2021-07-01 PROCEDURE — 99284 EMERGENCY DEPT VISIT MOD MDM: CPT | Mod: 25

## 2021-07-01 PROCEDURE — 80053 COMPREHEN METABOLIC PANEL: CPT

## 2021-07-01 PROCEDURE — 84702 CHORIONIC GONADOTROPIN TEST: CPT

## 2021-07-01 PROCEDURE — 36415 COLL VENOUS BLD VENIPUNCTURE: CPT

## 2021-07-01 PROCEDURE — 85025 COMPLETE CBC W/AUTO DIFF WBC: CPT

## 2021-07-01 PROCEDURE — 82553 CREATINE MB FRACTION: CPT

## 2021-07-01 PROCEDURE — 96374 THER/PROPH/DIAG INJ IV PUSH: CPT

## 2021-07-01 RX ORDER — SODIUM CHLORIDE 9 MG/ML
1000 INJECTION INTRAMUSCULAR; INTRAVENOUS; SUBCUTANEOUS ONCE
Refills: 0 | Status: COMPLETED | OUTPATIENT
Start: 2021-07-01 | End: 2021-07-01

## 2021-07-01 RX ORDER — ONDANSETRON 8 MG/1
4 TABLET, FILM COATED ORAL ONCE
Refills: 0 | Status: COMPLETED | OUTPATIENT
Start: 2021-07-01 | End: 2021-07-01

## 2021-07-01 RX ADMIN — ONDANSETRON 4 MILLIGRAM(S): 8 TABLET, FILM COATED ORAL at 21:40

## 2021-07-01 RX ADMIN — SODIUM CHLORIDE 1000 MILLILITER(S): 9 INJECTION INTRAMUSCULAR; INTRAVENOUS; SUBCUTANEOUS at 22:40

## 2021-07-01 RX ADMIN — SODIUM CHLORIDE 1000 MILLILITER(S): 9 INJECTION INTRAMUSCULAR; INTRAVENOUS; SUBCUTANEOUS at 21:40

## 2021-07-01 NOTE — ED PROVIDER NOTE - NSFOLLOWUPINSTRUCTIONS_ED_ALL_ED_FT
Follow up with pcp  drink plenty of water  return to er for any worsening symptoms    Dehydration    WHAT YOU NEED TO KNOW:    What is dehydration? Dehydration is a condition that develops when your body does not have enough fluid. You may become dehydrated if you do not drink enough water or lose too much fluid. Fluid loss may also cause loss of electrolytes (minerals), such as sodium.    What increases my risk for dehydration?   •Vomiting, diarrhea, or fever      •Being in the sun or heat for too long      •Sweating while playing sports      •Diseases, such as stroke, diabetes, or infections      •Medicines that cause you to lose water and salt, such as diuretics (water pills)      •Older age with decreased ability to sense thirst or to urinate      What are the signs and symptoms of dehydration?   •Dry eyes or mouth      •Increased thirst      •Dark yellow urine      •Urinating little or not at all      •Tiredness or body weakness      •Headache, dizziness, or confusion      •Irregular or fast breathing, fast or pounding heartbeat, and low blood pressure      •Sudden weight loss      How is dehydration diagnosed? Your healthcare provider will examine you and check your breathing and heartbeat. He or she will look at your eyes, skin, mouth, and tongue. He or she will ask you how much liquid you have been drinking, and how much you are urinating. Tell him or her if you have been vomiting or have diarrhea. Blood and urine tests are used to check your electrolyte levels. The tests may show the cause of your dehydration, such as infection or diabetes. They may also show if your kidneys are working correctly.    How is dehydration treated?   •Oral liquids: ?If you are mildly to moderately dehydrated, you may need an oral rehydration solution (ORS). This drink contains the right amount of salt, sugar, and minerals in water to replace body fluids. Ask your healthcare provider where you can get an ORS.       ?Drink an ORS in small amounts if you have been vomiting. If you vomit, wait 30 minutes and try again. Ask healthcare providers how much ORS you need when you are dehydrated and how often you should drink it.       ?A sports drink is not  the same as an ORS. Do not drink sports drinks without asking your healthcare provider.      ?Do not drink soft drinks or fruit juices. These can make your condition worse.       •You may receive fluid through an IV. Electrolytes may also be included in the fluid.      •Hypodermoclysis gives your body a large amount of water quickly. The water is given into the deepest layer of your skin. Ask your healthcare provider for more information about hypodermoclysis.      What can I do to prevent dehydration?   •Drink liquids as directed. Liquids that contain water, sugar, and minerals can help your body hold in fluid and help prevent dehydration. Drink liquids throughout the day, not just when you feel thirsty. Men should drink about 3 liters (13 eight-ounce cups) of liquid each day. Women should drink about 2 liters (9 eight-ounce cups) of liquid each day. Drink even more liquid if you will be outdoors, in the sun for a long time, or exercising.       •Stay cool. Limit the time you spend outdoors during the hottest part of the day. Dress in lightweight clothes.       •Keep track of how often you urinate. If you urinate less than usual or your urine is darker, drink more liquids.      When should I seek immediate care?   •You have a seizure.      •You are confused or cannot think clearly.      •You are extremely sleepy, or another person cannot wake you.       •You become dizzy or faint when you stand.      •You are not able to urinate.      •You have trouble breathing.      •You have a fast or irregular heartbeat.      •Your hands or feet are cold, or your face is pale.       When should I contact my healthcare provider?   •You have trouble drinking liquids because you are vomiting.      •Your symptoms get worse.      •You have a fever.       •You feel very weak or tired.      •You have questions or concerns about your condition or care.      CARE AGREEMENT:    You have the right to help plan your care. Learn about your health condition and how it may be treated. Discuss treatment options with your healthcare providers to decide what care you want to receive. You always have the right to refuse treatment.

## 2021-07-01 NOTE — ED ADULT NURSE NOTE - OBJECTIVE STATEMENT
30 yr old female c/o chest pressure since today. A+O x 4. S/O at the bedside. Denies pain, palpitations, sob, back pain, NV, abdominal pain, recent travel/sick contacts. S1/S2. NSR on ekg. Resp even and unlabored on room air. Lungs clear benjamin. skin warm dry and intact. VSS. will continue to monitor.

## 2021-07-01 NOTE — ED PROVIDER NOTE - CLINICAL SUMMARY MEDICAL DECISION MAKING FREE TEXT BOX
Pt is a 31 yo female with dizziness nausea and pleuritic chest pain will get ekg labs ct head give fluids and reeval

## 2021-07-01 NOTE — ED PROVIDER NOTE - OBJECTIVE STATEMENT
Pt is a 31 yo female with pmhx of Anemia Anxiety Eclampsia Obsessive-compulsive disorder, unspecified type here for "brain fog" which started today states feels slower today with associated nausea and dizziness which has resolved. Pt also c/o of pleuritic chest pain denies any leg swelling recent travels ocp alcohol or drugs. Pt vaccinated for covid 03/21. Pt denies any numbness tingling focal weakness.

## 2021-07-01 NOTE — ED PROVIDER NOTE - ATTENDING CONTRIBUTION TO CARE
29yo F ho HELLP syndrome, gestational htn, depression/anxiety, pw sensation of chest pain, sob, head spinning, "brain fog" where she felt like she wasn't as clear as usual and that her speech slowed. currently feels improved and no longer with spinning sensation  does not feel like her anxiety  will check labs, ekg, fluids, reassess

## 2021-07-01 NOTE — ED PROVIDER NOTE - PATIENT PORTAL LINK FT
You can access the FollowMyHealth Patient Portal offered by [facility] by registering at the following website: [email or web address]. By joining [employer]’s FollowMyHealth portal, you will also be able to view your health information using other applications (apps) compatible with our system.

## 2021-10-14 ENCOUNTER — APPOINTMENT (OUTPATIENT)
Dept: OBGYN | Facility: CLINIC | Age: 31
End: 2021-10-14
Payer: COMMERCIAL

## 2021-10-14 VITALS
WEIGHT: 103 LBS | HEIGHT: 62 IN | DIASTOLIC BLOOD PRESSURE: 96 MMHG | BODY MASS INDEX: 18.95 KG/M2 | SYSTOLIC BLOOD PRESSURE: 154 MMHG

## 2021-10-14 DIAGNOSIS — Z82.49 FAMILY HISTORY OF ISCHEMIC HEART DISEASE AND OTHER DISEASES OF THE CIRCULATORY SYSTEM: ICD-10-CM

## 2021-10-14 DIAGNOSIS — F17.210 NICOTINE DEPENDENCE, CIGARETTES, UNCOMPLICATED: ICD-10-CM

## 2021-10-14 LAB — HCG UR QL: NEGATIVE

## 2021-10-14 PROCEDURE — 81025 URINE PREGNANCY TEST: CPT

## 2021-10-14 PROCEDURE — 99385 PREV VISIT NEW AGE 18-39: CPT | Mod: 25

## 2021-10-14 NOTE — HISTORY OF PRESENT ILLNESS
[Patient reported PAP Smear was normal] : Patient reported PAP Smear was normal [Frequency: Q ___ days] : menstrual periods occur approximately every [unfilled] days [Menarche Age: ____] : age at menarche was [unfilled] [Menstrual Cramps] : menstrual cramps [Currently Active] : currently active [Both] : men and women [Vaginal] : vaginal [Oral] : oral [No] : No [Yes] : Yes [Patient would like to be screened for STIs] : Patient would like to be screened for STIs [PapSmeardate] : 2020 [FreeTextEntry1] : 09/11/21 [FreeTextEntry3] : Does not use contraception

## 2021-10-14 NOTE — PLAN
[FreeTextEntry1] : 31  LMP 22 presents for annual\par \par #HCM\par -pap up to date as per patient\par -f/u STI testing\par \par #contraception\par -Rx for Lo-estrin sent \par

## 2021-10-15 LAB
HBV SURFACE AG SER QL: NONREACTIVE
HIV1+2 AB SPEC QL IA.RAPID: NONREACTIVE
T PALLIDUM AB SER QL IA: NEGATIVE

## 2021-10-16 ENCOUNTER — NON-APPOINTMENT (OUTPATIENT)
Age: 31
End: 2021-10-16

## 2021-10-16 LAB
C TRACH RRNA SPEC QL NAA+PROBE: DETECTED
HCV RNA SERPL NAA DL=5-ACNC: NOT DETECTED IU/ML
HCV RNA SERPL NAA+PROBE-LOG IU: NOT DETECTED LOG10IU/ML
N GONORRHOEA RRNA SPEC QL NAA+PROBE: NOT DETECTED
SOURCE AMPLIFICATION: NORMAL

## 2021-10-16 RX ORDER — AZITHROMYCIN 250 MG/1
250 TABLET, FILM COATED ORAL
Qty: 4 | Refills: 0 | Status: ACTIVE | COMMUNITY
Start: 2021-10-16 | End: 1900-01-01

## 2021-10-16 RX ORDER — DOXYCYCLINE HYCLATE 100 MG/1
100 TABLET ORAL TWICE DAILY
Qty: 14 | Refills: 0 | Status: DISCONTINUED | COMMUNITY
Start: 2021-10-15 | End: 2021-10-16

## 2021-10-21 NOTE — PROVIDER CONTACT NOTE (OTHER) - REASON
Patient reports blurred vision & headache, nausea Special Stains Stage 10 - Results: Base On Clearance Noted Above

## 2022-02-16 ENCOUNTER — EMERGENCY (EMERGENCY)
Facility: HOSPITAL | Age: 32
LOS: 1 days | Discharge: HOME | End: 2022-02-16
Attending: EMERGENCY MEDICINE | Admitting: EMERGENCY MEDICINE
Payer: COMMERCIAL

## 2022-02-16 VITALS
RESPIRATION RATE: 18 BRPM | SYSTOLIC BLOOD PRESSURE: 146 MMHG | TEMPERATURE: 98 F | OXYGEN SATURATION: 100 % | WEIGHT: 100.09 LBS | HEART RATE: 66 BPM | HEIGHT: 72 IN | DIASTOLIC BLOOD PRESSURE: 99 MMHG

## 2022-02-16 VITALS
RESPIRATION RATE: 18 BRPM | DIASTOLIC BLOOD PRESSURE: 76 MMHG | OXYGEN SATURATION: 100 % | HEART RATE: 66 BPM | SYSTOLIC BLOOD PRESSURE: 118 MMHG | TEMPERATURE: 97 F

## 2022-02-16 DIAGNOSIS — Z98.82 BREAST IMPLANT STATUS: Chronic | ICD-10-CM

## 2022-02-16 PROCEDURE — 99283 EMERGENCY DEPT VISIT LOW MDM: CPT

## 2022-02-16 PROCEDURE — 99283 EMERGENCY DEPT VISIT LOW MDM: CPT | Mod: 25

## 2022-02-16 PROCEDURE — 73130 X-RAY EXAM OF HAND: CPT | Mod: 26,LT

## 2022-02-16 PROCEDURE — 73130 X-RAY EXAM OF HAND: CPT

## 2022-02-16 NOTE — ED PROVIDER NOTE - PATIENT PORTAL LINK FT
You can access the FollowMyHealth Patient Portal offered by [facility] by registering at the following website: [email or web address]. By joining [employer]’s FollowMyHealth portal, you will also be able to view your health information using other applications (apps) compatible with our system.

## 2022-02-16 NOTE — ED ADULT NURSE NOTE - OBJECTIVE STATEMENT
Received pt alert and orientated pt is able to move all extremities. Pt denies trauma and injury pt has her left hand bruising and swelling with some tingling in fingers. Safety/Comfort maintained. Will continue to monitor. Freq rounding performed.

## 2022-02-16 NOTE — ED PROVIDER NOTE - MUSCULOSKELETAL, MLM
L hand: +mild swelling with bruising and ttp noted to palmar aspect of L hand, skin intact, all fingers of L hand NT with FROM, able to make a fist, cap refill<2sec, pulses and sensation intact, no wrist drop, no snuffbox tenderness, wrist NT with FROM, no other bruising noted, NVI, L proximal arm NT with FROM.

## 2022-02-16 NOTE — ED PROVIDER NOTE - MUSCULOSKELETAL [+], MLM
[facility]           Phone: [phone]             Outpatient Physical Therapy  Fax: [phone]                                           Date: 2021  Patient: Warren Leyva : 1931 CSN #: [id number]   Referring Practitioner:  Sintia Mayes DO Referral Date:  10/19/21       [x] Plan of Care   [] Updated Plan of Care    Dates of Service to Include: 2021 to 21    Diagnosis:  Leg weakness    Rehab (Treatment) Diagnosis:  generalized weakness, difficulty walking             Onset Date:  10/19/21    Attendance  Total # of Visits to Date: 1 No Show: 0 Canceled Appointment: 0    Assessment  Assessment: Pt is a 80 y/o male who presents to PT with generalized weakness and decreased activity tolerance. Pt currently requires use of manual W/C for majority of ambulation, but is able to walk short distances with walking stick. Pt demonstrates bent knee gait with ambulation and requires frequent seated rest breaks throughout evaluation d/t fatigue and limited endurance. Pt demonstrates decreased BLE strength with MMT, which was tested as follows: Hip 4-/5, knee flex 4/5, ext 4-/5, PF/DF 3+ to 4-/5. Pt performs TUG with walking stick in 17 seconds. Pt will benefit from skilled PT services to address the above impairments and to work toward established functional goals. Pt and caregiver expressing interest in aquatic therapy program to improve pt tolerance for exercise program.      Goals  Short term goals  Time Frame for Short term goals: 3 weeks  Short term goal 1: Pt will initiate HEP. Short term goal 2: Pt will initiate aquatic therapy program to assist with improving LE strengthening and endurance. Long term goals  Time Frame for Long term goals : 6 weeks  Long term goal 1: Pt will be independent and compliant with HEP.   Long term goal 2: Pt will safely perform TUG with LRAD in </= 13 seconds to decrease risk L hand pain/swelling/bruising

## 2022-02-16 NOTE — ED ADULT NURSE NOTE - CAS EDN DISCHARGE INTERVENTIONS
aortic valve by pullback  post cineangiography.     LEFT VENTRICULOGRAM:  Left ventriculogram demonstrates global  hypokinesis. Estimated ejection fraction is 25%.     LEFT MAIN:  Left main is free of significant obstructive disease.     LEFT ANTERIOR DESCENDING:  The LAD courses to and wraps around the apex. It gives off a relatively large bifurcating first diagonal branch and a  moderate-size second diagonal branch. The large first diagonal branch  had an 80% proximal stenosis. The LAD proper had severe mid vessel  disease with a very irregular area of subtotal stenosis in the mid  vessel. It then coursed on towards the apex. There is a 90% stenosis  at the apex.     LEFT CIRCUMFLEX:  Circumflex gives rise to a small-to-moderate first  marginal branch, a large second marginal branch, and terminates in  moderate-size posterolateral branch. There appears to be ostial disease  which is difficult to quantitate but appears to be in excess of 70%. The circumflex then has 90% stenosis before the distal posterolateral  branch.     RIGHT CORONARY ARTERY:  Right coronary artery is a dominant vessel. It  gives off a moderate-size PDA and two posterolateral branches. There is  diffuse disease throughout the body of the right coronary artery and 90%  stenosis in the PDA.     IMPRESSION:  1. Severe LV dysfunction. Estimated ejection fraction is 25%. 2.  Severe multivessel coronary artery disease as described above. 3.  I recommend surgical consultation. 4.  Successful Angio-Seal of the right femoral arteriotomy site. Scanned EKG on 12/6/19 with atrial fibrillation    TTE 11/21/19   Conclusions      Summary   Limited study. Left ventricular cavity size is normal. There is mild concentric left   ventricular hypertrophy. Overall left ventricular systolic function appears severely reduced with an   ejection fraction of 20-25%. There is severe diffuse hypokinesis.    No evidence of left ventricular mass or thrombus noted with Definity. Mitral annular calcification is present. The right ventricle is enlarged. TAPSE measures 1.42 cm. The left atrium is mildly dilated. IVC size is dilated (>2.1 cm) and collapses < 50% with respiration   consistent with elevated RA pressure (15 mmHg). CTA Head and Neck 7/4/19  Impression       1. Minimal atherosclerotic changes in the carotid bifurcations. No significant vascular abnormality otherwise. No significant arterial stenosis. 2. Incidental 5 mm nodule in the right upper lobe and 4 mm nodule in the left upper lobe. Following the Fleischner Society 2017 guidelines for single solid nodules <6 mm, if patient is low risk no routine follow-up is suggested unless suspicious    morphology or upper lobe location.  If patient is high risk, then optional CT at 12 months is suggested.       IMPRESSION:       1. No evidence of large vessel occlusion or significant intracranial vascular abnormality. 2. No acute intracranial abnormality. 3. Rightward deviation of the nasal septum with bilateral maxillary sinus small mucous retention cysts or polyps.     4. Incidental pulmonary nodules as described in the neck portion of the dictation. CT Abdomen and Pelvis 11/20/19  Impression       1. Bilateral small pleural effusions. 2. Partially visualized mildly enlarged mediastinal subcarinal lymph nodes which are nonspecific/indeterminate. Recommended follow-up with dedicated CT chest.   3. Cholelithiasis. 4. 7.0 x 3.3 cm large calculus in the urinary bladder. 5. Irregular bladder wall thickening with evidence of diverticuli, trabeculation and sacculations suggestive of chronic outlet obstruction. 6. Bilateral mild hydroureteronephrosis likely related to back pressure changes from chronic bladder outlet obstruction.    7. Mild prostatomegaly.             Assessment:      Mr. Rodo Joya is a 80year old man who presents for evaluation of presumed ischemic cardiomyopathy with no iv cannula

## 2022-02-16 NOTE — ED PROVIDER NOTE - CLINICAL SUMMARY MEDICAL DECISION MAKING FREE TEXT BOX
32 y/o F with hx of anemia, ocd, anxiety presents with c/o L hand pain today. Pt states that she noticed some pain to the palm of her L hand this afternoon, looked at her hand and noticed swelling and bruising with some tingling occasionally in a few fingers of L hand. Pt is LHD. Denies numbness, trauma/fall, weakness or other symptoms. PE: as above A/P: L hand pain/bruising; will get xrays r/o fx, pt declined pain meds now, will have pt f/u hand specialist.

## 2022-02-16 NOTE — ED PROVIDER NOTE - CARE PROVIDER_API CALL
Dio Yañez)  Orthopaedic Surgery; Surgery of the Hand  [address] [place] [address]  Phone: [phone]  Fax: [phone]  Follow Up Time: 1-3 Days

## 2022-02-16 NOTE — ED PROVIDER NOTE - NSICDXPASTMEDICALHX_GEN_ALL_CORE_FT
PAST MEDICAL HISTORY:  Anemia     Anxiety     Anxiety     Eclampsia     Obsessive-compulsive disorder, unspecified type

## 2022-02-16 NOTE — ED PROVIDER NOTE - OBJECTIVE STATEMENT
30 y/o F with hx of anemia, ocd, anxiety presents with c/o L hand pain today. Pt states that she noticed some pain to the palm of her L hand this afternoon, looked at her hand and noticed swelling and bruising with some tingling occasionally in a few fingers of L hand. Pt is LHD. Denies numbness, trauma/fall, weakness or other symptoms.

## 2022-02-16 NOTE — ED PROVIDER NOTE - NSFOLLOWUPINSTRUCTIONS_ED_ALL_ED_FT
Follow up with hand specialist in 2-3 days for re-evaluation, ongoing care and treatment. Rest, weight bearing as tolerated, ice compresses, motrin or tylenol as directed for pain. If having worsening of symptoms or other related symptoms, RETURN TO THE ER IMMEDIATELY.

## 2022-03-29 ENCOUNTER — APPOINTMENT (OUTPATIENT)
Dept: OBGYN | Facility: CLINIC | Age: 32
End: 2022-03-29
Payer: COMMERCIAL

## 2022-03-29 VITALS
SYSTOLIC BLOOD PRESSURE: 129 MMHG | DIASTOLIC BLOOD PRESSURE: 85 MMHG | BODY MASS INDEX: 19.51 KG/M2 | WEIGHT: 106 LBS | HEIGHT: 62 IN

## 2022-03-29 DIAGNOSIS — Z87.898 PERSONAL HISTORY OF OTHER SPECIFIED CONDITIONS: ICD-10-CM

## 2022-03-29 DIAGNOSIS — Z30.09 ENCOUNTER FOR OTHER GENERAL COUNSELING AND ADVICE ON CONTRACEPTION: ICD-10-CM

## 2022-03-29 DIAGNOSIS — A56.09 OTHER CHLAMYDIAL INFECTION OF LOWER GENITOURINARY TRACT: ICD-10-CM

## 2022-03-29 DIAGNOSIS — N94.3 PREMENSTRUAL TENSION SYNDROME: ICD-10-CM

## 2022-03-29 DIAGNOSIS — Z34.90 ENCOUNTER FOR SUPERVISION OF NORMAL PREGNANCY, UNSPECIFIED, UNSPECIFIED TRIMESTER: ICD-10-CM

## 2022-03-29 PROCEDURE — 99213 OFFICE O/P EST LOW 20 MIN: CPT

## 2022-03-29 NOTE — PLAN
[FreeTextEntry1] : Labial lesion does not look like herpes\par Culture taken\par Discuss OCP for PMDD. Patient has tried it in the past and had emotional issues with it\par Advise to talk to psychiatrist and maybe adjust medication\par Discuss future pregnancy is ill advised given last experience

## 2022-03-29 NOTE — PHYSICAL EXAM
[Labia Majora] : normal [Labia Minora] : normal [Normal] : normal [Uterine Adnexae] : normal [FreeTextEntry1] : small  cut on left side of labia. Non painful

## 2022-03-29 NOTE — HISTORY OF PRESENT ILLNESS
[FreeTextEntry1] : Patient with "cut " on left outer side of vagina\par does shave \par Has no history of HSV \par Is in same sex relationship\par Patient also complaining of bad PMDD\par She is currently on Lexapro 20 mg for PTSD secondary to post partum PEC necessitating ICU admission and cardiac arrest

## 2022-03-30 ENCOUNTER — TRANSCRIPTION ENCOUNTER (OUTPATIENT)
Age: 32
End: 2022-03-30

## 2022-03-30 LAB
C TRACH RRNA SPEC QL NAA+PROBE: NOT DETECTED
HSV+VZV DNA SPEC QL NAA+PROBE: NOT DETECTED
N GONORRHOEA RRNA SPEC QL NAA+PROBE: NOT DETECTED
SOURCE AMPLIFICATION: NORMAL
SPECIMEN SOURCE: NORMAL

## 2022-06-23 NOTE — ED PROVIDER NOTE - NS HIV RISK FACTOR YES
Declined No Repair - Repaired With Adjacent Surgical Defect Text (Leave Blank If You Do Not Want): After obtaining clear surgical margins the defect was repaired concurrently with another surgical defect which was in close approximation.

## 2022-10-13 DIAGNOSIS — Z33.2 ENCOUNTER FOR ELECTIVE TERMINATION OF PREGNANCY: ICD-10-CM

## 2022-10-17 LAB
ABO + RH PNL BLD: NORMAL
BASOPHILS # BLD AUTO: 0.03 K/UL
BASOPHILS NFR BLD AUTO: 0.5 %
EOSINOPHIL # BLD AUTO: 0.03 K/UL
EOSINOPHIL NFR BLD AUTO: 0.5 %
HCT VFR BLD CALC: 41.6 %
HGB BLD-MCNC: 13.6 G/DL
IMM GRANULOCYTES NFR BLD AUTO: 0.2 %
LYMPHOCYTES # BLD AUTO: 1.64 K/UL
LYMPHOCYTES NFR BLD AUTO: 26.8 %
MAN DIFF?: NORMAL
MCHC RBC-ENTMCNC: 29.2 PG
MCHC RBC-ENTMCNC: 32.7 GM/DL
MCV RBC AUTO: 89.3 FL
MONOCYTES # BLD AUTO: 0.38 K/UL
MONOCYTES NFR BLD AUTO: 6.2 %
NEUTROPHILS # BLD AUTO: 4.02 K/UL
NEUTROPHILS NFR BLD AUTO: 65.8 %
PLATELET # BLD AUTO: 187 K/UL
RBC # BLD: 4.66 M/UL
RBC # FLD: 12 %
WBC # FLD AUTO: 6.11 K/UL

## 2022-10-18 ENCOUNTER — APPOINTMENT (OUTPATIENT)
Dept: OBGYN | Facility: CLINIC | Age: 32
End: 2022-10-18

## 2022-10-18 VITALS
DIASTOLIC BLOOD PRESSURE: 66 MMHG | WEIGHT: 100 LBS | SYSTOLIC BLOOD PRESSURE: 130 MMHG | BODY MASS INDEX: 18.4 KG/M2 | HEIGHT: 62 IN

## 2022-10-18 DIAGNOSIS — Z34.90 ENCOUNTER FOR SUPERVISION OF NORMAL PREGNANCY, UNSPECIFIED, UNSPECIFIED TRIMESTER: ICD-10-CM

## 2022-10-18 PROCEDURE — 99214 OFFICE O/P EST MOD 30 MIN: CPT | Mod: 25

## 2022-10-18 PROCEDURE — 76815 OB US LIMITED FETUS(S): CPT

## 2022-10-18 RX ORDER — NIFEDIPINE 30 MG/1
30 TABLET, EXTENDED RELEASE ORAL DAILY
Qty: 90 | Refills: 3 | Status: COMPLETED | COMMUNITY
Start: 2017-12-19 | End: 2022-10-18

## 2022-10-18 RX ORDER — CLONAZEPAM 1 MG/1
1 TABLET ORAL 3 TIMES DAILY
Refills: 0 | Status: ACTIVE | COMMUNITY
Start: 2022-10-18

## 2022-10-18 RX ORDER — NORETHINDRONE ACETATE AND ETHINYL ESTRADIOL 1; 20 MG/1; UG/1
1-20 TABLET ORAL
Qty: 3 | Refills: 3 | Status: COMPLETED | COMMUNITY
Start: 2021-10-14 | End: 2022-10-18

## 2022-10-18 RX ORDER — BUSPIRONE HYDROCHLORIDE 15 MG/1
15 TABLET ORAL
Refills: 0 | Status: ACTIVE | COMMUNITY
Start: 2022-10-18

## 2022-10-18 RX ORDER — LAMOTRIGINE 150 MG/1
150 TABLET ORAL DAILY
Refills: 0 | Status: ACTIVE | COMMUNITY
Start: 2022-10-18

## 2022-10-18 RX ORDER — IBUPROFEN 600 MG/1
600 TABLET, FILM COATED ORAL 3 TIMES DAILY
Qty: 6 | Refills: 0 | Status: ACTIVE | COMMUNITY
Start: 2022-10-18 | End: 1900-01-01

## 2022-10-18 RX ORDER — MISOPROSTOL 200 UG/1
200 TABLET ORAL
Qty: 8 | Refills: 0 | Status: ACTIVE | COMMUNITY
Start: 2022-10-18 | End: 1900-01-01

## 2022-10-18 RX ORDER — OXYCODONE AND ACETAMINOPHEN 5; 325 MG/1; MG/1
5-325 TABLET ORAL
Qty: 6 | Refills: 0 | Status: ACTIVE | COMMUNITY
Start: 2022-10-18 | End: 1900-01-01

## 2022-10-18 RX ORDER — ONDANSETRON 4 MG/1
4 TABLET, ORALLY DISINTEGRATING ORAL
Qty: 5 | Refills: 0 | Status: ACTIVE | COMMUNITY
Start: 2022-10-18 | End: 1900-01-01

## 2022-10-19 LAB
C TRACH RRNA SPEC QL NAA+PROBE: NOT DETECTED
N GONORRHOEA RRNA SPEC QL NAA+PROBE: NOT DETECTED
SOURCE AMPLIFICATION: NORMAL

## 2022-11-15 ENCOUNTER — NON-APPOINTMENT (OUTPATIENT)
Age: 32
End: 2022-11-15

## 2022-11-16 NOTE — PROVIDER CONTACT NOTE (OTHER) - SITUATION
/100
Elevated blood pressure 142/88
Pt complaining of headache
initiation of breastfeeding/breast milk feeding

## 2022-11-23 ENCOUNTER — APPOINTMENT (OUTPATIENT)
Dept: OBGYN | Facility: CLINIC | Age: 32
End: 2022-11-23

## 2022-11-23 VITALS — DIASTOLIC BLOOD PRESSURE: 85 MMHG | SYSTOLIC BLOOD PRESSURE: 139 MMHG | WEIGHT: 106 LBS | BODY MASS INDEX: 19.39 KG/M2

## 2022-11-23 DIAGNOSIS — Z98.890 OTHER SPECIFIED POSTPROCEDURAL STATES: ICD-10-CM

## 2022-11-23 PROCEDURE — 76830 TRANSVAGINAL US NON-OB: CPT | Mod: 26

## 2022-11-23 PROCEDURE — 99214 OFFICE O/P EST MOD 30 MIN: CPT

## 2022-11-28 LAB — HCG SERPL-MCNC: 22 MIU/ML

## 2022-12-01 LAB — HCG SERPL-MCNC: 4 MIU/ML

## 2022-12-06 LAB — HCG SERPL-MCNC: 2 MIU/ML

## 2022-12-28 ENCOUNTER — NON-APPOINTMENT (OUTPATIENT)
Age: 32
End: 2022-12-28

## 2023-03-06 ENCOUNTER — EMERGENCY (EMERGENCY)
Facility: HOSPITAL | Age: 33
LOS: 1 days | Discharge: HOME | End: 2023-03-06
Attending: EMERGENCY MEDICINE | Admitting: EMERGENCY MEDICINE
Payer: COMMERCIAL

## 2023-03-06 VITALS
HEART RATE: 70 BPM | DIASTOLIC BLOOD PRESSURE: 72 MMHG | OXYGEN SATURATION: 100 % | SYSTOLIC BLOOD PRESSURE: 115 MMHG | RESPIRATION RATE: 18 BRPM

## 2023-03-06 VITALS
DIASTOLIC BLOOD PRESSURE: 79 MMHG | OXYGEN SATURATION: 99 % | RESPIRATION RATE: 20 BRPM | HEIGHT: 61 IN | SYSTOLIC BLOOD PRESSURE: 129 MMHG | WEIGHT: 106.92 LBS | TEMPERATURE: 98 F | HEART RATE: 74 BPM

## 2023-03-06 DIAGNOSIS — Z98.82 BREAST IMPLANT STATUS: Chronic | ICD-10-CM

## 2023-03-06 LAB
ALBUMIN SERPL ELPH-MCNC: 4.1 G/DL — SIGNIFICANT CHANGE UP (ref 3.3–5)
ALP SERPL-CCNC: 61 U/L — SIGNIFICANT CHANGE UP (ref 30–120)
ALT FLD-CCNC: 36 U/L DA — SIGNIFICANT CHANGE UP (ref 10–60)
ANION GAP SERPL CALC-SCNC: 10 MMOL/L — SIGNIFICANT CHANGE UP (ref 5–17)
APPEARANCE UR: CLEAR — SIGNIFICANT CHANGE UP
AST SERPL-CCNC: 22 U/L — SIGNIFICANT CHANGE UP (ref 10–40)
BASOPHILS # BLD AUTO: 0.03 K/UL — SIGNIFICANT CHANGE UP (ref 0–0.2)
BASOPHILS NFR BLD AUTO: 0.7 % — SIGNIFICANT CHANGE UP (ref 0–2)
BILIRUB SERPL-MCNC: 0.5 MG/DL — SIGNIFICANT CHANGE UP (ref 0.2–1.2)
BILIRUB UR-MCNC: NEGATIVE — SIGNIFICANT CHANGE UP
BUN SERPL-MCNC: 14 MG/DL — SIGNIFICANT CHANGE UP (ref 7–23)
CALCIUM SERPL-MCNC: 9.1 MG/DL — SIGNIFICANT CHANGE UP (ref 8.4–10.5)
CHLORIDE SERPL-SCNC: 102 MMOL/L — SIGNIFICANT CHANGE UP (ref 96–108)
CO2 SERPL-SCNC: 30 MMOL/L — SIGNIFICANT CHANGE UP (ref 22–31)
COLOR SPEC: YELLOW — SIGNIFICANT CHANGE UP
CREAT SERPL-MCNC: 0.87 MG/DL — SIGNIFICANT CHANGE UP (ref 0.5–1.3)
DIFF PNL FLD: NEGATIVE — SIGNIFICANT CHANGE UP
EGFR: 91 ML/MIN/1.73M2 — SIGNIFICANT CHANGE UP
EOSINOPHIL # BLD AUTO: 0.03 K/UL — SIGNIFICANT CHANGE UP (ref 0–0.5)
EOSINOPHIL NFR BLD AUTO: 0.7 % — SIGNIFICANT CHANGE UP (ref 0–6)
FLUAV AG NPH QL: SIGNIFICANT CHANGE UP
FLUBV AG NPH QL: SIGNIFICANT CHANGE UP
GLUCOSE SERPL-MCNC: 94 MG/DL — SIGNIFICANT CHANGE UP (ref 70–99)
GLUCOSE UR QL: NEGATIVE MG/DL — SIGNIFICANT CHANGE UP
HCG UR QL: NEGATIVE — SIGNIFICANT CHANGE UP
HCT VFR BLD CALC: 38.8 % — SIGNIFICANT CHANGE UP (ref 34.5–45)
HGB BLD-MCNC: 12.8 G/DL — SIGNIFICANT CHANGE UP (ref 11.5–15.5)
IMM GRANULOCYTES NFR BLD AUTO: 0.2 % — SIGNIFICANT CHANGE UP (ref 0–0.9)
KETONES UR-MCNC: NEGATIVE — SIGNIFICANT CHANGE UP
LEUKOCYTE ESTERASE UR-ACNC: NEGATIVE — SIGNIFICANT CHANGE UP
LIDOCAIN IGE QN: 138 U/L — SIGNIFICANT CHANGE UP (ref 73–393)
LYMPHOCYTES # BLD AUTO: 1.38 K/UL — SIGNIFICANT CHANGE UP (ref 1–3.3)
LYMPHOCYTES # BLD AUTO: 30.7 % — SIGNIFICANT CHANGE UP (ref 13–44)
MCHC RBC-ENTMCNC: 28.9 PG — SIGNIFICANT CHANGE UP (ref 27–34)
MCHC RBC-ENTMCNC: 33 GM/DL — SIGNIFICANT CHANGE UP (ref 32–36)
MCV RBC AUTO: 87.6 FL — SIGNIFICANT CHANGE UP (ref 80–100)
MONOCYTES # BLD AUTO: 0.27 K/UL — SIGNIFICANT CHANGE UP (ref 0–0.9)
MONOCYTES NFR BLD AUTO: 6 % — SIGNIFICANT CHANGE UP (ref 2–14)
NEUTROPHILS # BLD AUTO: 2.78 K/UL — SIGNIFICANT CHANGE UP (ref 1.8–7.4)
NEUTROPHILS NFR BLD AUTO: 61.7 % — SIGNIFICANT CHANGE UP (ref 43–77)
NITRITE UR-MCNC: NEGATIVE — SIGNIFICANT CHANGE UP
NRBC # BLD: 0 /100 WBCS — SIGNIFICANT CHANGE UP (ref 0–0)
PH UR: 8 — SIGNIFICANT CHANGE UP (ref 5–8)
PLATELET # BLD AUTO: 192 K/UL — SIGNIFICANT CHANGE UP (ref 150–400)
POTASSIUM SERPL-MCNC: 3.6 MMOL/L — SIGNIFICANT CHANGE UP (ref 3.5–5.3)
POTASSIUM SERPL-SCNC: 3.6 MMOL/L — SIGNIFICANT CHANGE UP (ref 3.5–5.3)
PROT SERPL-MCNC: 6.9 G/DL — SIGNIFICANT CHANGE UP (ref 6–8.3)
PROT UR-MCNC: NEGATIVE MG/DL — SIGNIFICANT CHANGE UP
RBC # BLD: 4.43 M/UL — SIGNIFICANT CHANGE UP (ref 3.8–5.2)
RBC # FLD: 12 % — SIGNIFICANT CHANGE UP (ref 10.3–14.5)
RSV RNA NPH QL NAA+NON-PROBE: SIGNIFICANT CHANGE UP
SARS-COV-2 RNA SPEC QL NAA+PROBE: SIGNIFICANT CHANGE UP
SODIUM SERPL-SCNC: 142 MMOL/L — SIGNIFICANT CHANGE UP (ref 135–145)
SP GR SPEC: 1.01 — SIGNIFICANT CHANGE UP (ref 1.01–1.02)
UROBILINOGEN FLD QL: NEGATIVE MG/DL — SIGNIFICANT CHANGE UP
WBC # BLD: 4.5 K/UL — SIGNIFICANT CHANGE UP (ref 3.8–10.5)
WBC # FLD AUTO: 4.5 K/UL — SIGNIFICANT CHANGE UP (ref 3.8–10.5)

## 2023-03-06 PROCEDURE — 96361 HYDRATE IV INFUSION ADD-ON: CPT

## 2023-03-06 PROCEDURE — 99284 EMERGENCY DEPT VISIT MOD MDM: CPT | Mod: 25

## 2023-03-06 PROCEDURE — 96375 TX/PRO/DX INJ NEW DRUG ADDON: CPT

## 2023-03-06 PROCEDURE — 87637 SARSCOV2&INF A&B&RSV AMP PRB: CPT

## 2023-03-06 PROCEDURE — 80053 COMPREHEN METABOLIC PANEL: CPT

## 2023-03-06 PROCEDURE — 81003 URINALYSIS AUTO W/O SCOPE: CPT

## 2023-03-06 PROCEDURE — 96374 THER/PROPH/DIAG INJ IV PUSH: CPT

## 2023-03-06 PROCEDURE — 36415 COLL VENOUS BLD VENIPUNCTURE: CPT

## 2023-03-06 PROCEDURE — 76700 US EXAM ABDOM COMPLETE: CPT | Mod: 26

## 2023-03-06 PROCEDURE — 99285 EMERGENCY DEPT VISIT HI MDM: CPT

## 2023-03-06 PROCEDURE — 76700 US EXAM ABDOM COMPLETE: CPT

## 2023-03-06 PROCEDURE — 85025 COMPLETE CBC W/AUTO DIFF WBC: CPT

## 2023-03-06 PROCEDURE — 81025 URINE PREGNANCY TEST: CPT

## 2023-03-06 PROCEDURE — 83690 ASSAY OF LIPASE: CPT

## 2023-03-06 RX ORDER — FAMOTIDINE 10 MG/ML
20 INJECTION INTRAVENOUS ONCE
Refills: 0 | Status: COMPLETED | OUTPATIENT
Start: 2023-03-06 | End: 2023-03-06

## 2023-03-06 RX ORDER — ONDANSETRON 8 MG/1
4 TABLET, FILM COATED ORAL ONCE
Refills: 0 | Status: COMPLETED | OUTPATIENT
Start: 2023-03-06 | End: 2023-03-06

## 2023-03-06 RX ORDER — LAMOTRIGINE 25 MG/1
1 TABLET, ORALLY DISINTEGRATING ORAL
Qty: 0 | Refills: 0 | DISCHARGE

## 2023-03-06 RX ORDER — ONDANSETRON 8 MG/1
1 TABLET, FILM COATED ORAL
Qty: 12 | Refills: 0
Start: 2023-03-06 | End: 2023-03-09

## 2023-03-06 RX ORDER — KETOROLAC TROMETHAMINE 30 MG/ML
30 SYRINGE (ML) INJECTION ONCE
Refills: 0 | Status: DISCONTINUED | OUTPATIENT
Start: 2023-03-06 | End: 2023-03-06

## 2023-03-06 RX ORDER — ESCITALOPRAM OXALATE 10 MG/1
2 TABLET, FILM COATED ORAL
Qty: 0 | Refills: 0 | DISCHARGE

## 2023-03-06 RX ORDER — MORPHINE SULFATE 50 MG/1
4 CAPSULE, EXTENDED RELEASE ORAL ONCE
Refills: 0 | Status: DISCONTINUED | OUTPATIENT
Start: 2023-03-06 | End: 2023-03-06

## 2023-03-06 RX ORDER — CLONAZEPAM 1 MG
1 TABLET ORAL
Qty: 0 | Refills: 0 | DISCHARGE

## 2023-03-06 RX ORDER — SODIUM CHLORIDE 9 MG/ML
1000 INJECTION INTRAMUSCULAR; INTRAVENOUS; SUBCUTANEOUS ONCE
Refills: 0 | Status: COMPLETED | OUTPATIENT
Start: 2023-03-06 | End: 2023-03-06

## 2023-03-06 RX ADMIN — Medication 30 MILLIGRAM(S): at 14:44

## 2023-03-06 RX ADMIN — ONDANSETRON 4 MILLIGRAM(S): 8 TABLET, FILM COATED ORAL at 14:44

## 2023-03-06 RX ADMIN — FAMOTIDINE 20 MILLIGRAM(S): 10 INJECTION INTRAVENOUS at 14:44

## 2023-03-06 RX ADMIN — SODIUM CHLORIDE 1000 MILLILITER(S): 9 INJECTION INTRAMUSCULAR; INTRAVENOUS; SUBCUTANEOUS at 14:45

## 2023-03-06 NOTE — ED PROVIDER NOTE - DIFFERENTIAL DIAGNOSIS
Differential including but not limited to gastritis esophagitis pancreatitis cholecystitis Differential Diagnosis

## 2023-03-06 NOTE — ED PROVIDER NOTE - CARE PROVIDER_API CALL
Braeden Schultz)  Gastroenterology; Internal Medicine  [address], [address]  [place] [address]  Phone: [phone]  Fax: [phone]  Follow Up Time: 1-3 Days    your doctor,   Phone: (   )    -  Fax: (   )    -  Follow Up Time: 1-3 Days

## 2023-03-06 NOTE — ED ADULT NURSE NOTE - AS SC BRADEN FRICTION
How Severe Is Your Acne?: mild
Is This A New Presentation, Or A Follow-Up?: Acne
(3) no apparent problem

## 2023-03-06 NOTE — ED PROVIDER NOTE - CLINICAL SUMMARY MEDICAL DECISION MAKING FREE TEXT BOX
Patient complaining of 3 days of epigastric/right upper quadrant abdominal pain radiating into chest and across upper abdomen worse when eating associated with nausea and diarrhea.  Patient relates she had norovirus a few weeks ago.  Patient denies fevers chills vomiting dysuria hematuria.  Patient does report urinary frequency  PMD [place]    Plan Labs including CBC CMP lipase pregnancy urine abdominal ultrasound IV fluid morphine Zofran pepcid  Differential including but not limited to gastritis esophagitis pancreatitis cholecystitis Patient complaining of 3 days of epigastric/right upper quadrant abdominal pain radiating into chest and across upper abdomen worse when eating associated with nausea and diarrhea.  Patient relates she had norovirus a few weeks ago.  Patient denies fevers chills vomiting dysuria hematuria.  Patient does report urinary frequency  PMD [facility]    Plan Labs including CBC CMP lipase pregnancy urine abdominal ultrasound IV fluid morphine Zofran pepcid  Differential including but not limited to gastritis esophagitis pancreatitis cholecystitis

## 2023-03-06 NOTE — ED PROVIDER NOTE - PATIENT PORTAL LINK FT
You can access the FollowMyHealth Patient Portal offered by [facility] by registering at the following website: [email or web address]. By joining [facility]’s FollowMyHealth portal, you will also be able to view your health information using other applications (apps) compatible with our system.

## 2023-03-06 NOTE — ED PROVIDER NOTE - PROVIDER TOKENS
PROVIDER:[TOKEN:[[id number]],FOLLOWUP:[1-3 Days]],FREE:[LAST:[your doctor],PHONE:[(   )    -],FAX:[(   )    -],FOLLOWUP:[1-3 Days]]

## 2023-03-06 NOTE — ED PROVIDER NOTE - PROGRESS NOTE DETAILS
Reevaluated patient at bedside.  Patient feeling much improved, declining CT.  Discussed the results of all diagnostic testing in ED and copies of all reports given.   An opportunity to ask questions was given.  Discussed the importance of prompt, close medical follow-up.  Patient will return with any changes, concerns or persistent / worsening symptoms.  Understanding of all instructions verbalized.

## 2023-03-06 NOTE — ED ADULT NURSE NOTE - OBJECTIVE STATEMENT
Patient reports abdominal pain with nausea and diarrhea for 3 days. Patient reports taking protonix and zantac at 5 am with no relief.

## 2023-03-06 NOTE — ED PROVIDER NOTE - NSFOLLOWUPINSTRUCTIONS_ED_ALL_ED_FT
start nexium otc daily    Abdominal Pain, Adult    Pain in the abdomen (abdominal pain) can be caused by many things. Often, abdominal pain is not serious and it gets better with no treatment or by being treated at home. However, sometimes abdominal pain is serious.    Your health care provider will ask questions about your medical history and do a physical exam to try to determine the cause of your abdominal pain.      Follow these instructions at home:    Medicines     •Take over-the-counter and prescription medicines only as told by your health care provider.      • Do not take a laxative unless told by your health care provider.        General instructions      •Watch your condition for any changes.      •Drink enough fluid to keep your urine pale yellow.      •Keep all follow-up visits as told by your health care provider. This is important.        Contact a health care provider if:    •Your abdominal pain changes or gets worse.      •You are not hungry or you lose weight without trying.      •You are constipated or have diarrhea for more than 2–3 days.      •You have pain when you urinate or have a bowel movement.      •Your abdominal pain wakes you up at night.      •Your pain gets worse with meals, after eating, or with certain foods.      •You are vomiting and cannot keep anything down.      •You have a fever.      •You have blood in your urine.        Get help right away if:    •Your pain does not go away as soon as your health care provider told you to expect.      •You cannot stop vomiting.      •Your pain is only in areas of the abdomen, such as the right side or the left lower portion of the abdomen. Pain on the right side could be caused by appendicitis.      •You have bloody or black stools, or stools that look like tar.      •You have severe pain, cramping, or bloating in your abdomen.    •You have signs of dehydration, such as:  •Dark urine, very little urine, or no urine.      •Cracked lips.      •Dry mouth.      •Sunken eyes.      •Sleepiness.      •Weakness.        •You have trouble breathing or chest pain.        Summary    •Often, abdominal pain is not serious and it gets better with no treatment or by being treated at home. However, sometimes abdominal pain is serious.      •Watch your condition for any changes.      •Take over-the-counter and prescription medicines only as told by your health care provider.      •Contact a health care provider if your abdominal pain changes or gets worse.      •Get help right away if you have severe pain, cramping, or bloating in your abdomen.      This information is not intended to replace advice given to you by your health care provider. Make sure you discuss any questions you have with your health care provider.

## 2023-03-06 NOTE — ED PROVIDER NOTE - OBJECTIVE STATEMENT
Patient complaining of 3 days of epigastric/right upper quadrant abdominal pain radiating into chest and across upper abdomen worse when eating associated with nausea and diarrhea.  Patient relates she had norovirus a few weeks ago.  Patient denies fevers chills vomiting dysuria hematuria.  Patient does report urinary frequency  PMD [place] Patient complaining of 3 days of epigastric/right upper quadrant abdominal pain radiating into chest and across upper abdomen worse when eating associated with nausea and diarrhea.  Patient relates she had norovirus a few weeks ago.  Patient denies fevers chills vomiting dysuria hematuria.  Patient does report urinary frequency  P[facility]

## 2023-05-02 NOTE — ED ADULT NURSE NOTE - NSFALLRSKASSESASSIST_ED_ALL_ED
no
What Type Of Note Output Would You Prefer (Optional)?: Bullet Format
Hpi Title: Evaluation of Skin Lesions
How Severe Are Your Spot(S)?: mild
Have Your Spot(S) Been Treated In The Past?: has not been treated

## 2023-05-23 NOTE — ED ADULT NURSE NOTE - NS ED NURSE REPORT GIVEN DT
Medical Necessity Information: It is in your best interest to select a reason for this procedure from the list below. All of these items fulfill various CMS LCD requirements except the new and changing color options. Medical Necessity Clause: This procedure was medically necessary because the lesion that was treated was: Lab: 6 Lab Facility: 0 Detail Level: Detailed Was A Bandage Applied: Yes Size Of Lesion In Cm (Required): 0.6 Depth Of Shave: dermis Biopsy Method: Dermablade Anesthesia Type: 1% lidocaine with epinephrine Hemostasis: Drysol Wound Care: Petrolatum Render Path Notes In Note?: No Consent was obtained from the patient. The risks and benefits to therapy were discussed in detail. Specifically, the risks of infection, scarring, bleeding, prolonged wound healing, incomplete removal, allergy to anesthesia, nerve injury and recurrence were addressed. Prior to the procedure, the treatment site was clearly identified and confirmed by the patient. All components of Universal Protocol/PAUSE Rule completed. Post-Care Instructions: I reviewed with the patient in detail post-care instructions. Patient is to keep the biopsy site dry overnight, and then apply bacitracin twice daily until healed. Patient may apply hydrogen peroxide soaks to remove any crusting. Notification Instructions: Patient will be notified of pathology results. However, patient instructed to call the office if not contacted within 2 weeks. Billing Type: Third-Party Bill 11-Dec-2019 11:25

## 2023-06-12 ENCOUNTER — APPOINTMENT (OUTPATIENT)
Dept: OBGYN | Facility: CLINIC | Age: 33
End: 2023-06-12

## 2023-07-06 ENCOUNTER — LABORATORY RESULT (OUTPATIENT)
Age: 33
End: 2023-07-06

## 2023-07-06 ENCOUNTER — APPOINTMENT (OUTPATIENT)
Dept: OBGYN | Facility: CLINIC | Age: 33
End: 2023-07-06
Payer: COMMERCIAL

## 2023-07-06 VITALS
DIASTOLIC BLOOD PRESSURE: 85 MMHG | SYSTOLIC BLOOD PRESSURE: 135 MMHG | WEIGHT: 108 LBS | HEIGHT: 62 IN | BODY MASS INDEX: 19.88 KG/M2

## 2023-07-06 DIAGNOSIS — R30.9 PAINFUL MICTURITION, UNSPECIFIED: ICD-10-CM

## 2023-07-06 DIAGNOSIS — Z98.82 BREAST IMPLANT STATUS: ICD-10-CM

## 2023-07-06 DIAGNOSIS — F42.9 OBSESSIVE-COMPULSIVE DISORDER, UNSPECIFIED: ICD-10-CM

## 2023-07-06 DIAGNOSIS — Z01.419 ENCOUNTER FOR GYNECOLOGICAL EXAMINATION (GENERAL) (ROUTINE) W/OUT ABNORMAL FINDINGS: ICD-10-CM

## 2023-07-06 DIAGNOSIS — Z11.3 ENCOUNTER FOR SCREENING FOR INFECTIONS WITH A PREDOMINANTLY SEXUAL MODE OF TRANSMISSION: ICD-10-CM

## 2023-07-06 LAB
BILIRUB UR QL STRIP: NORMAL
GLUCOSE UR-MCNC: NORMAL
HCG UR QL: 0.2 EU/DL
HGB UR QL STRIP.AUTO: NORMAL
KETONES UR-MCNC: NORMAL
LEUKOCYTE ESTERASE UR QL STRIP: NORMAL
NITRITE UR QL STRIP: NORMAL
PH UR STRIP: 7
PROT UR STRIP-MCNC: NORMAL
SP GR UR STRIP: 1.01

## 2023-07-06 PROCEDURE — 99395 PREV VISIT EST AGE 18-39: CPT

## 2023-07-06 PROCEDURE — 81003 URINALYSIS AUTO W/O SCOPE: CPT | Mod: QW

## 2023-07-06 PROCEDURE — 99215 OFFICE O/P EST HI 40 MIN: CPT | Mod: 25

## 2023-07-06 RX ORDER — NITROFURANTOIN (MONOHYDRATE/MACROCRYSTALS) 25; 75 MG/1; MG/1
100 CAPSULE ORAL TWICE DAILY
Qty: 10 | Refills: 0 | Status: ACTIVE | COMMUNITY
Start: 2023-07-06 | End: 1900-01-01

## 2023-07-06 NOTE — PHYSICAL EXAM
[Appropriately responsive] : appropriately responsive [Alert] : alert [No Acute Distress] : no acute distress [Soft] : soft [Non-tender] : non-tender [Oriented x3] : oriented x3 [Examination Of The Breasts] : a normal appearance [] : implants [No Discharge] : no discharge [___] : a [unfilled] ~Ucm area of induration [No Masses] : no breast masses were palpable [Labia Majora] : normal [Labia Minora] : normal [Discharge] : a  ~M vaginal discharge was present [Scant] : scant [White] : white [Thin] : thin [No Bleeding] : There was no active vaginal bleeding [Normal] : normal [Foul Smelling] : not foul smelling
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
## 2023-07-06 NOTE — PLAN
[FreeTextEntry1] : 32 year old, , presenting for annual exam.\par Also endorses vaginal burning with urination on occasion and some lower abdominal pain. \par \par 1.Well person exam\par -Medical/surgical/family history reviewed\par -Allergies and medications reconciled\par -Pap: obtained today\par -STI testing - offered/accepted\par - HPV vaccine- as managed by PCP\par -Reviewed breast awareness/calcium/vitamin D/weight bearing exercise\par \par 2. urinary complaints/vaginal Discharge\par - UA/Urine Culture obtained\par - GC/CT testing sent\par - Affirm obtained and sent\par - Prescription sent to preferred pharmacy as discussed\par - Discussed importance of continued condom use for STI prevention/pregnancy prevention\par - Will f/u once labs are resulted \par \par 3.Health Care Maintenance\par - Cholesterol/vaccinations per PCP\par - To follow up with GI for abdominal bloating as planned\par \par 4. Mental Health\par - continue current care   \par \par

## 2023-07-07 LAB
APPEARANCE: CLEAR
BILIRUBIN URINE: NEGATIVE
BLOOD URINE: NEGATIVE
C TRACH RRNA SPEC QL NAA+PROBE: NOT DETECTED
COLOR: YELLOW
GLUCOSE QUALITATIVE U: NEGATIVE MG/DL
HIV1+2 AB SPEC QL IA.RAPID: NONREACTIVE
HPV HIGH+LOW RISK DNA PNL CVX: NOT DETECTED
KETONES URINE: NEGATIVE MG/DL
LEUKOCYTE ESTERASE URINE: ABNORMAL
N GONORRHOEA RRNA SPEC QL NAA+PROBE: NOT DETECTED
NITRITE URINE: NEGATIVE
PH URINE: 7
PROTEIN URINE: NEGATIVE MG/DL
SOURCE AMPLIFICATION: NORMAL
SPECIFIC GRAVITY URINE: 1.01
T PALLIDUM AB SER QL IA: NEGATIVE
UROBILINOGEN URINE: 0.2 MG/DL

## 2023-07-10 LAB
CANDIDA VAG CYTO: NOT DETECTED
CYTOLOGY CVX/VAG DOC THIN PREP: NORMAL
G VAGINALIS+PREV SP MTYP VAG QL MICRO: NOT DETECTED
T VAGINALIS VAG QL WET PREP: NOT DETECTED

## 2023-07-11 ENCOUNTER — TRANSCRIPTION ENCOUNTER (OUTPATIENT)
Age: 33
End: 2023-07-11

## 2023-07-11 NOTE — ED ADULT TRIAGE NOTE - NS ED NOTE AC HIGH RISK COUNTRIES
Left msg for patient to call our direct line
Senaida Closs with scheduling contacted office to request new order for PAIGE's be placed with updated Dx codes if possible. Current order and codes are not meeting medical necessity requirements. Any questions please contact Senaida Closs @ [phone]. Thank you!
No

## 2023-08-19 NOTE — ED ADULT TRIAGE NOTE - HEIGHT IN INCHES
62 NAD. VSS. Afebrile. Neck supple. Lungs clear. ABD soft, non tender. No cva tender. Neuro- intact.

## 2023-10-04 ENCOUNTER — NON-APPOINTMENT (OUTPATIENT)
Age: 33
End: 2023-10-04

## 2024-01-04 ENCOUNTER — EMERGENCY (EMERGENCY)
Facility: HOSPITAL | Age: 34
LOS: 0 days | Discharge: HOME | End: 2024-01-04
Attending: EMERGENCY MEDICINE
Payer: MEDICAID

## 2024-01-04 VITALS
RESPIRATION RATE: 16 BRPM | HEART RATE: 58 BPM | SYSTOLIC BLOOD PRESSURE: 126 MMHG | TEMPERATURE: 98 F | OXYGEN SATURATION: 100 % | DIASTOLIC BLOOD PRESSURE: 88 MMHG

## 2024-01-04 VITALS — WEIGHT: 106.04 LBS | HEIGHT: 61 IN

## 2024-01-04 DIAGNOSIS — R11.2 NAUSEA WITH VOMITING, UNSPECIFIED: ICD-10-CM

## 2024-01-04 DIAGNOSIS — R10.817 GENERALIZED ABDOMINAL TENDERNESS: ICD-10-CM

## 2024-01-04 DIAGNOSIS — Z88.8 ALLERGY STATUS TO OTHER DRUGS, MEDICAMENTS AND BIOLOGICAL SUBSTANCES: ICD-10-CM

## 2024-01-04 DIAGNOSIS — F41.9 ANXIETY DISORDER, UNSPECIFIED: ICD-10-CM

## 2024-01-04 DIAGNOSIS — K59.00 CONSTIPATION, UNSPECIFIED: ICD-10-CM

## 2024-01-04 DIAGNOSIS — K58.9 IRRITABLE BOWEL SYNDROME WITHOUT DIARRHEA: ICD-10-CM

## 2024-01-04 DIAGNOSIS — Z86.2 PERSONAL HISTORY OF DISEASES OF THE BLOOD AND BLOOD-FORMING ORGANS AND CERTAIN DISORDERS INVOLVING THE IMMUNE MECHANISM: ICD-10-CM

## 2024-01-04 DIAGNOSIS — Z98.82 BREAST IMPLANT STATUS: Chronic | ICD-10-CM

## 2024-01-04 LAB
ALBUMIN SERPL ELPH-MCNC: 4.1 G/DL — SIGNIFICANT CHANGE UP (ref 3.3–5)
ALBUMIN SERPL ELPH-MCNC: 4.1 G/DL — SIGNIFICANT CHANGE UP (ref 3.3–5)
ALP SERPL-CCNC: 67 U/L — SIGNIFICANT CHANGE UP (ref 40–120)
ALP SERPL-CCNC: 67 U/L — SIGNIFICANT CHANGE UP (ref 40–120)
ALT FLD-CCNC: 28 U/L — SIGNIFICANT CHANGE UP (ref 12–78)
ALT FLD-CCNC: 28 U/L — SIGNIFICANT CHANGE UP (ref 12–78)
ANION GAP SERPL CALC-SCNC: 3 MMOL/L — LOW (ref 5–17)
ANION GAP SERPL CALC-SCNC: 3 MMOL/L — LOW (ref 5–17)
AST SERPL-CCNC: 20 U/L — SIGNIFICANT CHANGE UP (ref 15–37)
AST SERPL-CCNC: 20 U/L — SIGNIFICANT CHANGE UP (ref 15–37)
BASOPHILS # BLD AUTO: 0.03 K/UL — SIGNIFICANT CHANGE UP (ref 0–0.2)
BASOPHILS # BLD AUTO: 0.03 K/UL — SIGNIFICANT CHANGE UP (ref 0–0.2)
BASOPHILS NFR BLD AUTO: 0.5 % — SIGNIFICANT CHANGE UP (ref 0–2)
BASOPHILS NFR BLD AUTO: 0.5 % — SIGNIFICANT CHANGE UP (ref 0–2)
BILIRUB SERPL-MCNC: 0.3 MG/DL — SIGNIFICANT CHANGE UP (ref 0.2–1.2)
BILIRUB SERPL-MCNC: 0.3 MG/DL — SIGNIFICANT CHANGE UP (ref 0.2–1.2)
BUN SERPL-MCNC: 12 MG/DL — SIGNIFICANT CHANGE UP (ref 7–23)
BUN SERPL-MCNC: 12 MG/DL — SIGNIFICANT CHANGE UP (ref 7–23)
CALCIUM SERPL-MCNC: 9.4 MG/DL — SIGNIFICANT CHANGE UP (ref 8.5–10.1)
CALCIUM SERPL-MCNC: 9.4 MG/DL — SIGNIFICANT CHANGE UP (ref 8.5–10.1)
CHLORIDE SERPL-SCNC: 108 MMOL/L — SIGNIFICANT CHANGE UP (ref 96–108)
CHLORIDE SERPL-SCNC: 108 MMOL/L — SIGNIFICANT CHANGE UP (ref 96–108)
CO2 SERPL-SCNC: 30 MMOL/L — SIGNIFICANT CHANGE UP (ref 22–31)
CO2 SERPL-SCNC: 30 MMOL/L — SIGNIFICANT CHANGE UP (ref 22–31)
CREAT SERPL-MCNC: 0.97 MG/DL — SIGNIFICANT CHANGE UP (ref 0.5–1.3)
CREAT SERPL-MCNC: 0.97 MG/DL — SIGNIFICANT CHANGE UP (ref 0.5–1.3)
EGFR: 80 ML/MIN/1.73M2 — SIGNIFICANT CHANGE UP
EGFR: 80 ML/MIN/1.73M2 — SIGNIFICANT CHANGE UP
EOSINOPHIL # BLD AUTO: 0.03 K/UL — SIGNIFICANT CHANGE UP (ref 0–0.5)
EOSINOPHIL # BLD AUTO: 0.03 K/UL — SIGNIFICANT CHANGE UP (ref 0–0.5)
EOSINOPHIL NFR BLD AUTO: 0.5 % — SIGNIFICANT CHANGE UP (ref 0–6)
EOSINOPHIL NFR BLD AUTO: 0.5 % — SIGNIFICANT CHANGE UP (ref 0–6)
GLUCOSE SERPL-MCNC: 97 MG/DL — SIGNIFICANT CHANGE UP (ref 70–99)
GLUCOSE SERPL-MCNC: 97 MG/DL — SIGNIFICANT CHANGE UP (ref 70–99)
HCG SERPL-ACNC: <1 MIU/ML — SIGNIFICANT CHANGE UP
HCG SERPL-ACNC: <1 MIU/ML — SIGNIFICANT CHANGE UP
HCT VFR BLD CALC: 41 % — SIGNIFICANT CHANGE UP (ref 34.5–45)
HCT VFR BLD CALC: 41 % — SIGNIFICANT CHANGE UP (ref 34.5–45)
HGB BLD-MCNC: 13.8 G/DL — SIGNIFICANT CHANGE UP (ref 11.5–15.5)
HGB BLD-MCNC: 13.8 G/DL — SIGNIFICANT CHANGE UP (ref 11.5–15.5)
IMM GRANULOCYTES NFR BLD AUTO: 0.2 % — SIGNIFICANT CHANGE UP (ref 0–0.9)
IMM GRANULOCYTES NFR BLD AUTO: 0.2 % — SIGNIFICANT CHANGE UP (ref 0–0.9)
LIDOCAIN IGE QN: 47 U/L — SIGNIFICANT CHANGE UP (ref 13–75)
LIDOCAIN IGE QN: 47 U/L — SIGNIFICANT CHANGE UP (ref 13–75)
LYMPHOCYTES # BLD AUTO: 1.48 K/UL — SIGNIFICANT CHANGE UP (ref 1–3.3)
LYMPHOCYTES # BLD AUTO: 1.48 K/UL — SIGNIFICANT CHANGE UP (ref 1–3.3)
LYMPHOCYTES # BLD AUTO: 25.2 % — SIGNIFICANT CHANGE UP (ref 13–44)
LYMPHOCYTES # BLD AUTO: 25.2 % — SIGNIFICANT CHANGE UP (ref 13–44)
MCHC RBC-ENTMCNC: 29.9 PG — SIGNIFICANT CHANGE UP (ref 27–34)
MCHC RBC-ENTMCNC: 29.9 PG — SIGNIFICANT CHANGE UP (ref 27–34)
MCHC RBC-ENTMCNC: 33.7 GM/DL — SIGNIFICANT CHANGE UP (ref 32–36)
MCHC RBC-ENTMCNC: 33.7 GM/DL — SIGNIFICANT CHANGE UP (ref 32–36)
MCV RBC AUTO: 88.7 FL — SIGNIFICANT CHANGE UP (ref 80–100)
MCV RBC AUTO: 88.7 FL — SIGNIFICANT CHANGE UP (ref 80–100)
MONOCYTES # BLD AUTO: 0.37 K/UL — SIGNIFICANT CHANGE UP (ref 0–0.9)
MONOCYTES # BLD AUTO: 0.37 K/UL — SIGNIFICANT CHANGE UP (ref 0–0.9)
MONOCYTES NFR BLD AUTO: 6.3 % — SIGNIFICANT CHANGE UP (ref 2–14)
MONOCYTES NFR BLD AUTO: 6.3 % — SIGNIFICANT CHANGE UP (ref 2–14)
NEUTROPHILS # BLD AUTO: 3.95 K/UL — SIGNIFICANT CHANGE UP (ref 1.8–7.4)
NEUTROPHILS # BLD AUTO: 3.95 K/UL — SIGNIFICANT CHANGE UP (ref 1.8–7.4)
NEUTROPHILS NFR BLD AUTO: 67.3 % — SIGNIFICANT CHANGE UP (ref 43–77)
NEUTROPHILS NFR BLD AUTO: 67.3 % — SIGNIFICANT CHANGE UP (ref 43–77)
PLATELET # BLD AUTO: 176 K/UL — SIGNIFICANT CHANGE UP (ref 150–400)
PLATELET # BLD AUTO: 176 K/UL — SIGNIFICANT CHANGE UP (ref 150–400)
POTASSIUM SERPL-MCNC: 4.2 MMOL/L — SIGNIFICANT CHANGE UP (ref 3.5–5.3)
POTASSIUM SERPL-MCNC: 4.2 MMOL/L — SIGNIFICANT CHANGE UP (ref 3.5–5.3)
POTASSIUM SERPL-SCNC: 4.2 MMOL/L — SIGNIFICANT CHANGE UP (ref 3.5–5.3)
POTASSIUM SERPL-SCNC: 4.2 MMOL/L — SIGNIFICANT CHANGE UP (ref 3.5–5.3)
PROT SERPL-MCNC: 7 GM/DL — SIGNIFICANT CHANGE UP (ref 6–8.3)
PROT SERPL-MCNC: 7 GM/DL — SIGNIFICANT CHANGE UP (ref 6–8.3)
RBC # BLD: 4.62 M/UL — SIGNIFICANT CHANGE UP (ref 3.8–5.2)
RBC # BLD: 4.62 M/UL — SIGNIFICANT CHANGE UP (ref 3.8–5.2)
RBC # FLD: 11.6 % — SIGNIFICANT CHANGE UP (ref 10.3–14.5)
RBC # FLD: 11.6 % — SIGNIFICANT CHANGE UP (ref 10.3–14.5)
SODIUM SERPL-SCNC: 141 MMOL/L — SIGNIFICANT CHANGE UP (ref 135–145)
SODIUM SERPL-SCNC: 141 MMOL/L — SIGNIFICANT CHANGE UP (ref 135–145)
WBC # BLD: 5.87 K/UL — SIGNIFICANT CHANGE UP (ref 3.8–10.5)
WBC # BLD: 5.87 K/UL — SIGNIFICANT CHANGE UP (ref 3.8–10.5)
WBC # FLD AUTO: 5.87 K/UL — SIGNIFICANT CHANGE UP (ref 3.8–10.5)
WBC # FLD AUTO: 5.87 K/UL — SIGNIFICANT CHANGE UP (ref 3.8–10.5)

## 2024-01-04 PROCEDURE — 99285 EMERGENCY DEPT VISIT HI MDM: CPT

## 2024-01-04 PROCEDURE — 84702 CHORIONIC GONADOTROPIN TEST: CPT

## 2024-01-04 PROCEDURE — 85025 COMPLETE CBC W/AUTO DIFF WBC: CPT

## 2024-01-04 PROCEDURE — 83690 ASSAY OF LIPASE: CPT

## 2024-01-04 PROCEDURE — 36415 COLL VENOUS BLD VENIPUNCTURE: CPT

## 2024-01-04 PROCEDURE — 74177 CT ABD & PELVIS W/CONTRAST: CPT | Mod: MA

## 2024-01-04 PROCEDURE — 99284 EMERGENCY DEPT VISIT MOD MDM: CPT | Mod: 25

## 2024-01-04 PROCEDURE — 74177 CT ABD & PELVIS W/CONTRAST: CPT | Mod: 26,MA

## 2024-01-04 PROCEDURE — 80053 COMPREHEN METABOLIC PANEL: CPT

## 2024-01-04 PROCEDURE — 96374 THER/PROPH/DIAG INJ IV PUSH: CPT | Mod: XU

## 2024-01-04 RX ORDER — ACETAMINOPHEN 500 MG
1000 TABLET ORAL ONCE
Refills: 0 | Status: COMPLETED | OUTPATIENT
Start: 2024-01-04 | End: 2024-01-04

## 2024-01-04 RX ORDER — SODIUM CHLORIDE 9 MG/ML
1000 INJECTION INTRAMUSCULAR; INTRAVENOUS; SUBCUTANEOUS ONCE
Refills: 0 | Status: COMPLETED | OUTPATIENT
Start: 2024-01-04 | End: 2024-01-04

## 2024-01-04 RX ADMIN — SODIUM CHLORIDE 1000 MILLILITER(S): 9 INJECTION INTRAMUSCULAR; INTRAVENOUS; SUBCUTANEOUS at 18:33

## 2024-01-04 RX ADMIN — Medication 400 MILLIGRAM(S): at 18:32

## 2024-01-04 NOTE — ED STATDOCS - PATIENT PORTAL LINK FT
You can access the FollowMyHealth Patient Portal offered by [facility] by registering at the following website: [email or web address]. By joining [facility]’s FollowMyHealth portal, you will also be able to view your health information using other applications (apps) compatible with our system. You can access the FollowMyHealth Patient Portal offered by [facility] by registering at the following website: [email or web address]. By joining [employer]’s FollowMyHealth portal, you will also be able to view your health information using other applications (apps) compatible with our system.

## 2024-01-04 NOTE — ED STATDOCS - PROGRESS NOTE DETAILS
waiting on CT. care transitioned to evening team. ~Bandar James PA-C PA: patient is a 32 y/o female with PMHx of IBS, anemia, anxiety, eclampsia, anemia, who presents to ED c/o abdominal pain, constipation. Pt had 1 episode of vomiting. ~Bandar James PA-C Pt signed out to me by KASSY James. CT reviewed, no acute intraabdominal pathology noted. Discussed findings with patient. Has GI appt scheduled for Monday with Dr. Orourke. Advised to use Tylenol and Miralax as needed for pain and constipation. Patient verbally understands and agrees to plan. Strict return precautions were given. All questions and concerns were addressed. - Eileen Ayala PA-C

## 2024-01-04 NOTE — ED STATDOCS - NSICDXPASTMEDICALHX_GEN_ALL_CORE_FT
PAST MEDICAL HISTORY:  Anemia     Anxiety     Anxiety     Eclampsia     History of IBS     Obsessive-compulsive disorder, unspecified type

## 2024-01-04 NOTE — ED STATDOCS - NSFOLLOWUPINSTRUCTIONS_ED_ALL_ED_FT
Follow up with gastroenterologist. Take Tylenol 650-1000 mg every 6 hours as needed for pain. Do not exceed 4,000 mg in a 24 hour period. Use Miralax over the counter for constipation.  Return to the Emergency Department for worsening or persistent symptoms, and/or ANY NEW OR CONCERNING SYMPTOMS. If you have issues obtaining follow up, please call: [phone] or [phone]  to obtain a doctor or specialist who takes your insurance in your area.     Acute Abdominal Pain    WHAT YOU NEED TO KNOW:    The cause of your abdominal pain may not be found. If a cause is found, treatment will depend on what the cause is.     DISCHARGE INSTRUCTIONS:    Return to the emergency department if:     You vomit blood or cannot stop vomiting.      You have blood in your bowel movement or it looks like tar.       You have bleeding from your rectum.       Your abdomen is larger than usual, more painful, and hard.       You have severe pain in your abdomen.       You stop passing gas and having bowel movements.       You feel weak, dizzy, or faint.    Contact your healthcare provider if:     You have a fever.      You have new signs and symptoms.      Your symptoms do not get better with treatment.       You have questions or concerns about your condition or care.    Medicines may be given to decrease pain, treat an infection, and manage your symptoms. Take your medicine as directed. Call your healthcare provider if you think your medicine is not helping or if you have side effects. Tell him if you are allergic to any medicine. Keep a list of the medicines, vitamins, and herbs you take. Include the amounts, and when and why you take them. Bring the list or the pill bottles to follow-up visits. Carry your medicine list with you in case of an emergency.    Manage your symptoms:     Apply heat on your abdomen for 20 to 30 minutes every 2 hours for as many days as directed. Heat helps decrease pain and muscle spasms.       Manage your stress. Stress may cause abdominal pain. Your healthcare provider may recommend relaxation techniques and deep breathing exercises to help decrease your stress. Your healthcare provider may recommend you talk to someone about your stress or anxiety, such as a counselor or a trusted friend. Get plenty of sleep and exercise regularly.       Limit or do not drink alcohol. Alcohol can make your abdominal pain worse. Ask your healthcare provider if it is safe for you to drink alcohol. Also ask how much is safe for you to drink.       Do not smoke. Nicotine and other chemicals in cigarettes can damage your esophagus and stomach. Ask your healthcare provider for information if you currently smoke and need help to quit. E-cigarettes or smokeless tobacco still contain nicotine. Talk to your healthcare provider before you use these products.     Make changes to the food you eat as directed: Do not eat foods that cause abdominal pain or other symptoms. Eat small meals more often.     Eat more high-fiber foods if you are constipated. High-fiber foods include fruits, vegetables, whole-grain foods, and legumes.       Do not eat foods that cause gas if you have bloating. Examples include broccoli, cabbage, and cauliflower. Do not drink soda or carbonated drinks, because these may also cause gas.       Do not eat foods or drinks that contain sorbitol or fructose if you have diarrhea and bloating. Some examples are fruit juices, candy, jelly, and sugar-free gum.       Do not eat high-fat foods, such as fried foods, cheeseburgers, hot dogs, and desserts.      Limit or do not drink caffeine. Caffeine may make symptoms, such as heart burn or nausea, worse.       Drink plenty of liquids to prevent dehydration from diarrhea or vomiting. Ask your healthcare provider how much liquid to drink each day and which liquids are best for you.     Follow up with your healthcare provider as directed: Write down your questions so you remember to ask them during your visits. Follow up with gastroenterologist. Take Tylenol 650-1000 mg every 6 hours as needed for pain. Do not exceed 4,000 mg in a 24 hour period. Use Miralax over the counter for constipation.  Return to the Emergency Department for worsening or persistent symptoms, and/or ANY NEW OR CONCERNING SYMPTOMS. If you have issues obtaining follow up, please call: [phone] or [phone]  to obtain a doctor or specialist who takes your insurance in your area.     Acute Abdominal Pain    WHAT YOU NEED TO KNOW:    The cause of your abdominal pain may not be found. If a cause is found, treatment will depend on what the cause is.     DISCHARGE INSTRUCTIONS:    Return to the emergency department if:     You vomit blood or cannot stop vomiting.      You have blood in your bowel movement or it looks like tar.       You have bleeding from your rectum.       Your abdomen is larger than usual, more painful, and hard.       You have severe pain in your abdomen.       You stop passing gas and having bowel movements.       You feel weak, dizzy, or faint.    Contact your healthcare provider if:     You have a fever.      You have new signs and symptoms.      Your symptoms do not get better with treatment.       You have questions or concerns about your condition or care.    Medicines may be given to decrease pain, treat an infection, and manage your symptoms. Take your medicine as directed. Call your healthcare provider if you think your medicine is not helping or if you have side effects. Tell him if you are allergic to any medicine. Keep a list of the medicines, vitamins, and herbs you take. Include the amounts, and when and why you take them. Bring the list or the pill bottles to follow-up visits. Carry your medicine list with you in case of an emergency.    Manage your symptoms:     Apply heat on your abdomen for 20 to 30 minutes every 2 hours for as many days as directed. Heat helps decrease pain and muscle spasms.       Manage your stress. Stress may cause abdominal pain. Your healthcare provider may recommend relaxation techniques and deep breathing exercises to help decrease your stress. Your healthcare provider may recommend you talk to someone about your stress or anxiety, such as a counselor or a trusted friend. Get plenty of sleep and exercise regularly.       Limit or do not drink alcohol. Alcohol can make your abdominal pain worse. Ask your healthcare provider if it is safe for you to drink alcohol. Also ask how much is safe for you to drink.       Do not smoke. Nicotine and other chemicals in cigarettes can damage your esophagus and stomach. Ask your healthcare provider for information if you currently smoke and need help to quit. E-cigarettes or smokeless tobacco still contain nicotine. Talk to your healthcare provider before you use these products.     Make changes to the food you eat as directed: Do not eat foods that cause abdominal pain or other symptoms. Eat small meals more often.     Eat more high-fiber foods if you are constipated. High-fiber foods include fruits, vegetables, whole-grain foods, and legumes.       Do not eat foods that cause gas if you have bloating. Examples include broccoli, cabbage, and cauliflower. Do not drink soda or carbonated drinks, because these may also cause gas.       Do not eat foods or drinks that contain sorbitol or fructose if you have diarrhea and bloating. Some examples are fruit juices, candy, jelly, and sugar-free gum.       Do not eat high-fat foods, such as fried foods, cheeseburgers, hot dogs, and desserts.      Limit or do not drink caffeine. Caffeine may make symptoms, such as heart burn or nausea, worse.       Drink plenty of liquids to prevent dehydration from diarrhea or vomiting. Ask your healthcare provider how much liquid to drink each day and which liquids are best for you.     Follow up with your healthcare provider as directed: Write down your questions so you remember to ask them during your visits.

## 2024-01-04 NOTE — ED ADULT TRIAGE NOTE - CHIEF COMPLAINT QUOTE
patient c/o abdominal pain.  reports constipation x 5 days with lower abdominal bloating.  endorsing umbilical pain since yesterday.  one episode of vomiting today.

## 2024-01-04 NOTE — ED STATDOCS - PHYSICAL EXAMINATION
Gen:  Well appearing in NAD  Head:  NC/AT  HEENT: pupils perrl,no pharyngeal erythema, uvula midline  Cardiac: S1S2, RRR  Abd: Soft, generalized ttp  Resp: No distress, CTA   musculoskeletal:: no deformities, no swelling, strength +5/+5  Skin: warm and dry as visualized, no rashes  Neuro: LOU, aao x 4  Psych:alert, cooperative, appropriate mood and affect for situation Attending: Gen:  Well appearing in NAD  Head:  NC/AT  HEENT: pupils perrl,no pharyngeal erythema, uvula midline  Cardiac: S1S2, RRR  Abd: Soft, generalized ttp  Resp: No distress, CTA   musculoskeletal:: no deformities, no swelling, strength +5/+5  Skin: warm and dry as visualized, no rashes  Neuro: LOU, aao x 4  Psych:alert, cooperative, appropriate mood and affect for situation

## 2024-01-04 NOTE — ED STATDOCS - ATTENDING APP SHARED VISIT CONTRIBUTION OF CARE
Dr. Zarate: I performed a face to face bedside interview with patient regarding history of present illness, review of symptoms and past medical history. I completed an independent physical exam.  I have discussed patient's plan of care with PA.   I agree with note as stated above, having amended the EMR as needed to reflect my findings.   This includes HISTORY OF PRESENT ILLNESS, HIV, PAST MEDICAL/SURGICAL/FAMILY/SOCIAL HISTORY, ALLERGIES AND HOME MEDICATIONS, REVIEW OF SYSTEMS, PHYSICAL EXAM, and any PROGRESS NOTES during the time I functioned as the attending physician for this patient.

## 2024-01-04 NOTE — ED STATDOCS - GASTROINTESTINAL, MLM
abdomen soft, +Mild diffuse tenderness. No rebound, no guarding. No CVAT. Bowel sounds present. NEG Durán. NEG McBurney's.

## 2024-01-04 NOTE — ED STATDOCS - NS_ATTENDINGSCRIBE_ED_ALL_ED
stretcher
I personally performed the service described in the documentation recorded by the scribe in my presence, and it accurately and completely records my words and actions.

## 2024-01-04 NOTE — ED STATDOCS - OBJECTIVE STATEMENT
34 y/o female presents to the ED c/o abdominal pain, constipation reflux sensation as well as calf cramping. Pt reports staying hydrated, has had 2 bottles of water today. Pt has had 1 episode of vomiting. Pt denies past abdominal surgeries

## 2024-01-10 NOTE — ASU PATIENT PROFILE, ADULT - PSH
Problem: Psychosocial Needs  Goal: Collaborate with family/caregiver to identify patient specific goals for this hospitalization  Outcome: Progressing     Problem: Respiratory - [place]  Goal: Respiratory Rate 30-60 with no apnea, bradycardia, cyanosis or desaturations  Outcome: Progressing     Savannah remains stable in an open crib in room air with no A/B/Ds so far this shift. Girth remains stable and continues to tolerate feeds of Enfacare 22 PO ad ang Q3. No contact from family this shift. Will continue to monitor and support.     H/O breast augmentation

## 2024-01-15 NOTE — PROGRESS NOTE BEHAVIORAL HEALTH - ORIENTED TO PERSON
Collect stool card and turn into our office. After collected, stop taking the zantac and take omeprazole 20 mg every morning 30 min before eating.    You can also take a chewable TUMS (but not with omeprazole) as needed 1-2 times a day.    Follow up for a well check up in 2-3 weeks at which time we will check on your stomach aches again.  
Yes
Yes

## 2024-01-26 ENCOUNTER — NON-APPOINTMENT (OUTPATIENT)
Age: 34
End: 2024-01-26

## 2024-02-21 ENCOUNTER — NON-APPOINTMENT (OUTPATIENT)
Age: 34
End: 2024-02-21

## 2024-05-29 PROBLEM — Z87.19 PERSONAL HISTORY OF OTHER DISEASES OF THE DIGESTIVE SYSTEM: Chronic | Status: ACTIVE | Noted: 2024-01-04

## 2024-06-10 ENCOUNTER — APPOINTMENT (OUTPATIENT)
Dept: ORTHOPEDIC SURGERY | Facility: CLINIC | Age: 34
End: 2024-06-10

## 2024-08-08 NOTE — ED ADULT NURSE NOTE - NS ED NURSE LEVEL OF CONSCIOUSNESS AFFECT
palpable   Abd: soft lax, no tenderness, no organomegaly, audible bowel sounds.   Skin: warm, no lesions, no rash. No callus, no Ulcers, No acanthosis nigricans   Neuro: CN intact, sensation normal bilateral , muscle power normal  Psych: normal mood, and affect      Review of Laboratory Data:  I have reviewed the following:  Lab Results   Component Value Date/Time    WBC 17.7 (H) 07/30/2022 10:16 AM    RBC 5.35 07/30/2022 10:16 AM    HGB 16.6 (H) 07/30/2022 10:16 AM    HCT 46.8 07/30/2022 10:16 AM    MCV 87.5 07/30/2022 10:16 AM    MCH 31.0 07/30/2022 10:16 AM    MCHC 35.5 (H) 07/30/2022 10:16 AM    RDW 12.0 07/30/2022 10:16 AM     07/30/2022 10:16 AM    MPV 10.8 07/30/2022 10:16 AM      Lab Results   Component Value Date/Time     07/30/2022 10:16 AM    K 3.8 07/30/2022 10:16 AM    CO2 19 (L) 07/30/2022 10:16 AM    BUN 15 07/30/2022 10:16 AM    CALCIUM 9.2 07/30/2022 10:16 AM      No results found for: \"TSH\", \"T4FREE\", \"FT3\", \"T5EKRUZ\", \"TSI\", \"TPOABS\"  Lab Results   Component Value Date/Time    LABA1C 7.1 08/08/2024 03:07 PM    GLUCOSE 296 07/30/2022 10:16 AM    MALBCR - 10/03/2022 02:37 PM     Lab Results   Component Value Date/Time    CHOL 168 10/03/2022 02:37 PM    CHOL 204 10/04/2018 12:00 PM    TRIG 35 10/03/2022 02:37 PM    TRIG 143 10/04/2018 12:00 PM    HDL 60 10/03/2022 02:37 PM    HDL 48 10/04/2018 12:00 PM     Lab Results   Component Value Date/Time    VITD25 17 10/03/2022 02:37 PM       Medical Records/Labs/Images review:   I personally reviewed and summarized previous records   All labs were reviewed independently     ASSESSMENT & RECOMMENDATIONS   Dre AVE Nuñez, a 39 y.o.-old male seen in for the following issues     Diabetes Mellitus Type 1   Patient's diabetes is 8.8% -> 11.7% -> 7.1% since starting the pump  Adjust pump settings:  Basal  0.8, CR 11, ISF  46, -150, AIT  2  Advised to check blood sugars 4 times a day before meals and at bedtime   Discussed with patient A1c and  Calm

## 2025-02-16 NOTE — ED PROVIDER NOTE - CONSTITUTIONAL NOURISHMENT, MLM
Patient Name: Blanca Harden  : 1945    MRN: [id number]                              Today's Date: 2025       Admit Date: 2/15/2025    Visit Dx:     ICD-10-CM ICD-9-CM   1. Difficulty with speech  R47.9 784.59   2. Right leg weakness  R29.898 729.89   3. Dizziness  R42 780.4   4. Uncontrolled hypertension  I10 401.9   5. Chronic renal impairment, unspecified CKD stage  N18.9 585.9   6. History of diabetes mellitus  Z86.39 V12.29     Patient Active Problem List   Diagnosis    Osteoporosis    Breast neoplasm, Tis (DCIS), left    Iron deficiency anemia    CKD (chronic kidney disease)    Diabetic nephropathy associated with type 2 diabetes mellitus    Temporal arteritis    Immunosuppression    Anemia    Duodenal adenoma    Gastritis without bleeding    Polyp of duodenum    Morbidly obese    Dyspnea on exertion    Hyperlipidemia    Type 2 diabetes mellitus with stage 3b chronic kidney disease, with long-term current use of insulin    Essential hypertension    Bilateral lower extremity edema    Pulmonary hypertension    Obstructive sleep apnea on CPAP    Stage 3a chronic kidney disease    Iron malabsorption    Respiratory distress    Hypoglycemia due to insulin    Delirium    Hypomagnesemia    Severe malnutrition    Leg swelling    Venous (peripheral) insufficiency    Polyp of colon    Obesity (BMI 30.0-34.9)    TIA (transient ischemic attack)    Difficulty with speech     Past Medical History:   Diagnosis Date    Anemia     Anxiety and depression     Asthma     Balance problem     CKD (chronic kidney disease), stage III     Colonic mass     COPD (chronic obstructive pulmonary disease)     Coronary artery disease     FOLLOWED BY DR GUNN    Diabetes mellitus, type 2     History of COVID-19     History of left breast cancer 2019    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 Daniel Gutierrez, [facility]    CARDIAC CATHETERIZATION       SECTION N/A     x3    CHOLECYSTECTOMY N/A     COLON RESECTION Right 2025    Procedure: COLON RESECTION LAPAROSCOPIC RIGHT WITH DAVINCI ROBOT;  Surgeon: Ashwin Alvarado MD;  Location: [facility] MAIN OR;  Service: Robotics - DaVinci;  Laterality: Right;    COLONOSCOPY      COLONOSCOPY N/A 2024    Procedure: COLONOSCOPY into the cecum and TI with hot snare polypectomy;  Surgeon: Ashwin Alvarado MD;  Location: [facility] ENDOSCOPY;  Service: General;  Laterality: N/A;  pre-colon polyps  post-colon mass    COLONOSCOPY W/ POLYPECTOMY N/A 2019    Enlarged folds in the antrum: biopsied; duodenal, transverse colon x2, splenic flexure, descending colon and sigmoid colon polyps: biopsied (path: tubular adenoma x6)-Dr. Zak De Jesus, [facility]    ENDOSCOPY  10/11/2019    Procedure: ESOPHAGOGASTRODUODENOSCOPY with hot snare polypectomy;  Surgeon: Haile Handley MD;  Location: [facility] ENDOSCOPY;  Service: Gastroenterology    ENDOSCOPY N/A 2020    Procedure: ESOPHAGOGASTRODUODENOSCOPY;  Surgeon: Haile Handley MD;  Location: [facility] ENDOSCOPY;  Service: Gastroenterology    ENDOSCOPY N/A 2020    Procedure: ESOPHAGOGASTRODUODENOSCOPY WITH BIOPSIES AND COLD BIOPSY POLYPECTOMY;  Surgeon: Haile Handley MD;  Location: [facility] ENDOSCOPY;  Service: Gastroenterology;  Laterality: N/A;  pre: dyspepsia and  diarrhea  post: duodenal polyp and gastritis    ENDOSCOPY N/A 07/23/2024    Procedure: ESOPHAGOGASTRODUODENOSCOPY WITH hot snare polypectomy with clip placement x 2BIOPSY;  Surgeon: Ashwin Alvarado MD;  Location: [facility] ENDOSCOPY;  Service: General;  Laterality: N/A;  pre-hx duoedenal polyp  post-duodenal polyp; gastritis    EYE SURGERY      MASTECTOMY WITH SENTINEL NODE BIOPSY AND AXILLARY NODE DISSECTION Left 07/03/2019    Procedure: LEFT BREAST MASTECTOMY WITH SENTINEL NODE BIOPSY AND , v-y plasty closure;  Surgeon: Tahmina James MD;  Location: [facility] MAIN OR;  Service: General    SUBTOTAL HYSTERECTOMY Bilateral     ovaries still in tact    TEMPORAL ARTERY BIOPSY Bilateral 12/05/2019      General Information       Row Name 02/16/25 1420          Physical Therapy Time and Intention    Document Type evaluation  -     Mode of Treatment individual therapy;physical therapy  -       Row Name 02/16/25 1420          General Information    Patient Profile Reviewed yes  -CC     Prior Level of Function independent:  -     Existing Precautions/Restrictions fall  -     Barriers to Rehab language barrier  -CC       Row Name 02/16/25 1420          Living Environment    People in Home spouse  -CC       Row Name 02/16/25 1420          Safety Issues/Impairments Affecting Functional Mobility    Impairments Affecting Function (Mobility) balance;endurance/activity tolerance;strength  -CC               User Key  (r) = Recorded By, (t) = Taken By, (c) = Cosigned By      Initials Name Provider Type    CC Gwen Morales PT Physical Therapist                   Mobility       Row Name 02/16/25 1421          Bed Mobility    Bed Mobility bed mobility (all) activities  -     All Activities, [place] (Bed Mobility) minimum assist (75% patient effort)  -CC       Row Name 02/16/25 1421          Sit-Stand Transfer    Sit-Stand [place] (Transfers) contact guard  -     Assistive Device (Sit-Stand Transfers)  walker, front-wheeled  -CC       Row Name 02/16/25 1421          Gait/Stairs (Locomotion)    [place] Level (Gait) contact guard  -CC     Assistive Device (Gait) walker, front-wheeled  -CC     Patient was able to Ambulate yes  -CC     Distance in Feet (Gait) 30  -CC               User Key  (r) = Recorded By, (t) = Taken By, (c) = Cosigned By      Initials Name Provider Type    Gwen Elizabeth PT Physical Therapist                   Obj/Interventions       Row Name 02/16/25 1421          Strength Comprehensive (MMT)    Comment, General Manual Muscle Testing (MMT) Assessment B LE grossly 4/5  -CC               User Key  (r) = Recorded By, (t) = Taken By, (c) = Cosigned By      Initials Name Provider Type    Gwen Elizabeth PT Physical Therapist                   Goals/Plan       Row Name 02/16/25 1423          Bed Mobility Goal 1 (PT)    Activity/Assistive Device (Bed Mobility Goal 1, PT) bed mobility activities, all  -CC     [place] Level/Cues Needed (Bed Mobility Goal 1, PT) standby assist  -CC     Time Frame (Bed Mobility Goal 1, PT) by discharge  -[facility] Name 02/16/25 1423          Transfer Goal 1 (PT)    Activity/Assistive Device (Transfer Goal 1, PT) transfers, all  -CC     [place] Level/Cues Needed (Transfer Goal 1, PT) standby assist  -CC     Time Frame (Transfer Goal 1, PT) by discharge  -[facility] Name 02/16/25 1423          Gait Training Goal 1 (PT)    Activity/Assistive Device (Gait Training Goal 1, PT) gait (walking locomotion);assistive device use  -CC     [place] Level (Gait Training Goal 1, PT) standby assist  -CC     Distance (Gait Training Goal 1, PT) 100  -CC     Time Frame (Gait Training Goal 1, PT) by discharge  -               User Key  (r) = Recorded By, (t) = Taken By, (c) = Cosigned By      Initials Name Provider Type    Gwen Elizabeth PT Physical Therapist                   Clinical Impression       Row Name 02/16/25 1421          Plan  of Care Review    Outcome Evaluation Pt is a 79 y.o. female admitted to [facility] with c/o AMS, difficulty speaking, and R Sided weakness on 2/15/2025. Work up reveals TIA. Pt reports symptoms improved since admission. Pt presents today with generalized weakness, and decreased functional mobility. Pt required min A for bed mobility, CGA for STS transfers, and CGA for ambulation with FWW for 30 ft. Pt will benefit from skilled PT to address the previous deficits and improve overall safety with functional mobility. Prior to admission pt lives with spouse at home and uses rollator for ambulation. Will continue to assess for appropriate d/c disposition IRF vs. Home with assist and HH PT pending progress.  -CC       Row Name 02/16/25 1421          Therapy Assessment/Plan (PT)    Rehab Potential (PT) good  -CC     Therapy Frequency (PT) 6 times/wk  -CC       Row Name 02/16/25 1421          Positioning and Restraints    Pre-Treatment Position in bed  -CC     Post Treatment Position bed  -CC     In Bed supine;call light within reach;encouraged to call for assist;exit alarm on;with family/caregiver  -CC               User Key  (r) = Recorded By, (t) = Taken By, (c) = Cosigned By      Initials Name Provider Type    Gwen Elizaebth, PT Physical Therapist                   Outcome Measures       Row Name 02/16/25 1423 02/16/25 0946       How much help from another person do you currently need...    Turning from your back to your side while in flat bed without using bedrails? 3  -CC 3  -PC    Moving from lying on back to sitting on the side of a flat bed without bedrails? 3  -CC 3  -PC    Moving to and from a bed to a chair (including a wheelchair)? 3  -CC 2  -PC    Standing up from a chair using your arms (e.g., wheelchair, bedside chair)? 3  -CC 2  -PC    Climbing 3-5 steps with a railing? 1  -CC 1  -PC    To walk in hospital room? 3  -CC 2  -PC    AM-PAC 6 Clicks Score (PT) 16  -CC 13  -PC    Highest Level of Mobility Goal  5 --> Static standing  -CC 4 --> Transfer to chair/commode  -PC      Row Name 02/16/25 1423          Functional Assessment    Outcome Measure Options AM-PAC 6 Clicks Basic Mobility (PT)  -CC               User Key  (r) = Recorded By, (t) = Taken By, (c) = Cosigned By      Initials Name Provider Type    CC Gwen Morales, PT Physical Therapist    PC Prashant Cano, RN Registered Nurse                                 Physical Therapy Education        No education to display                  PT Recommendation and Plan     Outcome Evaluation: Pt is a 79 y.o. female admitted to [facility] with c/o AMS, difficulty speaking, and R Sided weakness on 2/15/2025. Work up reveals TIA. Pt reports symptoms improved since admission. Pt presents today with generalized weakness, and decreased functional mobility. Pt required min A for bed mobility, CGA for STS transfers, and CGA for ambulation with FWW for 30 ft. Pt will benefit from skilled PT to address the previous deficits and improve overall safety with functional mobility. Prior to admission pt lives with spouse at home and uses rollator for ambulation. Will continue to assess for appropriate d/c disposition IRF vs. Home with assist and  PT pending progress.     Time Calculation:         PT Charges       Row Name 02/16/25 1423             Time Calculation    Start Time 1347  -CC      Stop Time 1404  -CC      Time Calculation (min) 17 min  -CC      PT Received On 02/16/25  -CC      PT - Next Appointment 02/17/25  -CC      PT Goal Re-Cert Due Date 02/23/25  -CC         Time Calculation- PT    Total Timed Code Minutes- PT 10 minute(s)  -CC         Timed Charges    52976 - PT Therapeutic Activity Minutes 10  -CC         Untimed Charges    PT Eval/Re-eval Minutes 7  -CC         Total Minutes    Timed Charges Total Minutes 10  -CC      Untimed Charges Total Minutes 7  -CC       Total Minutes 17  -CC                User Key  (r) = Recorded By, (t) = Taken By, (c) = Cosigned By       Initials Name Provider Type    CC Gwen Morales, PT Physical Therapist                  Therapy Charges for Today       Code Description Service Date Service Provider Modifiers Qty    19491692828 HC PT EVAL MOD COMPLEXITY 2 2/16/2025 Gwen Morales, PT GP 1    24909720247 HC PT THERAPEUTIC ACT EA 15 MIN 2/16/2025 Gwen Morales, PT GP 1            PT G-Codes  Outcome Measure Options: AM-PAC 6 Clicks Basic Mobility (PT)  AM-PAC 6 Clicks Score (PT): 16  PT Discharge Summary  Anticipated Discharge Disposition (PT): inpatient rehabilitation facility, home with home health, home with assist    Gwen Morales, TREY  2/16/2025     well

## 2025-03-15 ENCOUNTER — EMERGENCY (EMERGENCY)
Facility: HOSPITAL | Age: 35
LOS: 1 days | Discharge: HOME | End: 2025-03-15
Attending: EMERGENCY MEDICINE | Admitting: EMERGENCY MEDICINE
Payer: MEDICAID

## 2025-03-15 VITALS
TEMPERATURE: 98 F | SYSTOLIC BLOOD PRESSURE: 150 MMHG | HEART RATE: 122 BPM | HEIGHT: 61 IN | RESPIRATION RATE: 14 BRPM | WEIGHT: 100.09 LBS | DIASTOLIC BLOOD PRESSURE: 83 MMHG | OXYGEN SATURATION: 99 %

## 2025-03-15 DIAGNOSIS — Z98.82 BREAST IMPLANT STATUS: Chronic | ICD-10-CM

## 2025-03-15 DIAGNOSIS — F43.23 ADJUSTMENT DISORDER WITH MIXED ANXIETY AND DEPRESSED MOOD: ICD-10-CM

## 2025-03-15 LAB
ALBUMIN SERPL ELPH-MCNC: 4.4 G/DL — SIGNIFICANT CHANGE UP (ref 3.3–5)
ALP SERPL-CCNC: 66 U/L — SIGNIFICANT CHANGE UP (ref 30–120)
ALT FLD-CCNC: 24 U/L — SIGNIFICANT CHANGE UP (ref 10–60)
ANION GAP SERPL CALC-SCNC: 10 MMOL/L — SIGNIFICANT CHANGE UP (ref 5–17)
APAP SERPL-MCNC: <1 UG/ML — LOW (ref 10–30)
APPEARANCE UR: CLEAR — SIGNIFICANT CHANGE UP
AST SERPL-CCNC: 14 U/L — SIGNIFICANT CHANGE UP (ref 10–40)
BASOPHILS # BLD AUTO: 0.01 K/UL — SIGNIFICANT CHANGE UP (ref 0–0.2)
BASOPHILS NFR BLD AUTO: 0.2 % — SIGNIFICANT CHANGE UP (ref 0–2)
BILIRUB SERPL-MCNC: 0.6 MG/DL — SIGNIFICANT CHANGE UP (ref 0.2–1.2)
BILIRUB UR-MCNC: NEGATIVE — SIGNIFICANT CHANGE UP
BUN SERPL-MCNC: 14 MG/DL — SIGNIFICANT CHANGE UP (ref 7–23)
CALCIUM SERPL-MCNC: 9.3 MG/DL — SIGNIFICANT CHANGE UP (ref 8.4–10.5)
CHLORIDE SERPL-SCNC: 104 MMOL/L — SIGNIFICANT CHANGE UP (ref 96–108)
CO2 SERPL-SCNC: 26 MMOL/L — SIGNIFICANT CHANGE UP (ref 22–31)
COLOR SPEC: YELLOW — SIGNIFICANT CHANGE UP
CREAT SERPL-MCNC: 1.05 MG/DL — SIGNIFICANT CHANGE UP (ref 0.5–1.3)
DIFF PNL FLD: NEGATIVE — SIGNIFICANT CHANGE UP
EGFR: 72 ML/MIN/1.73M2 — SIGNIFICANT CHANGE UP
EGFR: 72 ML/MIN/1.73M2 — SIGNIFICANT CHANGE UP
EOSINOPHIL # BLD AUTO: 0 K/UL — SIGNIFICANT CHANGE UP (ref 0–0.5)
EOSINOPHIL NFR BLD AUTO: 0 % — SIGNIFICANT CHANGE UP (ref 0–6)
ETHANOL SERPL-MCNC: <3 MG/DL — SIGNIFICANT CHANGE UP (ref 0–3)
GLUCOSE SERPL-MCNC: 103 MG/DL — HIGH (ref 70–99)
GLUCOSE UR QL: NEGATIVE MG/DL — SIGNIFICANT CHANGE UP
HCG SERPL-ACNC: <1 MIU/ML — SIGNIFICANT CHANGE UP
HCT VFR BLD CALC: 43.2 % — SIGNIFICANT CHANGE UP (ref 34.5–45)
HGB BLD-MCNC: 15 G/DL — SIGNIFICANT CHANGE UP (ref 11.5–15.5)
IMM GRANULOCYTES NFR BLD AUTO: 0.2 % — SIGNIFICANT CHANGE UP (ref 0–0.9)
KETONES UR-MCNC: ABNORMAL MG/DL
LEUKOCYTE ESTERASE UR-ACNC: NEGATIVE — SIGNIFICANT CHANGE UP
LYMPHOCYTES # BLD AUTO: 1.37 K/UL — SIGNIFICANT CHANGE UP (ref 1–3.3)
LYMPHOCYTES # BLD AUTO: 21.5 % — SIGNIFICANT CHANGE UP (ref 13–44)
MCHC RBC-ENTMCNC: 29.6 PG — SIGNIFICANT CHANGE UP (ref 27–34)
MCHC RBC-ENTMCNC: 34.7 G/DL — SIGNIFICANT CHANGE UP (ref 32–36)
MCV RBC AUTO: 85.4 FL — SIGNIFICANT CHANGE UP (ref 80–100)
MONOCYTES # BLD AUTO: 0.34 K/UL — SIGNIFICANT CHANGE UP (ref 0–0.9)
MONOCYTES NFR BLD AUTO: 5.3 % — SIGNIFICANT CHANGE UP (ref 2–14)
NEUTROPHILS # BLD AUTO: 4.65 K/UL — SIGNIFICANT CHANGE UP (ref 1.8–7.4)
NEUTROPHILS NFR BLD AUTO: 72.8 % — SIGNIFICANT CHANGE UP (ref 43–77)
NITRITE UR-MCNC: NEGATIVE — SIGNIFICANT CHANGE UP
NRBC BLD AUTO-RTO: 0 /100 WBCS — SIGNIFICANT CHANGE UP (ref 0–0)
PCP SPEC-MCNC: SIGNIFICANT CHANGE UP
PH UR: 7.5 — SIGNIFICANT CHANGE UP (ref 5–8)
PLATELET # BLD AUTO: 173 K/UL — SIGNIFICANT CHANGE UP (ref 150–400)
POTASSIUM SERPL-MCNC: 3.9 MMOL/L — SIGNIFICANT CHANGE UP (ref 3.5–5.3)
POTASSIUM SERPL-SCNC: 3.9 MMOL/L — SIGNIFICANT CHANGE UP (ref 3.5–5.3)
PROT SERPL-MCNC: 7.4 G/DL — SIGNIFICANT CHANGE UP (ref 6–8.3)
PROT UR-MCNC: NEGATIVE MG/DL — SIGNIFICANT CHANGE UP
RBC # BLD: 5.06 M/UL — SIGNIFICANT CHANGE UP (ref 3.8–5.2)
RBC # FLD: 11.9 % — SIGNIFICANT CHANGE UP (ref 10.3–14.5)
SALICYLATES SERPL-MCNC: 0.7 MG/DL — LOW (ref 2.8–20)
SARS-COV-2 RNA SPEC QL NAA+PROBE: SIGNIFICANT CHANGE UP
SODIUM SERPL-SCNC: 140 MMOL/L — SIGNIFICANT CHANGE UP (ref 135–145)
SP GR SPEC: 1.01 — SIGNIFICANT CHANGE UP (ref 1–1.03)
UROBILINOGEN FLD QL: 0.2 MG/DL — SIGNIFICANT CHANGE UP (ref 0.2–1)
WBC # BLD: 6.38 K/UL — SIGNIFICANT CHANGE UP (ref 3.8–10.5)
WBC # FLD AUTO: 6.38 K/UL — SIGNIFICANT CHANGE UP (ref 3.8–10.5)

## 2025-03-15 PROCEDURE — 84702 CHORIONIC GONADOTROPIN TEST: CPT

## 2025-03-15 PROCEDURE — 80307 DRUG TEST PRSMV CHEM ANLYZR: CPT

## 2025-03-15 PROCEDURE — 99285 EMERGENCY DEPT VISIT HI MDM: CPT

## 2025-03-15 PROCEDURE — 93010 ELECTROCARDIOGRAM REPORT: CPT

## 2025-03-15 PROCEDURE — 87635 SARS-COV-2 COVID-19 AMP PRB: CPT

## 2025-03-15 PROCEDURE — 80053 COMPREHEN METABOLIC PANEL: CPT

## 2025-03-15 PROCEDURE — 85025 COMPLETE CBC W/AUTO DIFF WBC: CPT

## 2025-03-15 PROCEDURE — 90792 PSYCH DIAG EVAL W/MED SRVCS: CPT | Mod: 95,GC

## 2025-03-15 PROCEDURE — 36415 COLL VENOUS BLD VENIPUNCTURE: CPT

## 2025-03-15 PROCEDURE — 84443 ASSAY THYROID STIM HORMONE: CPT

## 2025-03-15 PROCEDURE — 86769 SARS-COV-2 COVID-19 ANTIBODY: CPT

## 2025-03-15 PROCEDURE — 81003 URINALYSIS AUTO W/O SCOPE: CPT

## 2025-03-15 PROCEDURE — 93005 ELECTROCARDIOGRAM TRACING: CPT

## 2025-03-15 RX ORDER — LAMOTRIGINE 150 MG/1
1 TABLET ORAL
Refills: 0 | DISCHARGE

## 2025-03-15 RX ORDER — DESVENLAFAXINE 25 MG/1
20 TABLET, EXTENDED RELEASE ORAL
Refills: 0 | DISCHARGE

## 2025-03-15 NOTE — ED PROVIDER NOTE - PROGRESS NOTE DETAILS
Discussed with telepsychiatry, will see patient shortly. Discussed with the patient regarding her symptoms, importance of close prompt follow-up with her psychiatrist, and her therapist.  The patient will return if she has any further thoughts of hurting herself, or if she needs a safe place to go.  The patient will otherwise follow-up with her psychiatrist and her psychologist as an outpatient. Discussed with telepsychiatry attending.  After discussing with the patient and the patient's mother, they have decided that the patient can be discharged home.  They have done a safety plan with the patient, and the patient is not actively suicidal.  The patient has an appointment with her psychiatrist on Tuesday, and will increase her therapy to twice a week.

## 2025-03-15 NOTE — ED ADULT NURSE REASSESSMENT NOTE - NS ED NURSE REASSESS COMMENT FT1
mother spoken with and no concerns about d/c . pt re evaluated by md and to be d'c/d  pt discharged stable and ambulatory in nad at present d/c instruction reinforced and pt verbalized understanding vital signs as charted  valuables returned by security

## 2025-03-15 NOTE — ED BEHAVIORAL HEALTH ASSESSMENT NOTE - DESCRIPTION
Patient maintained good behavioral control and did not require any medications for agitation/violence.  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  Vital signs notable for , /83  Routine labs unremarkable CBC, BMP, LFT, HCG, EtOH    Vital Signs Last 24 Hrs  T(C): 36.7 (15 Mar 2025 14:36), Max: 36.7 (15 Mar 2025 14:36)  T(F): 98 (15 Mar 2025 14:36), Max: 98 (15 Mar 2025 14:36)  HR: 122 (15 Mar 2025 14:36) (122 - 122)  BP: 150/83 (15 Mar 2025 14:36) (150/83 - 150/83)  BP(mean): --  RR: 14 (15 Mar 2025 14:36) (14 - 14)  SpO2: 99% (15 Mar 2025 14:36) (99% - 99%)

## 2025-03-15 NOTE — ED BEHAVIORAL HEALTH ASSESSMENT NOTE - HPI (INCLUDE ILLNESS QUALITY, SEVERITY, DURATION, TIMING, CONTEXT, MODIFYING FACTORS, ASSOCIATED SIGNS AND SYMPTOMS)
31yo domiciled with parents, brother, shared custody with ex-partner 6yo, employed as peer advocate for substance abuse, [email or web address] in [place], PPHx HU, panic, bipolar 2, follows with outpatient psych Dr. 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 Or how life would look like without me?"   Also reports thinking of of overdose.   Reports she's been feeling depressed for the past 5 months but having these thoughts for the past 4 days. Still going to work, keeping up with ADLs.   Anger from coming out of a narcisstic relationship.     feeling more down and that's making her more anxious   Doesn't wan to be a burden on her family at home.   She's concerned that her medications are working well    she feels that Pristiq may have caused her to feel this way.       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 Arenas and weekly  with therapist Cinthia Dennis, no prior EtOH use d/o sober for 4 years, no legal/violence hx, PMH HELLP syndrome during pregnancy, BIB self after having intrusive distressing thoughts for 4 days of wanting to kill herself.     Reports having fleeting thoughts of wanting to kill herself in setting of significant stressors including father's terminal stage 4 cancer, pending divorce and separation from wife of 2 years. Describes these thoughts as "What would happen if I drove into the middle of the road? Or how life would look like without me?" Denies any intent or plans. Reports that these thoughts are new to her and are very scary prompting her to come to the ED. She didn't want to disturb her therapist and was feeling overwhelmed. Reports she's been feeling depressed for the past 5 months but having these thoughts for the past 4 days. Still going to work, keeping up with ADLs. Reports feelings of guilt for leaving relationship with wife but said it was for her own mental health. Reports feeling more down and that's making her more anxious and ruminative than usual. Doesn't wan to be a burden on her family at home. Denies substance use (4 years sober). Denies current/lifetime sxs of yasir/hypomania, only endorsing prior ruminative thoughts.     She's concerned that her medications aren't working well, especially that Pristiq may have caused her to feel this way. Reports normal sleep, endorses guilt, denies hopelessness/worthelssness, decreased energy, normal concentration/appetite, no PMR. She is not interested in psych admission. She has an appt w/ her psychiatrist on Tuesday and is amenable to discussing potential medications at that appointment. Also able to increase frequency of therapy in the short term.     Current Rx: lamictal 200mg daily, pristiq 20mg, and klonopin 1mg TID  lamcital x 5 years, klonopin x 6 years, pristiq x 5 months. SSRI's used to work for her in the past until they stopped working and made her more depressed.    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 Arenas and weekly  with therapist Cinthia Dennis, prior EtOH use d/o sober for 4 years, no legal/violence hx, PMH HELLP syndrome during pregnancy, BIBself after having intrusive distressing thoughts for 4 days of wanting to kill herself.     Reports having fleeting thoughts of wanting to kill herself in setting of significant stressors including father's terminal stage 4 cancer, pending divorce and separation from wife of 2 years. Describes these thoughts as "What would happen if I drove into the middle of the road? Or how life would look like without me?" Denies any intent or plans. Reports that these thoughts are new to her and are very scary prompting her to come to the ED. She didn't want to disturb her therapist and was feeling overwhelmed. Reports she's been feeling depressed for the past 5 months but having these thoughts for the past 4 days. Still going to work, keeping up with ADLs. Reports feelings of guilt for leaving relationship with wife but said it was for her own mental health. Reports feeling more down and that's making her more anxious and ruminative than usual. Doesn't want to be a burden on her family at home. Denies substance use (4 years sober). Denies current/lifetime sxs of yasir/hypomania, only endorsing prior ruminative thoughts.     She's concerned that her medications aren't working well, especially that Pristiq may have caused her to feel more depressed (has been taking for the past 5 months). Reports normal sleep, endorses guilt, denies hopelessness/worthelssness, decreased energy, normal concentration/appetite, no PMR. Patient denies any current PSI/SI/IP, denies any current HI/AVH. She is not interested in psych admission. She has an appt w/ her psychiatrist on Tuesday and is amenable to discussing potential medications at that appointment. Also able to increase frequency of therapy from once a week to twice a week in the short term (will contact therapist on Monday to do so). Patient denies any safety concerns being discharged home and is able to complete safety plan.    Current Rx: lamictal 200mg daily, pristiq 20mg, and klonopin 1mg TID  lamcital x 5 years, klonopin x 6 years, pristiq x 5 months. SSRI's used to work for her in the past until they stopped working and made her more depressed.    See SW note for collateral from mother. Mother corroborates patient's information, expresses she believes patient was overwhelmed due to stressors and that patient expressed to mother upon her arrival to ED that she would not actually harm/kill herself and had no intent/plan. Mother denies any acute safety concerns with patient being discharged.

## 2025-03-15 NOTE — ED PROVIDER NOTE - PSYCHIATRIC, MLM
Alert and oriented to person, place, time/situation. Patient tearful, depressed mood and affect, good insight

## 2025-03-15 NOTE — ED PROVIDER NOTE - PATIENT PORTAL LINK FT
You can access the FollowMyHealth Patient Portal offered by [facility] by registering at the following website: [email or web address]. By joining [employer]’s FollowMyHealth portal, you will also be able to view your health information using other applications (apps) compatible with our system.

## 2025-03-15 NOTE — ED BEHAVIORAL HEALTH ASSESSMENT NOTE - REFERRAL / APPOINTMENT DETAILS
Tuesday at 4:30pm virtually with Dr. Eli Tuesday at 4:30pm virtually with Dr. Eli, currently has appointment with therapist on Saturday (3/22), but will call on Monday to make additional sooner appointment and increase therapy to twice a week.

## 2025-03-15 NOTE — ED PROVIDER NOTE - DIFFERENTIAL DIAGNOSIS
Acute suicidal ideation, electrolyte abnormality, thyroid, other acute pathology Differential Diagnosis

## 2025-03-15 NOTE — ED BEHAVIORAL HEALTH ASSESSMENT NOTE - NSBHATTESTCOMMENTATTENDFT_PSY_A_CORE
Patient seen with resident Dr. Rees. Chart reviewed. Case discussed with ED Attending. Entire note reviewed, and note editted/addended by this Attending as necessary. Agree with assessment and plan as detailed above.

## 2025-03-15 NOTE — ED PROVIDER NOTE - CLINICAL SUMMARY MEDICAL DECISION MAKING FREE TEXT BOX
Acute suicidal ideation, increased depression.  Will check labs, x-ray, alcohol, telepsychiatry, possible admission

## 2025-03-15 NOTE — ED ADULT NURSE NOTE - NSSEPSISNEWALTERMENTAL_ED_A_ED
Detail Level: Simple Hide Additional Notes?: No Detail Level: Zone No Include Location In Plan?: Yes

## 2025-03-15 NOTE — ED BEHAVIORAL HEALTH ASSESSMENT NOTE - RISK ASSESSMENT
Patient is at chronically elevated risk of self-harm/suicide given history of mood disorder, anxiety, prior substance abuse. Acute risk of grievous self-harm or suicide is currently low given current denial of self-injurious or suicidal ideation, intent, and/or plan. Their risk is mitigated by several protective factors including no prior hx of SA, engagement in psychiatric care, future-orientation, good social supports, sobriety from substances x 4 years. Acute risk of violence is low given no hx of violence and denial of violent or homicidal ideation, intent, and /or plan and aforementioned protective factors.

## 2025-03-15 NOTE — BH SAFETY PLAN - SUICIDE AND CRISIS LIFELINE, CALL [phone]
32 year old presented with fever, cough and shortness of breath.  Admitted with possible COVID 19 infection.  Currently on hydroxycloroquine and steroids.  On Lovenox for DVT prophylaxis.    COVID19 PCR result: pending    Acute Hypoxic Respiratory Failure: currently on nonrebreather mask, 15L oxygen saturations 96%.  Continue to monitor oxygen requirement and respiratory status closely.    Avoid HFNC, Nebulized treatments, and NIPPV with BIPAP/CPAP.    Avoid NSAIDs. Use Tylenol PRN for fever, pain instead Suicide and Crisis Lifeline, call [phone]

## 2025-03-15 NOTE — ED BEHAVIORAL HEALTH ASSESSMENT NOTE - SUMMARY
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 Arenas and weekly  with therapist Cinthia Dennis, no prior EtOH use d/o sober for 4 years, no legal/violence hx, PMH HELLP syndrome during pregnancy, BIB self after having intrusive distressing thoughts for 4 days of wanting to kill herself.     History notable for 5 months of depressive symptoms now with 4 days of ego dystonic distressing SI w/o intent or plan in setting of pending divorce from wife and father with terminal cancer. Collateral from reassuring from safety perspective. On exam, she is calm, euthymic, linear, organized. Impression is adjustment vs MDD, her prior bipolar diagnosis is not convincing. She is already connected to care with psychiatrist and weekly therapist. Next appt is Tuesday at 4:30p w/ psychiatrist in which she can discuss concerns of Pristiq. She is future oriented, treatment seeking, and is able to engage in safety planning. She is psychiatrically cleared for discharge. 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 Arenas and weekly  with therapist Cinthia Dennis, prior EtOH use d/o sober for 4 years, no legal/violence hx, PMH HELLP syndrome during pregnancy, BIB self after having intrusive distressing thoughts for 4 days of wanting to kill herself.     History notable for 5 months of depressive symptoms now with 4 days of ego dystonic distressing SI w/o intent or plan in setting of pending divorce from wife and father with terminal cancer. Patient states she would never actually harm self or attempt to kill herself and as she had never experienced SI in past, it had scared her to experiencing passive SI therefore she brought herself to the ED for evaluation. Patient does not desire psychiatric admission, feels safe to be discharged home, and is able to engage in safety planning. Collateral from reassuring from safety perspective. On exam, she is calm, euthymic, linear, organized. Impression is adjustment vs MDD, her prior bipolar diagnosis is not convincing. She is already connected to care with psychiatrist and weekly therapist. Next appt is Tuesday at 4:30p w/ psychiatrist in which she can discuss concerns of Pristiq. She is future oriented, help seeking, engaged and compliant with treatment.. She is psychiatrically cleared for discharge.

## 2025-03-15 NOTE — ED BEHAVIORAL HEALTH NOTE - BEHAVIORAL HEALTH NOTE
===================     PRE-HOSPITAL COURSE     ==================     SOURCE: Chart review     DETAILS: Patient walked in to [place] ED w/ complaint of SI     ============     ED COURSE     ============     SOURCE: Chart review     ARRIVAL: Walk in     BELONGINGS: n/a     BEHAVIOR: Per triage note, A&O x 4,      TREATMENT: n/a     VISITORS: Mother at      ========================     FOR EACH COLLATERAL     ========================     Collateral below has requested that the information provided remain confidential: Yes [  ] No [ x ]     Collateral below has provided information that patient is/may be unaware of: Yes [  ] No [ x ]     Patient gives permission to obtain collateral from mother, Josh Mojica     (  ) Yes     ( x )  No     Rationale for overriding objection               (  ) Lack of capacity. Details: ________               ( x ) Assessing risk of danger to self/others. Details: patient presenting to ED with SI     Rationale for selecting specific collateral source               (  ) Potential to impact risk of danger to self/others and no alternative equivalent. Details: _____     NAME: Josh Mojica     NUMBER: [phone]     RELATIONSHIP: Mother     RELIABILITY: High     COMMENTS: father has stage 4 cancer, was just released from the hospital, and this is weighing heavily on her, her ex-wife called her today and told her she had some type of cancer and did not answer any questions. Got  in July 2024, were together two years before marriage, and have been  for a couple months. Has hx of acute anxiety dx. Per patient’s mother, patient’s brother is her best friend, but he is the primary caregiver to their father so she didn't want to burden her family and instead came into the ED. She said to mother she would never take action on suicidal thoughts because of Varun, her son. Has a therapist (Nora) she sees 2x a week; and has psychiatrist. Has a lot of protective factors, does yoga, coaches' people about mental health. 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 Going to school to study [email or web address], graduates in July. Works very stressful job. Shares her 6 y/o son with son’s father. Patient’s mother is not concerned about her harming herself and feels inpatient stay would not be good for patient.      ========================     PATIENT DEMOGRAPHICS:     ========================     HPI: 34-year-old female with a history of depression, anxiety, bipolar disorder presents with has been feeling increasingly depressed over the past 4 to 5 days.  The patient states that she has been thinking about hurting herself.  The patient states that she would either overdose on her medication, or drive her car away to herself.  The patient has not taken any additional medications or made any attempts to hurt her self at this time.       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     DATE HPI STARTED: 3/15/25     DECOMPENSATION: Patient presented to ED Stating SI, has hx of anxiety      SUICIDALITY: Per chart review, patient has been thinking of hurting herself; patient informed mother she would not take any actions to hurt herself because she wants to be present for her son, Jose F.      VIOLENCE: No hx of violence     SUBSTANCE: Has hx of alcoholism, not actively using at least in past 1 year.       ========================     PAST PSYCHIATRIC HISTORY     ========================     DATE PAST PSYCHIATRIC HISTORY STARTED: “A long time”     MAIN PSYCHIATRIC DIAGNOSIS: Anxiety, health related anxiety; no prior psychiatric hospitalizations     PSYCHIATRIC HOSPITALIZATIONS: Lamictol, Klonopin     PRIOR ILLNESS: n/a     SUICIDALITY: Maybe once in the past, due to medications being adjusted many years ago (at least five years).      VIOLENCE: Never     SUBSTANCE USE: hx of alcoholism     ==============     OTHER HISTORY     ==============     CURRENT MEDICATION: Takes stomach medications      MEDICAL HISTORY: None     ALLERGIES: Seroquel      LEGAL ISSUES: No legal issues     FIREARM ACCESS: No access     SOCIAL HISTORY: Has great friends, close to her brother and mother. Has a lot of people she can go to. She is very amicable with her child’s father. She is the support system for a lot of her friends.       FAMILY HISTORY: None     DEVELOPMENTAL HISTORY: None             ==============     COVID Exposure Screen- collateral (i.e. third-party, chart review, belongings, etc; include EMS and ED staff     ==============     Has the patient been tested for COVID-19 in the last 90 days?  (  ) Yes   (  ) No   ( x ) Unknown- Reason: _____     IF YES: Date of test(s), type of test(s), result(s) for ALL tests in last 90 days: ________     In the past 10 days, has the patient been around anyone with a positive COVID-19 test? (  ) Yes   (  ) No   ( x ) Unknown- Reason: ____     IF YES: Was the patient closer than 6 feet of them for a total of 15 minutes or more in a 24 hour period? (  ) Yes   (  ) No   (  ) Unknown- Reason: _____

## 2025-03-15 NOTE — ED BEHAVIORAL HEALTH ASSESSMENT NOTE - DETAILS
child 8yo son in the chart per HPI, 4 days of SI w/ method but no intent/plan. No other lifetime hx of SA/NSSIB seroquel allergy self referred see separate note

## 2025-03-15 NOTE — ED BEHAVIORAL HEALTH ASSESSMENT NOTE - NS ED BHA PLAN TR BH CONTACTED FT
contact information not available, will attempt to find and leave a voicemail Dr. Durand (#[phone]), left voice message updating on plan

## 2025-03-15 NOTE — ED BEHAVIORAL HEALTH ASSESSMENT NOTE - SAFETY PLAN ADDT'L DETAILS
Safety plan discussed with.../Education provided regarding environmental safety / lethal means restriction/Provision of National Suicide Prevention Lifeline 6-666-783-XASN (7984)

## 2025-03-15 NOTE — ED PROVIDER NOTE - OBJECTIVE STATEMENT
34-year-old female with a history of depression, anxiety, bipolar disorder presents with has been feeling increasingly depressed over the past 4 to 5 days.  The patient states that she has been thinking about hurting herself.  The patient states that she would either overdose on her medication, or drive her car away to herself.  The patient has not taken any additional medications or made any attempts to hurt her self at this time.  No acute chest pain.  No shortness of breath.  No abdominal pain.  No vomiting or diarrhea.  No somatic complaints.  No aggravating or alleviating factors otherwise noted.  No other acute complaints.

## 2025-03-15 NOTE — ED PROVIDER NOTE - NSFOLLOWUPINSTRUCTIONS_ED_ALL_ED_FT
1. Follow-up with your Primary Medical Doctor. Call Monday for prompt follow-up.  2. Return to Emergency room for any worsening or persistent pain, weakness, fever, any further thoughts of hurting yourself, if you need a safe place to go, feeling overwhelmed or any other concerning symptoms.  3. See attached instruction sheets for additional information, including information regarding signs and symptoms to look out for, reasons to seek immediate care and other important instructions.  4. Follow-up with your psychiatrist on Tuesday as scheduled  5.  Follow-up with your therapist on Monday to increase your sessions to twice weekly

## 2025-03-16 LAB
SARS-COV-2 IGG+IGM SERPL QL IA: >250 U/ML — HIGH
SARS-COV-2 IGG+IGM SERPL QL IA: POSITIVE
TSH SERPL-MCNC: 1.05 UIU/ML — SIGNIFICANT CHANGE UP (ref 0.27–4.2)

## 2025-03-21 NOTE — ED PROVIDER NOTE - TEMPLATE
Called and spoke with Pt in regards to scheduling a skin biopsy with Naomi JOHNSON, per Dr Arguello request. Sent Leeroy Moreno, a message to start PA for biopsy. Pt is scheduled 5/8   
Abdominal Pain, N/V/D

## 2025-03-28 ENCOUNTER — NON-APPOINTMENT (OUTPATIENT)
Age: 35
End: 2025-03-28

## 2025-03-28 ENCOUNTER — EMERGENCY (EMERGENCY)
Facility: HOSPITAL | Age: 35
LOS: 0 days | Discharge: HOME | End: 2025-03-28
Attending: STUDENT IN AN ORGANIZED HEALTH CARE EDUCATION/TRAINING PROGRAM
Payer: MEDICAID

## 2025-03-28 VITALS
SYSTOLIC BLOOD PRESSURE: 111 MMHG | TEMPERATURE: 98 F | HEART RATE: 65 BPM | RESPIRATION RATE: 16 BRPM | DIASTOLIC BLOOD PRESSURE: 66 MMHG | OXYGEN SATURATION: 100 %

## 2025-03-28 VITALS — HEIGHT: 61 IN | WEIGHT: 100.09 LBS

## 2025-03-28 DIAGNOSIS — K58.9 IRRITABLE BOWEL SYNDROME, UNSPECIFIED: ICD-10-CM

## 2025-03-28 DIAGNOSIS — Z88.8 ALLERGY STATUS TO OTHER DRUGS, MEDICAMENTS AND BIOLOGICAL SUBSTANCES: ICD-10-CM

## 2025-03-28 DIAGNOSIS — F42.9 OBSESSIVE-COMPULSIVE DISORDER, UNSPECIFIED: ICD-10-CM

## 2025-03-28 DIAGNOSIS — R10.30 LOWER ABDOMINAL PAIN, UNSPECIFIED: ICD-10-CM

## 2025-03-28 DIAGNOSIS — Z88.0 ALLERGY STATUS TO PENICILLIN: ICD-10-CM

## 2025-03-28 DIAGNOSIS — Z86.2 PERSONAL HISTORY OF DISEASES OF THE BLOOD AND BLOOD-FORMING ORGANS AND CERTAIN DISORDERS INVOLVING THE IMMUNE MECHANISM: ICD-10-CM

## 2025-03-28 DIAGNOSIS — F41.9 ANXIETY DISORDER, UNSPECIFIED: ICD-10-CM

## 2025-03-28 DIAGNOSIS — K57.92 DIVERTICULITIS OF INTESTINE, PART UNSPECIFIED, WITHOUT PERFORATION OR ABSCESS WITHOUT BLEEDING: ICD-10-CM

## 2025-03-28 DIAGNOSIS — Z98.82 BREAST IMPLANT STATUS: Chronic | ICD-10-CM

## 2025-03-28 LAB
ALBUMIN SERPL ELPH-MCNC: 4.1 G/DL — SIGNIFICANT CHANGE UP (ref 3.3–5)
ALP SERPL-CCNC: 61 U/L — SIGNIFICANT CHANGE UP (ref 40–120)
ALT FLD-CCNC: 22 U/L — SIGNIFICANT CHANGE UP (ref 12–78)
ANION GAP SERPL CALC-SCNC: 2 MMOL/L — LOW (ref 5–17)
APPEARANCE UR: CLEAR — SIGNIFICANT CHANGE UP
AST SERPL-CCNC: 11 U/L — LOW (ref 15–37)
BASOPHILS # BLD AUTO: 0.01 K/UL — SIGNIFICANT CHANGE UP (ref 0–0.2)
BASOPHILS NFR BLD AUTO: 0.1 % — SIGNIFICANT CHANGE UP (ref 0–2)
BILIRUB SERPL-MCNC: 0.4 MG/DL — SIGNIFICANT CHANGE UP (ref 0.2–1.2)
BILIRUB UR-MCNC: NEGATIVE — SIGNIFICANT CHANGE UP
BUN SERPL-MCNC: 15 MG/DL — SIGNIFICANT CHANGE UP (ref 7–23)
CALCIUM SERPL-MCNC: 8.9 MG/DL — SIGNIFICANT CHANGE UP (ref 8.5–10.1)
CHLORIDE SERPL-SCNC: 105 MMOL/L — SIGNIFICANT CHANGE UP (ref 96–108)
CO2 SERPL-SCNC: 31 MMOL/L — SIGNIFICANT CHANGE UP (ref 22–31)
COLOR SPEC: YELLOW — SIGNIFICANT CHANGE UP
CREAT SERPL-MCNC: 0.96 MG/DL — SIGNIFICANT CHANGE UP (ref 0.5–1.3)
DIFF PNL FLD: NEGATIVE — SIGNIFICANT CHANGE UP
EGFR: 80 ML/MIN/1.73M2 — SIGNIFICANT CHANGE UP
EGFR: 80 ML/MIN/1.73M2 — SIGNIFICANT CHANGE UP
EOSINOPHIL # BLD AUTO: 0 K/UL — SIGNIFICANT CHANGE UP (ref 0–0.5)
EOSINOPHIL NFR BLD AUTO: 0 % — SIGNIFICANT CHANGE UP (ref 0–6)
GLUCOSE SERPL-MCNC: 103 MG/DL — HIGH (ref 70–99)
GLUCOSE UR QL: NEGATIVE MG/DL — SIGNIFICANT CHANGE UP
HCT VFR BLD CALC: 39 % — SIGNIFICANT CHANGE UP (ref 34.5–45)
HGB BLD-MCNC: 13.1 G/DL — SIGNIFICANT CHANGE UP (ref 11.5–15.5)
IMM GRANULOCYTES # BLD AUTO: 0.02 K/UL — SIGNIFICANT CHANGE UP (ref 0–0.07)
IMM GRANULOCYTES NFR BLD AUTO: 0.2 % — SIGNIFICANT CHANGE UP (ref 0–0.9)
KETONES UR-MCNC: NEGATIVE MG/DL — SIGNIFICANT CHANGE UP
LEUKOCYTE ESTERASE UR-ACNC: NEGATIVE — SIGNIFICANT CHANGE UP
LIDOCAIN IGE QN: 52 U/L — SIGNIFICANT CHANGE UP (ref 13–75)
LYMPHOCYTES # BLD AUTO: 1.09 K/UL — SIGNIFICANT CHANGE UP (ref 1–3.3)
LYMPHOCYTES NFR BLD AUTO: 12.1 % — LOW (ref 13–44)
MCHC RBC-ENTMCNC: 29.7 PG — SIGNIFICANT CHANGE UP (ref 27–34)
MCHC RBC-ENTMCNC: 33.6 G/DL — SIGNIFICANT CHANGE UP (ref 32–36)
MCV RBC AUTO: 88.4 FL — SIGNIFICANT CHANGE UP (ref 80–100)
MONOCYTES # BLD AUTO: 0.65 K/UL — SIGNIFICANT CHANGE UP (ref 0–0.9)
MONOCYTES NFR BLD AUTO: 7.2 % — SIGNIFICANT CHANGE UP (ref 2–14)
NEUTROPHILS # BLD AUTO: 7.25 K/UL — SIGNIFICANT CHANGE UP (ref 1.8–7.4)
NEUTROPHILS NFR BLD AUTO: 80.4 % — HIGH (ref 43–77)
NITRITE UR-MCNC: NEGATIVE — SIGNIFICANT CHANGE UP
NRBC # BLD AUTO: 0 K/UL — SIGNIFICANT CHANGE UP (ref 0–0)
NRBC # FLD: 0 K/UL — SIGNIFICANT CHANGE UP (ref 0–0)
NRBC BLD AUTO-RTO: 0 /100 WBCS — SIGNIFICANT CHANGE UP (ref 0–0)
PH UR: 7.5 — SIGNIFICANT CHANGE UP (ref 5–8)
PLATELET # BLD AUTO: 172 K/UL — SIGNIFICANT CHANGE UP (ref 150–400)
PMV BLD: 10.6 FL — SIGNIFICANT CHANGE UP (ref 7–13)
POCT URINE PREGNANCY TEST: NEGATIVE — SIGNIFICANT CHANGE UP
POTASSIUM SERPL-MCNC: 4.3 MMOL/L — SIGNIFICANT CHANGE UP (ref 3.5–5.3)
POTASSIUM SERPL-SCNC: 4.3 MMOL/L — SIGNIFICANT CHANGE UP (ref 3.5–5.3)
PROT SERPL-MCNC: 7.5 GM/DL — SIGNIFICANT CHANGE UP (ref 6–8.3)
PROT UR-MCNC: NEGATIVE MG/DL — SIGNIFICANT CHANGE UP
RBC # BLD: 4.41 M/UL — SIGNIFICANT CHANGE UP (ref 3.8–5.2)
RBC # FLD: 11.9 % — SIGNIFICANT CHANGE UP (ref 10.3–14.5)
SODIUM SERPL-SCNC: 138 MMOL/L — SIGNIFICANT CHANGE UP (ref 135–145)
SP GR SPEC: >1.03 — HIGH (ref 1–1.03)
UROBILINOGEN FLD QL: 0.2 MG/DL — SIGNIFICANT CHANGE UP (ref 0.2–1)
WBC # BLD: 9.02 K/UL — SIGNIFICANT CHANGE UP (ref 3.8–10.5)
WBC # FLD AUTO: 9.02 K/UL — SIGNIFICANT CHANGE UP (ref 3.8–10.5)

## 2025-03-28 PROCEDURE — 83690 ASSAY OF LIPASE: CPT

## 2025-03-28 PROCEDURE — 96374 THER/PROPH/DIAG INJ IV PUSH: CPT | Mod: XU

## 2025-03-28 PROCEDURE — 80053 COMPREHEN METABOLIC PANEL: CPT

## 2025-03-28 PROCEDURE — 81025 URINE PREGNANCY TEST: CPT

## 2025-03-28 PROCEDURE — 81003 URINALYSIS AUTO W/O SCOPE: CPT

## 2025-03-28 PROCEDURE — 76830 TRANSVAGINAL US NON-OB: CPT | Mod: 26

## 2025-03-28 PROCEDURE — 36415 COLL VENOUS BLD VENIPUNCTURE: CPT

## 2025-03-28 PROCEDURE — 99284 EMERGENCY DEPT VISIT MOD MDM: CPT | Mod: 25

## 2025-03-28 PROCEDURE — 76830 TRANSVAGINAL US NON-OB: CPT

## 2025-03-28 PROCEDURE — 99285 EMERGENCY DEPT VISIT HI MDM: CPT

## 2025-03-28 PROCEDURE — 74177 CT ABD & PELVIS W/CONTRAST: CPT | Mod: MC

## 2025-03-28 PROCEDURE — 96375 TX/PRO/DX INJ NEW DRUG ADDON: CPT

## 2025-03-28 PROCEDURE — 85025 COMPLETE CBC W/AUTO DIFF WBC: CPT

## 2025-03-28 PROCEDURE — 74177 CT ABD & PELVIS W/CONTRAST: CPT | Mod: 26

## 2025-03-28 RX ORDER — ACETAMINOPHEN 500 MG/5ML
700 LIQUID (ML) ORAL ONCE
Refills: 0 | Status: COMPLETED | OUTPATIENT
Start: 2025-03-28 | End: 2025-03-28

## 2025-03-28 RX ORDER — METRONIDAZOLE 250 MG
1 TABLET ORAL
Qty: 30 | Refills: 0
Start: 2025-03-28 | End: 2025-04-06

## 2025-03-28 RX ORDER — CIPROFLOXACIN HCL 250 MG
1 TABLET ORAL
Qty: 20 | Refills: 0
Start: 2025-03-28 | End: 2025-04-06

## 2025-03-28 RX ORDER — CIPROFLOXACIN HCL 250 MG
500 TABLET ORAL ONCE
Refills: 0 | Status: COMPLETED | OUTPATIENT
Start: 2025-03-28 | End: 2025-03-28

## 2025-03-28 RX ORDER — ONDANSETRON HCL/PF 4 MG/2 ML
1 VIAL (ML) INJECTION
Qty: 10 | Refills: 0
Start: 2025-03-28

## 2025-03-28 RX ORDER — ONDANSETRON HCL/PF 4 MG/2 ML
4 VIAL (ML) INJECTION ONCE
Refills: 0 | Status: COMPLETED | OUTPATIENT
Start: 2025-03-28 | End: 2025-03-28

## 2025-03-28 RX ORDER — METRONIDAZOLE 250 MG
500 TABLET ORAL ONCE
Refills: 0 | Status: COMPLETED | OUTPATIENT
Start: 2025-03-28 | End: 2025-03-28

## 2025-03-28 RX ADMIN — Medication 500 MILLIGRAM(S): at 20:05

## 2025-03-28 RX ADMIN — Medication 1000 MILLILITER(S): at 18:20

## 2025-03-28 RX ADMIN — Medication 4 MILLIGRAM(S): at 18:20

## 2025-03-28 RX ADMIN — Medication 280 MILLIGRAM(S): at 19:11

## 2025-03-28 NOTE — ED STATDOCS - PATIENT PORTAL LINK FT
You can access the FollowMyHealth Patient Portal offered by [facility] by registering at the following website: [email or web address]. By joining [employer]’s FollowMyHealth portal, you will also be able to view your health information using other applications (apps) compatible with our system.

## 2025-03-28 NOTE — ED ADULT NURSE NOTE - NS ED NURSE RECORD ANOTHER VITAL SIGN
This Rx Request does not qualify for assessment with the [facility]   Please review protocol details and the Care Due Message for extra clinical information    Reasons Rx Request may be deferred:  Change request from Pharmacy-see pharmacy comment for details    Note composed:4:08 PM 03/28/2022             Yes

## 2025-03-28 NOTE — ED ADULT NURSE NOTE - OBJECTIVE STATEMENT
Pt is 35 yo female c/o abdominal pain. Pt reports pain with slight nausea. Pain starts at LLQ and radiates upwards. Pt seen at , sent to ED to r/o appendicitis and ovarian concerns. Pt PMHX ocd and anxiety. denies cp, vomiting, sob.

## 2025-03-28 NOTE — ED STATDOCS - CARE PROVIDER_API CALL
Kt Oropeza  Gastroenterology  [address] [place] [id number]  Phone: [phone]  Fax: [phone]  Follow Up Time:

## 2025-03-28 NOTE — ED STATDOCS - NSFOLLOWUPINSTRUCTIONS_ED_ALL_ED_FT
FOLLOW UP WITH A GASTROENTEROLOGIST AND YOUR PRIMARY DOCTOR THIS WEEK. RETURN TO ER FOR ANY WORSENING SYMPTOMS OR NEW CONCERNS.    Diverticulitis    WHAT YOU NEED TO KNOW:    Diverticulitis is a condition that causes small pockets along your intestine called diverticula to become inflamed or infected. This is caused by hard bowel movements, food, or bacteria that get stuck in the pockets.         DISCHARGE INSTRUCTIONS:    Return to the emergency department if:     You have bowel movement or foul-smelling discharge leaking from your vagina or in your urine.      You have severe diarrhea.      You urinate less than usual or not at all.      You are not able to have a bowel movement.      You cannot stop vomiting.       You have severe abdominal pain, a fever, and your abdomen is larger than usual.       You have new or increased blood in your bowel movements.     Contact your healthcare provider if:     You have pain when you urinate.      Your symptoms get worse or do not go away.       You have questions or concerns about your condition or care.     Medicines:     Antibiotics may be given to help treat a bacterial infection.      Prescription pain medicine may be given. Ask your healthcare provider how to take this medicine safely. Some prescription pain medicines contain acetaminophen. Do not take other medicines that contain acetaminophen without talking to your healthcare provider. Too much acetaminophen may cause liver damage. Prescription pain medicine may cause constipation. Ask your healthcare provider how to prevent or treat constipation.       Take your medicine as directed. Contact your healthcare provider if you think your medicine is not helping or if you have side effects. Tell him or her if you are allergic to any medicine. Keep a list of the medicines, vitamins, and herbs you take. Include the amounts, and when and why you take them. Bring the list or the pill bottles to follow-up visits. Carry your medicine list with you in case of an emergency.    Clear liquid diet: A clear liquid diet includes any liquids that you can see through. Examples include water, ginger-ingris, cranberry or apple juice, frozen fruit ice, or broth. Stay on a clear liquid diet until your symptoms are gone, or as directed.     Follow up with your healthcare provider as directed: You may need to return for a colonoscopy. When your symptoms are gone, you may need a low-fat, high-fiber diet to prevent diverticulitis from developing again. Your healthcare provider or dietitian can help you create meal plans. Write down your questions so you remember to ask them during your visits.

## 2025-03-28 NOTE — ED ADULT TRIAGE NOTE - CHIEF COMPLAINT QUOTE
patient sent from urgent care c/o lower abdominal pain since this morning.  c/o nausea.  denies urinary symptoms.

## 2025-03-28 NOTE — ED STATDOCS - PHYSICAL EXAMINATION
Constitutional: well appearing, NAD AAOx3  Eyes: EOMI, PERRL  Head: Normocephalic atraumatic  Mouth: no airway obstruction, posterior oropharynx clear without erythema or exudate  Neck: supple  Cardiac: regular rate and rhythm, no MRG  Resp: Lungs CTAB  GI: Abd LLQ RUQ RLQ tenderness. Voluntary guarding. No rebound. No CVAT.  Neuro: CN2-12 intact, strength 5/5x4, sensation grossly intact  Skin: No rashes

## 2025-03-28 NOTE — ED STATDOCS - CLINICAL SUMMARY MEDICAL DECISION MAKING FREE TEXT BOX
Presentation concerning for cholecystitis diverticulitis, pyonephritis, appendicitis, Plan for pain control, labs, UA, ultrasound, CT. Will monitor and reassess Presentation concerning for cholecystitis diverticulitis, pyonephritis, appendicitis, Plan for pain control, labs, UA, ultrasound, CT. Will monitor and reassess  Labs show no leukocytosis, pt does not meet sepsis criteria. CT shows acute uncomplicated diverticulitis. UA neg for UTI.   Pt tolerated PO, repeat abx exam benign. Pt treated with cipro flagyl and is comfortable with dc. Given follow up instrutions, strict return precautions and dc in stable condition

## 2025-03-28 NOTE — ED STATDOCS - OBJECTIVE STATEMENT
33 y/o F with PMHx of anemia, anxiety, OCD, and IBS presents to the ED c/o lower ABD pain since this morning. Pt came from  for concerns of ovary damage. Pt states she usually gets pain during ovulation but this pain feels more severe. Pt reports the pain started on the left side but it radiates to the whole abd. Pt endorses nausea and slight hematuria this morning, lethargy, chills, constipation. Pt denies fever. Last bowel movement 2 days ago. Pt has taken zofran for nausea and advil for pain last at 4:15 pm today. LMP March 1. Pt reports her pain is not affected by eating.

## 2025-03-28 NOTE — ED STATDOCS - PROGRESS NOTE DETAILS
signed Sharyn Rock PA-C Pt seen and examined initially in intake by Dr. Yo.   34F c/o lower abd pain and low grade temp today. No significant findings on labwork. CT with mild sigmoid diverticulitis, uncomplicated. DC on cipro/flagyl, outpt f/u with GI and PMD. return precautions given. Pt feeling well at DC, agrees with DC and plan of care.

## 2025-04-02 ENCOUNTER — APPOINTMENT (OUTPATIENT)
Dept: OBGYN | Facility: CLINIC | Age: 35
End: 2025-04-02

## 2025-04-21 ENCOUNTER — APPOINTMENT (OUTPATIENT)
Dept: GASTROENTEROLOGY | Facility: CLINIC | Age: 35
End: 2025-04-21
Payer: MEDICAID

## 2025-04-21 VITALS
BODY MASS INDEX: 18.69 KG/M2 | WEIGHT: 99 LBS | DIASTOLIC BLOOD PRESSURE: 80 MMHG | SYSTOLIC BLOOD PRESSURE: 122 MMHG | HEIGHT: 61 IN

## 2025-04-21 DIAGNOSIS — K21.9 GASTRO-ESOPHAGEAL REFLUX DISEASE W/OUT ESOPHAGITIS: ICD-10-CM

## 2025-04-21 DIAGNOSIS — K57.32 DIVERTICULITIS OF LARGE INTESTINE W/OUT PERFORATION OR ABSCESS W/OUT BLEEDING: ICD-10-CM

## 2025-04-21 DIAGNOSIS — Z87.19 PERSONAL HISTORY OF OTHER DISEASES OF THE DIGESTIVE SYSTEM: ICD-10-CM

## 2025-04-21 DIAGNOSIS — K57.92 DIVERTICULITIS OF INTESTINE, PART UNSPECIFIED, W/OUT PERFORATION OR ABSCESS W/OUT BLEEDING: ICD-10-CM

## 2025-04-21 DIAGNOSIS — R11.0 NAUSEA: ICD-10-CM

## 2025-04-21 DIAGNOSIS — R19.8 OTHER SPECIFIED SYMPTOMS AND SIGNS INVOLVING THE DIGESTIVE SYSTEM AND ABDOMEN: ICD-10-CM

## 2025-04-21 PROCEDURE — 99204 OFFICE O/P NEW MOD 45 MIN: CPT

## 2025-04-21 RX ORDER — PANTOPRAZOLE 40 MG/1
40 TABLET, DELAYED RELEASE ORAL DAILY
Qty: 1 | Refills: 3 | Status: ACTIVE | COMMUNITY
Start: 2025-04-21 | End: 1900-01-01

## 2025-04-21 RX ORDER — DESVENLAFAXINE SUCCINATE 25 MG/1
25 TABLET, EXTENDED RELEASE ORAL
Refills: 0 | Status: ACTIVE | COMMUNITY

## 2025-04-21 RX ORDER — SODIUM SULFATE, POTASSIUM SULFATE AND MAGNESIUM SULFATE 1.6; 3.13; 17.5 G/177ML; G/177ML; G/177ML
17.5-3.13-1.6 SOLUTION ORAL
Qty: 2 | Refills: 0 | Status: ACTIVE | COMMUNITY
Start: 2025-04-21 | End: 1900-01-01

## 2025-04-21 RX ORDER — ONDANSETRON 4 MG/1
4 TABLET ORAL
Qty: 90 | Refills: 0 | Status: ACTIVE | COMMUNITY
Start: 2025-04-21 | End: 1900-01-01

## 2025-04-28 ENCOUNTER — APPOINTMENT (OUTPATIENT)
Dept: OBGYN | Facility: CLINIC | Age: 35
End: 2025-04-28

## 2025-05-28 ENCOUNTER — RX RENEWAL (OUTPATIENT)
Age: 35
End: 2025-05-28

## 2025-06-30 ENCOUNTER — RX RENEWAL (OUTPATIENT)
Age: 35
End: 2025-06-30

## 2025-07-14 ENCOUNTER — NON-APPOINTMENT (OUTPATIENT)
Age: 35
End: 2025-07-14

## 2025-07-21 ENCOUNTER — APPOINTMENT (OUTPATIENT)
Dept: GASTROENTEROLOGY | Facility: AMBULATORY MEDICAL SERVICES | Age: 35
End: 2025-07-21

## 2025-08-08 ENCOUNTER — EMERGENCY (EMERGENCY)
Facility: HOSPITAL | Age: 35
LOS: 0 days | Discharge: HOME | End: 2025-08-09
Attending: EMERGENCY MEDICINE
Payer: MEDICAID

## 2025-08-08 VITALS
WEIGHT: 103.4 LBS | OXYGEN SATURATION: 100 % | TEMPERATURE: 98 F | SYSTOLIC BLOOD PRESSURE: 147 MMHG | DIASTOLIC BLOOD PRESSURE: 93 MMHG | RESPIRATION RATE: 19 BRPM | HEART RATE: 75 BPM

## 2025-08-08 DIAGNOSIS — Z98.82 BREAST IMPLANT STATUS: Chronic | ICD-10-CM

## 2025-08-08 PROCEDURE — 99283 EMERGENCY DEPT VISIT LOW MDM: CPT

## 2025-08-09 RX ORDER — CEPHALEXIN 250 MG/1
500 CAPSULE ORAL EVERY 12 HOURS
Refills: 0 | Status: DISCONTINUED | OUTPATIENT
Start: 2025-08-09 | End: 2025-08-09

## 2025-08-09 RX ORDER — CEPHALEXIN 250 MG/1
1 CAPSULE ORAL
Qty: 21 | Refills: 0
Start: 2025-08-09 | End: 2025-08-15

## 2025-08-09 RX ADMIN — CEPHALEXIN 500 MILLIGRAM(S): 250 CAPSULE ORAL at 00:35
